# Patient Record
Sex: MALE | Race: OTHER
[De-identification: names, ages, dates, MRNs, and addresses within clinical notes are randomized per-mention and may not be internally consistent; named-entity substitution may affect disease eponyms.]

---

## 2019-09-05 ENCOUNTER — HOSPITAL ENCOUNTER (EMERGENCY)
Dept: HOSPITAL 1 - ED | Age: 24
Discharge: LEFT BEFORE DISCHARGE | End: 2019-09-05
Payer: SELF-PAY

## 2019-09-05 VITALS — DIASTOLIC BLOOD PRESSURE: 72 MMHG | SYSTOLIC BLOOD PRESSURE: 127 MMHG

## 2019-09-05 VITALS — BODY MASS INDEX: 20.58 KG/M2 | WEIGHT: 169 LBS | HEIGHT: 76 IN

## 2019-09-05 VITALS — WEIGHT: 190 LBS | BODY MASS INDEX: 23.14 KG/M2 | HEIGHT: 76 IN

## 2019-09-05 VITALS — DIASTOLIC BLOOD PRESSURE: 70 MMHG | SYSTOLIC BLOOD PRESSURE: 139 MMHG
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
## 2019-09-05 DIAGNOSIS — Z53.21: Primary | ICD-10-CM

## 2019-09-05 DIAGNOSIS — S90.821A: ICD-10-CM
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
## 2019-09-05 DIAGNOSIS — S90.822A: Primary | ICD-10-CM

## 2019-10-10 ENCOUNTER — HOSPITAL ENCOUNTER (EMERGENCY)
Facility: MEDICAL CENTER | Age: 24
End: 2019-10-17
Attending: EMERGENCY MEDICINE
Payer: COMMERCIAL

## 2019-10-10 DIAGNOSIS — F41.9 ANXIETY: ICD-10-CM

## 2019-10-10 DIAGNOSIS — F22 PARANOIA (HCC): ICD-10-CM

## 2019-10-10 PROCEDURE — 99285 EMERGENCY DEPT VISIT HI MDM: CPT

## 2019-10-11 LAB
AMPHET UR QL SCN: NEGATIVE
BARBITURATES UR QL SCN: NEGATIVE
BENZODIAZ UR QL SCN: POSITIVE
BZE UR QL SCN: NEGATIVE
CANNABINOIDS UR QL SCN: NEGATIVE
METHADONE UR QL SCN: NEGATIVE
OPIATES UR QL SCN: NEGATIVE
OXYCODONE UR QL SCN: NEGATIVE
PCP UR QL SCN: NEGATIVE
POC BREATHALIZER: 0.01 PERCENT (ref 0–0.01)
PROPOXYPH UR QL SCN: NEGATIVE

## 2019-10-11 PROCEDURE — 700102 HCHG RX REV CODE 250 W/ 637 OVERRIDE(OP): Performed by: STUDENT IN AN ORGANIZED HEALTH CARE EDUCATION/TRAINING PROGRAM

## 2019-10-11 PROCEDURE — A9270 NON-COVERED ITEM OR SERVICE: HCPCS | Performed by: PSYCHIATRY & NEUROLOGY

## 2019-10-11 PROCEDURE — 80307 DRUG TEST PRSMV CHEM ANLYZR: CPT

## 2019-10-11 PROCEDURE — 700102 HCHG RX REV CODE 250 W/ 637 OVERRIDE(OP): Performed by: PSYCHIATRY & NEUROLOGY

## 2019-10-11 PROCEDURE — 99284 EMERGENCY DEPT VISIT MOD MDM: CPT | Performed by: PSYCHIATRY & NEUROLOGY

## 2019-10-11 PROCEDURE — 90791 PSYCH DIAGNOSTIC EVALUATION: CPT

## 2019-10-11 PROCEDURE — A9270 NON-COVERED ITEM OR SERVICE: HCPCS | Performed by: STUDENT IN AN ORGANIZED HEALTH CARE EDUCATION/TRAINING PROGRAM

## 2019-10-11 PROCEDURE — 302970 POC BREATHALIZER: Performed by: EMERGENCY MEDICINE

## 2019-10-11 RX ORDER — HALOPERIDOL 5 MG/1
5 TABLET ORAL
Status: DISCONTINUED | OUTPATIENT
Start: 2019-10-11 | End: 2019-10-17 | Stop reason: HOSPADM

## 2019-10-11 RX ORDER — HALOPERIDOL 5 MG/ML
5 INJECTION INTRAMUSCULAR
Status: DISCONTINUED | OUTPATIENT
Start: 2019-10-11 | End: 2019-10-17 | Stop reason: HOSPADM

## 2019-10-11 RX ORDER — CLONAZEPAM 0.5 MG/1
0.5 TABLET ORAL 2 TIMES DAILY
Status: DISCONTINUED | OUTPATIENT
Start: 2019-10-11 | End: 2019-10-17 | Stop reason: HOSPADM

## 2019-10-11 RX ORDER — DIPHENHYDRAMINE HCL 25 MG
50 TABLET ORAL
Status: DISCONTINUED | OUTPATIENT
Start: 2019-10-11 | End: 2019-10-17 | Stop reason: HOSPADM

## 2019-10-11 RX ORDER — LORAZEPAM 2 MG/1
2 TABLET ORAL
Status: DISCONTINUED | OUTPATIENT
Start: 2019-10-11 | End: 2019-10-17 | Stop reason: HOSPADM

## 2019-10-11 RX ORDER — QUETIAPINE FUMARATE 100 MG/1
100 TABLET, FILM COATED ORAL 2 TIMES DAILY
Status: DISCONTINUED | OUTPATIENT
Start: 2019-10-11 | End: 2019-10-12

## 2019-10-11 RX ORDER — QUETIAPINE FUMARATE 25 MG/1
25 TABLET, FILM COATED ORAL 2 TIMES DAILY
COMMUNITY
End: 2019-10-11

## 2019-10-11 RX ORDER — DIPHENHYDRAMINE HYDROCHLORIDE 50 MG/ML
50 INJECTION INTRAMUSCULAR; INTRAVENOUS
Status: DISCONTINUED | OUTPATIENT
Start: 2019-10-11 | End: 2019-10-17 | Stop reason: HOSPADM

## 2019-10-11 RX ORDER — LORAZEPAM 2 MG/ML
2 INJECTION INTRAMUSCULAR
Status: DISCONTINUED | OUTPATIENT
Start: 2019-10-11 | End: 2019-10-17 | Stop reason: HOSPADM

## 2019-10-11 RX ORDER — RISPERIDONE 1 MG/1
1 TABLET ORAL 2 TIMES DAILY
Status: DISCONTINUED | OUTPATIENT
Start: 2019-10-11 | End: 2019-10-11

## 2019-10-11 RX ADMIN — QUETIAPINE FUMARATE 100 MG: 100 TABLET ORAL at 13:16

## 2019-10-11 RX ADMIN — CLONAZEPAM 0.5 MG: 0.5 TABLET ORAL at 18:24

## 2019-10-11 RX ADMIN — DIPHENHYDRAMINE HCL 50 MG: 25 TABLET ORAL at 22:55

## 2019-10-11 RX ADMIN — HALOPERIDOL 5 MG: 5 TABLET ORAL at 22:50

## 2019-10-11 RX ADMIN — RISPERIDONE 1 MG: 1 TABLET ORAL at 12:17

## 2019-10-11 RX ADMIN — CLONAZEPAM 0.5 MG: 0.5 TABLET ORAL at 12:21

## 2019-10-11 RX ADMIN — LORAZEPAM 2 MG: 2 TABLET ORAL at 22:13

## 2019-10-11 RX ADMIN — CLONAZEPAM 0.5 MG: 0.5 TABLET ORAL at 12:17

## 2019-10-11 ASSESSMENT — ENCOUNTER SYMPTOMS
EYES NEGATIVE: 1
CARDIOVASCULAR NEGATIVE: 1
CONSTITUTIONAL NEGATIVE: 1
HALLUCINATIONS: 1
SHORTNESS OF BREATH: 1
NEUROLOGICAL NEGATIVE: 1
GASTROINTESTINAL NEGATIVE: 1
MUSCULOSKELETAL NEGATIVE: 1

## 2019-10-11 NOTE — DISCHARGE PLANNING
Referral: Legal Hold     Intervention: Legal Hold Paperwork given to TRENTON Mcdaniel by Alert Team CARMELINA Maki.     Legal Hold Initiated: Date: 10- Time: 0600     Patient’s Insurance Listed on Face Sheet: [employer]     Referrals sent to: [facility] and [facility]     This referral contains the following information:  1. Face sheet _x___  2. Page 1 and Page 2 of Legal Hold _x___  3. Alert Team Assessment/Psych Assessment _x__  4. Head to toe physical exam ___x_  5. Urine Drug Screen _x___  6. Blood Alcohol _x___  7. Vital signs __x__  8. Pregnancy test when applicable _NA__  9. Medications list __x__     Plan: Patient will transfer to mental health facility once acceptance is obtained
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
## 2019-10-11 NOTE — ED PROVIDER NOTES
ED Provider Note    The patient was signed out to my care for repeat exam as the patient is awaiting transfer for anxiety and psychosis.  The patient has been stable and has not required any acute interventions.  He has been reexamined by life skills since they state that is pending transfer with acceptance to his facility for his psychosis.

## 2019-10-11 NOTE — ED TRIAGE NOTES
Pt seen at [facility] last night for anxiety and states he didn't get the medication he requested so he decided to try here.

## 2019-10-11 NOTE — ED NOTES
Care assumed, patient  Up to bathroom to provide urine sample. Nad noted. Remains in line of sight of sitter.

## 2019-10-11 NOTE — ED NOTES
Patient's home medications have been reviewed by the pharmacy team.     No past medical history on file.    Patient's Medications   New Prescriptions    No medications on file   Previous Medications    No medications on file   Modified Medications    No medications on file   Discontinued Medications    QUETIAPINE (SEROQUEL) 25 MG TAB    Take 25 mg by mouth 2 times a day.          A:  Denies taking any home medications.     P:    No recommendations at this time. Defer to psychiatry.     Barbie Guajardo, Pharm.D., BCPS

## 2019-10-11 NOTE — ED PROVIDER NOTES
"ER Provider Note     Scribed for Vik Rasheed M.D. by Daniel Galarza. 10/11/2019, 12:01 AM.    Primary Care Provider: No primary care provider noted.  Means of Arrival: EMS   History obtained from: Patient  History limited by: None     CHIEF COMPLAINT  Chief Complaint   Patient presents with   • Anxiety     Pt seen at [facility] last night for anxiety also. Requesting  RX       HPI  Donald Masters is a 24 y.o. male who presents to the Emergency Department for acute, intermittent shortness of breath and anxiety onset a couple week ago. Patient states that he was seen at [facility] earlier tonight for shortness of breath and given \"benzos\". He left [facility] to \"get his stuff from his step-mom and started to freak out\". Patient called the police who referred him to EMS. He notes that EMS, not the patient, decided to bring him here rather than [facility]. He states that \"his memory has not been working\" for the past couple weeks, but mostly complains that \"he cannot talk and breath at the same time. Denies any associated chest pain, heart palpations, or abdominal pain. There are no known alleviating or exacerbating factors. He admits to having a history of panic disorder, and alleges that he has been on Seroquel     Patient states that he trained as a  and can \"run back to [place]\", which he states his psychiatrist is located. He additionally notes that his symptoms are due to \"black magic\". When asked for a family member we could call, he gave a number for his step mom, \"Estefany\": [phone].    REVIEW OF SYSTEMS  See HPI for further details. All other systems are negative.     PAST MEDICAL HISTORY       SURGICAL HISTORY  patient denies any surgical history    SOCIAL HISTORY  Social History     Tobacco Use   • Smoking status: Not on file   Substance Use Topics   • Alcohol use: Not on file   • Drug use: Not on file      Social History     Substance and Sexual Activity   Drug Use Not on file       FAMILY " HISTORY  No family history on file.    CURRENT MEDICATIONS  Current Outpatient Medications:   •  QUEtiapine (SEROQUEL) 25 MG Tab, Take 25 mg by mouth 2 times a day., Disp: , Rfl:     ALLERGIES  No Known Allergies    PHYSICAL EXAM  VITAL SIGNS: /77   Pulse 75   Temp 36.4 °C (97.5 °F) (Temporal)   Resp 16   SpO2 99%      Constitutional: Alert in no apparent distress.  HENT: No signs of trauma, Bilateral external ears normal, Nose normal.   Eyes: Pupils are equal and reactive, Conjunctiva normal, Non-icteric.   Neck: Normal range of motion, No tenderness, Supple, No stridor.   Lymphatic: No lymphadenopathy noted.   Cardiovascular: Regular rate and rhythm, no murmurs.   Thorax & Lungs: Normal breath sounds, No respiratory distress, No wheezing, No chest tenderness.   Abdomen: Bowel sounds normal, Soft, No tenderness, No masses, No pulsatile masses. No peritoneal signs.  Skin: Warm, Dry, No erythema, No rash.   Back: No bony tenderness, No CVA tenderness.   Extremities: Intact distal pulses, No edema, No tenderness, No cyanosis.  Musculoskeletal: Good range of motion in all major joints. No tenderness to palpation or major deformities noted.   Neurologic: Alert , Normal motor function, Normal sensory function, No focal deficits noted.   Psychiatric: Affect normal, Judgment normal, Mood normal.     DIAGNOSTIC STUDIES / PROCEDURES      LABS  Labs Reviewed   URINE DRUG SCREEN   POC BREATHALIZER     All labs reviewed by me.    RADIOLOGY  No orders to display      The radiologist's interpretation of all radiological studies have been reviewed by me.    COURSE & MEDICAL DECISION MAKING  Pertinent Labs & Imaging studies reviewed. (See chart for details)    This is a 24 y.o. male that presents with severe anxiety.  At this time the patient stomach criteria for hold however wanted to be evaluated by our behavioral health team.  I will get an alcohol level as well as urine drug screen and then have the alert team see the  patient.    12:01 AM - Patient seen and examined at bedside.     Patient will be signed out to Dr. Perrin with disposition pending.  I believe that the patient gets resources from the behavioral health team that he would likely be a safe discharge.    FINAL IMPRESSION  Anxiety.  Paranoia.     IDaniel (Scribe), am scribing for, and in the presence of, Vik Rasheed M.D..    Electronically signed by: Danile Galarza (Rhondaibe), 10/11/2019    IVik M.D. personally performed the services described in this documentation, as scribed by Daniel Galarza in my presence, and it is both accurate and complete.     E.    The note accurately reflects work and decisions made by me.  Vik Rasheed  10/11/2019  2:05 AM

## 2019-10-11 NOTE — ED NOTES
Med Rec completed per patient   Allergies reviewed  No ORAL antibiotics in last 14 days    Patient states that he is not currently taking any medications at home

## 2019-10-11 NOTE — ED NOTES
"Pt lying in bed. When asked if he was ok Pt stated \" Yes, I am just rehearsing.\" No c/o pain or distress observed.  "
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
## 2019-10-11 NOTE — ED PROVIDER NOTES
ED Provider Note    Patient signed out from Dr. Rasheed pending life skills evaluation.  Please see his note for the initial evaluation and management.  Patient was evaluated by life skills and placed on legal hold due to concerns for patient's safety due to continuing anxiety and paranoia.  Patient will be signed out to the oncoming ED physician while awaiting further mental health evaluation and possible psychiatric placement.

## 2019-10-12 PROCEDURE — A9270 NON-COVERED ITEM OR SERVICE: HCPCS | Performed by: PSYCHIATRY & NEUROLOGY

## 2019-10-12 PROCEDURE — A9270 NON-COVERED ITEM OR SERVICE: HCPCS | Performed by: STUDENT IN AN ORGANIZED HEALTH CARE EDUCATION/TRAINING PROGRAM

## 2019-10-12 PROCEDURE — A9270 NON-COVERED ITEM OR SERVICE: HCPCS | Performed by: EMERGENCY MEDICINE

## 2019-10-12 PROCEDURE — 700102 HCHG RX REV CODE 250 W/ 637 OVERRIDE(OP): Performed by: STUDENT IN AN ORGANIZED HEALTH CARE EDUCATION/TRAINING PROGRAM

## 2019-10-12 PROCEDURE — 700102 HCHG RX REV CODE 250 W/ 637 OVERRIDE(OP): Performed by: PSYCHIATRY & NEUROLOGY

## 2019-10-12 PROCEDURE — 700102 HCHG RX REV CODE 250 W/ 637 OVERRIDE(OP): Performed by: EMERGENCY MEDICINE

## 2019-10-12 PROCEDURE — 99281 EMR DPT VST MAYX REQ PHY/QHP: CPT | Performed by: PSYCHIATRY & NEUROLOGY

## 2019-10-12 RX ORDER — QUETIAPINE FUMARATE 25 MG/1
50 TABLET, FILM COATED ORAL 2 TIMES DAILY
Status: DISCONTINUED | OUTPATIENT
Start: 2019-10-12 | End: 2019-10-17 | Stop reason: HOSPADM

## 2019-10-12 RX ORDER — QUETIAPINE FUMARATE 100 MG/1
100 TABLET, FILM COATED ORAL EVERY EVENING
Status: DISCONTINUED | OUTPATIENT
Start: 2019-10-12 | End: 2019-10-13

## 2019-10-12 RX ORDER — NICOTINE 21 MG/24HR
1 PATCH, TRANSDERMAL 24 HOURS TRANSDERMAL ONCE
Status: COMPLETED | OUTPATIENT
Start: 2019-10-12 | End: 2019-10-13

## 2019-10-12 RX ADMIN — QUETIAPINE FUMARATE 100 MG: 100 TABLET ORAL at 20:19

## 2019-10-12 RX ADMIN — QUETIAPINE FUMARATE 50 MG: 25 TABLET ORAL at 14:35

## 2019-10-12 RX ADMIN — NICOTINE TRANSDERMAL SYSTEM 1 PATCH: 21 PATCH, EXTENDED RELEASE TRANSDERMAL at 20:19

## 2019-10-12 RX ADMIN — CLONAZEPAM 0.5 MG: 0.5 TABLET ORAL at 06:27

## 2019-10-12 RX ADMIN — CLONAZEPAM 0.5 MG: 0.5 TABLET ORAL at 19:07

## 2019-10-12 RX ADMIN — QUETIAPINE FUMARATE 100 MG: 100 TABLET ORAL at 06:27

## 2019-10-12 NOTE — PSYCHIATRY
"PSYCHIATRIC FOLLOW-UP:(established)  *Reason for admission:  seen at [facility] last night for anxiety also: acute, intermittent shortness of breath and anxiety onset a couple week ago. Complains that \"he cannot talk and breath at the same time                      *Legal Hold Status on Admission: +                     *HPI:  Sleeping more because of the \"black magic\". Feels \"uncomfortable because he is in hospital gown. Not SI. Today says he is supposed to get  soon but only if he can get himself together. Asked him about halluciations and told him I knew he had them yesterday. He thought about this and said, \"but not today\".  MSE nearly the same.         *Psychiatric Examination:  Vitals: Blood pressure 102/54, pulse 65, temperature 36.5 °C (97.7 °F), temperature source Oral, resp. rate 16, height 1.93 m (6' 4\"), weight 81.6 kg (180 lb), SpO2 94 %.  General Appearance: intermittent to good eye contact.   Abnormal Movements: none  Gait and Posture: lying down  Speech:wnl  Thought Process: normal rate   Associations:linear  Abnormal or Psychotic Thoughts:delusions, hallucinations  Judgement and Insight:impaired  Orientation:grossly   Recent and Remote Memory: grossly intact  Attention Span and Concentration:intact  Language:fluid  Fund of Knowledge:not tested  Mood and Affect: not identified but looks euthymic  SI/HI: denies     *ASSESSMENT/PLAN:  1. Psychotic disorder unspc  -R/O schizophrenia: very likely  -lower daytime seroquel to 50 mg bid     2. Medical:  -Hx of TBI per mom with residual memory impairment at age 19.            *Legal hold: extended                *Will Follow      "

## 2019-10-12 NOTE — ED NOTES
Pt up ambulatory with steady gait to the bathroom. Pt ambulated back to room, requesting ritalin and breakfast. Denies SI/HI. Monitoring continues.

## 2019-10-12 NOTE — DISCHARGE PLANNING
Alert Team    Patient lying in bed sleeping (Patient appears to be breathing normally with no distress, with sitter in clear view of patient) at time of rounding; he is currently on a legal hold at this time while awaiting transfer to an inpatient psychiatric facility. The patient was reported as struggling with anxiety alongside responding to internal stimuli; he was noted by psychiatry as having delusions/paranoid thoughts. Alert Team to continue to monitor; nothing further to note at this time.

## 2019-10-12 NOTE — ED NOTES
"Pts family at bedside. Ok per pt to give information regarding medical information. Pt in family consultation room. Per mother of pt, family concerned for pt safety as \"it is the worst we have ever seen him.\" Mother of pt reports 6-7 ER visits in September stating \"he is in danger and not in control.\" Per sister pt has dx of schizophrenia and bipolar. Family does not feel he is safe to discharge and needs inpatient treatment. Family brought an advance health care directive form from the [facility]. Copies obtained and put in pt chart. Pt had many questions about Legal 2000 hold. Social work contacted and to discuss with family.     Contact #  Mother Italia [phone]  Sister Zack [phone]  "

## 2019-10-12 NOTE — ED NOTES
"Pt increasingly paranoid, states he wants to leave \"I'm being held here illegally\". Pt redirected, emotional support provided. Pt agrees to take PO medications.  Snack given.   "

## 2019-10-12 NOTE — ED NOTES
Assumed care of pt at this time, pt appears to be sleeping. Respirations even and non-labored. NAD at this time. 1:1 sitter at bedside and in view of pt.

## 2019-10-12 NOTE — DISCHARGE PLANNING
MSW spoke to Violeta at [facility]. They tried to contract with pt's [facility] through [place], [place]. They declined and would not work with them. Pt now awaiting bed at [facility].

## 2019-10-12 NOTE — ED PROVIDER NOTES
ED Provider Note Addendum     This is an addendum to the note on Donald Aaronessie.  For further details and full chart information, see patient's initial note.          8:27 AM  - Patient evaluated by myself. Patient is calm and cooperative with no complaints. Patient is awaiting transfer to psychiatric facility for further psych evaluation.    Disposition:  Patient will be transferred to an appropriate psychiatric facility in stable condition for further psychiatric care and evaluation.  Patient will be placed under the care of my partner awaiting transfer.    FINAL IMPRESSION  1. Anxiety Active   2. Paranoia (HCC) Active           The note accurately reflects work and decisions made by me.  Jamin Catherine  10/12/2019  12:30 PM

## 2019-10-12 NOTE — ED NOTES
"Pt awake and pacing in room, out to nurses station, states \"I'm going to leave soon\". Discussed limitations d/t legal hold. Pt voices understanding, still appears anxious. Pt agrees to take Ativan PO per PRN orders.   "

## 2019-10-12 NOTE — ED NOTES
"Pt out of room, states \"I want to leave\". Pt advised and aware of legal hold. Pt redirected, meds given, dinner given. Pt pacing in room post meds,   "

## 2019-10-12 NOTE — ED NOTES
"Pt states \"there are free agents everywhere, they are with the FBI\". Pt paranoid. Redirected.   "

## 2019-10-13 PROCEDURE — 700111 HCHG RX REV CODE 636 W/ 250 OVERRIDE (IP): Performed by: PSYCHIATRY & NEUROLOGY

## 2019-10-13 PROCEDURE — 99281 EMR DPT VST MAYX REQ PHY/QHP: CPT | Performed by: PSYCHIATRY & NEUROLOGY

## 2019-10-13 PROCEDURE — 700102 HCHG RX REV CODE 250 W/ 637 OVERRIDE(OP): Performed by: PSYCHIATRY & NEUROLOGY

## 2019-10-13 PROCEDURE — 96372 THER/PROPH/DIAG INJ SC/IM: CPT

## 2019-10-13 PROCEDURE — A9270 NON-COVERED ITEM OR SERVICE: HCPCS | Performed by: PSYCHIATRY & NEUROLOGY

## 2019-10-13 RX ORDER — QUETIAPINE FUMARATE 100 MG/1
200 TABLET, FILM COATED ORAL EVERY EVENING
Status: DISCONTINUED | OUTPATIENT
Start: 2019-10-13 | End: 2019-10-14

## 2019-10-13 RX ADMIN — DIPHENHYDRAMINE HYDROCHLORIDE 50 MG: 50 INJECTION INTRAMUSCULAR; INTRAVENOUS at 02:32

## 2019-10-13 RX ADMIN — QUETIAPINE FUMARATE 50 MG: 25 TABLET ORAL at 15:21

## 2019-10-13 RX ADMIN — LORAZEPAM 2 MG: 2 INJECTION INTRAMUSCULAR; INTRAVENOUS at 02:32

## 2019-10-13 RX ADMIN — LORAZEPAM 2 MG: 2 TABLET ORAL at 15:48

## 2019-10-13 RX ADMIN — HALOPERIDOL LACTATE 5 MG: 5 INJECTION, SOLUTION INTRAMUSCULAR at 02:32

## 2019-10-13 RX ADMIN — QUETIAPINE FUMARATE 200 MG: 100 TABLET ORAL at 20:46

## 2019-10-13 RX ADMIN — DIPHENHYDRAMINE HCL 50 MG: 25 TABLET ORAL at 16:49

## 2019-10-13 RX ADMIN — HALOPERIDOL 5 MG: 5 TABLET ORAL at 16:49

## 2019-10-13 ASSESSMENT — PAIN SCALES - WONG BAKER: WONGBAKER_NUMERICALRESPONSE: DOESN'T HURT AT ALL

## 2019-10-13 NOTE — ED NOTES
Assumed care of pt. Pt AAOx4. No signs of distress. Side rails up. Pt. Ambulated to bathroom with steady gait.

## 2019-10-13 NOTE — ED NOTES
Report received from Alexandr CLEANING, pt care assumed, pt resting with rise and fall of chest observed.

## 2019-10-13 NOTE — PSYCHIATRY
"PSYCHIATRIC FOLLOW-UP:(established)  *Reason for admission:   seen at [facility] last night for anxiety also: acute, intermittent shortness of breath and anxiety onset a couple week ago. Complains that \"he cannot talk and breath at the same time                           *Legal Hold Status on Admission:+                        *HPI:  \"that donald (the sitter) is looking at my balls so I freaked out\" and made a mess of the room.  Black magic continues to be an issue.         *Psychiatric Examination:  Vitals: Blood pressure 113/62, pulse 96, temperature 36.4 °C (97.5 °F), temperature source Tympanic, resp. rate 14, height 1.93 m (6' 4\"), weight 81.6 kg (180 lb), SpO2 100 %.  General Appearance: intermittent to good eye contact.   Abnormal Movements: none  Gait and Posture: pacing  Speech:wnl  Thought Process: normal rate   Associations:linear  Abnormal or Psychotic Thoughts:delusions, hallucinations  Judgement and Insight:impaired  Orientation:grossly   Recent and Remote Memory: grossly intact  Attention Span and Concentration:intact  Language:fluid  Fund of Knowledge: looks irritated but not threatening.  SI/HI: denies      *ASSESSMENT/PLAN:  1. Psychotic disorder unspc  -R/O schizophrenia: very likely  -continue seroquel 50/50 and increase hs to 100 mg     2. Medical:  -Hx of TBI per mom with residual memory impairment at age 19.               *Legal hold: extended              *Will Follow      "

## 2019-10-13 NOTE — DISCHARGE PLANNING
MSW received call from bedside RN. Pt's family had lots of questions regarding legal hold process. MSW explained process and what to expect as family. MSW educated pt's family on POA vs guardian/conservator. Family thankful for conversation. MSW will meet with family again in am to provide resources.

## 2019-10-13 NOTE — ED NOTES
Vital signs rechecked, patient is resting in the gurney and is easy to awaken at this time.   All family members left

## 2019-10-13 NOTE — ED NOTES
Pt resting comfortably. Diet order changed to vegetarian per family request. Pt requesting toothbrush toothpaste. Items provided to pt.

## 2019-10-13 NOTE — ED NOTES
Pt attempting to leave ED, threatening staff. Refused PRN haldol given IM PRNs with security at bedside.

## 2019-10-13 NOTE — ED NOTES
Assessment unchanged. Pt resting in bed comfortably. No signs of distress. VSS. Side rails up.

## 2019-10-13 NOTE — ED NOTES
Pt still agitated he did have some decrease with medication however continues to pace and use bathroom frequently and obsessively washing hands. Pt medicated and aware of staying calm cooperative.

## 2019-10-13 NOTE — ED NOTES
Pt has had family at bedside per  approval, however after family visit pt is becoming more agitated and pacing room, he is escalating verbally, pt informed of remaining calm and cooperative he has been medicated for agitation.

## 2019-10-13 NOTE — ED NOTES
Assessment unchanged. Pt resting in bed comfortably. No signs of distress. Side rails up. Sitter monitoring pt.

## 2019-10-13 NOTE — ED PROVIDER NOTES
ED PROVIDER NOTE    Scribed for Jamin Catherine D.O. by Sharon Lama. 10/13/2019, 9:25 AM.    This is an addendum to the note on Donald Masters. For further details and full chart entry, see the previously signed ED Provider Note written by previous ERP.      7:30 AM - I discussed the patient's case with previous ERP who will transfer care of the patient to me at this time.        9:16 AM - The patient's continuing management included reevaluation. The patient is sleeping comfortably however is easily arousable and calm. She remains on Legal hold and is awiting psychiatric evaluation.     FINAL IMPRESSION      I, Sharon Lama (Scribe), am scribing for, and in the presence of, Jamin Catherine D.O..    Electronically signed by: Sharon Lama (Scribe), 10/13/2019    IJamin D.O. personally performed the services described in this documentation, as scribed by Sharon Lama in my presence, and it is both accurate and complete.    The note accurately reflects work and decisions made by me.  Jamin Catherine  10/13/2019  1:03 PM

## 2019-10-13 NOTE — ED NOTES
Assumed care of pt. Pt. Pacing around room. Follows commands.. Pt AAOx4. VSS. No signs of distress. Side rails up.

## 2019-10-14 PROCEDURE — A9270 NON-COVERED ITEM OR SERVICE: HCPCS | Performed by: EMERGENCY MEDICINE

## 2019-10-14 PROCEDURE — 700102 HCHG RX REV CODE 250 W/ 637 OVERRIDE(OP): Performed by: PSYCHIATRY & NEUROLOGY

## 2019-10-14 PROCEDURE — A9270 NON-COVERED ITEM OR SERVICE: HCPCS | Performed by: PSYCHIATRY & NEUROLOGY

## 2019-10-14 PROCEDURE — 99281 EMR DPT VST MAYX REQ PHY/QHP: CPT | Performed by: PSYCHIATRY & NEUROLOGY

## 2019-10-14 PROCEDURE — 700102 HCHG RX REV CODE 250 W/ 637 OVERRIDE(OP): Performed by: EMERGENCY MEDICINE

## 2019-10-14 RX ORDER — QUETIAPINE FUMARATE 100 MG/1
300 TABLET, FILM COATED ORAL EVERY EVENING
Status: DISCONTINUED | OUTPATIENT
Start: 2019-10-14 | End: 2019-10-17 | Stop reason: HOSPADM

## 2019-10-14 RX ORDER — NICOTINE 21 MG/24HR
1 PATCH, TRANSDERMAL 24 HOURS TRANSDERMAL ONCE
Status: COMPLETED | OUTPATIENT
Start: 2019-10-14 | End: 2019-10-15

## 2019-10-14 RX ADMIN — LORAZEPAM 2 MG: 2 TABLET ORAL at 11:56

## 2019-10-14 RX ADMIN — QUETIAPINE FUMARATE 300 MG: 100 TABLET ORAL at 20:03

## 2019-10-14 RX ADMIN — CLONAZEPAM 0.5 MG: 0.5 TABLET ORAL at 06:19

## 2019-10-14 RX ADMIN — QUETIAPINE FUMARATE 50 MG: 25 TABLET ORAL at 14:53

## 2019-10-14 RX ADMIN — NICOTINE TRANSDERMAL SYSTEM 1 PATCH: 21 PATCH, EXTENDED RELEASE TRANSDERMAL at 13:09

## 2019-10-14 RX ADMIN — QUETIAPINE FUMARATE 50 MG: 25 TABLET ORAL at 06:20

## 2019-10-14 RX ADMIN — CLONAZEPAM 0.5 MG: 0.5 TABLET ORAL at 17:24

## 2019-10-14 NOTE — ED NOTES
Sitter removed from doorway, RN continued close observations. Pt resting calmly in bed, no s/s of distress noted.

## 2019-10-14 NOTE — ED NOTES
Pt sleeping comfortably in bed, breathing is easy and unlabored. Bed in lowest position, blankets provided for comfort. No s/s of distress noted. No new needs identified,Will continue to monitor.

## 2019-10-14 NOTE — ED NOTES
Pt sleeping comfortably in bed, breathing is easy and unlabored. Bed in lowest position, blankets provided for comfort. No s/s of distress noted. No new needs identified, Will continue to monitor.

## 2019-10-14 NOTE — ED NOTES
Pt pacing in room. States he is extremely agitated and anxious. Vital signs obtained. HR elevated. Attempted to redirect pt to calm self. Pt continues to pace and state he is extremely anxious.     Medicated per MAR

## 2019-10-14 NOTE — ED NOTES
Sitter 1:1 at bedside, safety maintained, constant obs on patient continued. Laying down in bed. Continuing to monitor.
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
## 2019-10-14 NOTE — ED NOTES
Pt requesting to be discharged. Updated on wait for MD evaluation. Pt agitated with plan. Cooperative at this time.

## 2019-10-14 NOTE — ED NOTES
Report received from HERMILA Carrero    Pt sleeping soundly at this time. Will continue to monitor.

## 2019-10-14 NOTE — ED NOTES
Pt sleeping comfortably in bed, breathing is easy and unlabored. Bed in lowest position, blankets provided for comfort. No s/s of distress noted. No new needs identified. Will continue to monitor.

## 2019-10-14 NOTE — ED NOTES
Pt medicated with morning medications, asking RN if he can leave and go home.  Education provided, RN informed pt that  Will be rounding this morning to discussed POC with pt.  Pt verbalized understanding, calm and cooperative.

## 2019-10-14 NOTE — ED PROVIDER NOTES
"ED Provider Note    I have checked on the patient.  No issues are reported by nursing staff.  When asked him how he is doing he looks me in the pulls the covers over his head./70   Pulse 92   Temp 36.9 °C (98.5 °F) (Temporal)   Resp 14   Ht 1.93 m (6' 4\")   Wt 81.6 kg (180 lb)   SpO2 98%   BMI 21.91 kg/m²   We await psychiatric transfer.  "

## 2019-10-14 NOTE — ED NOTES
Assumed care, report received from RN, POC discussed.   Introduced self as RN, call light within reach, no s/s of distress noted.   Q15 observation in place.

## 2019-10-14 NOTE — DISCHARGE PLANNING
Alert Team  Noted pt to still be pacing in his room and smiling, floridly responding to internal stimuli.  PO PRN Ativan was given about an hour ago.  WCTM.

## 2019-10-14 NOTE — ED NOTES
Continued sitter at doorway.  No s/s of distress noted. Pt eating snack of crackers and juice in bed.

## 2019-10-14 NOTE — ED NOTES
Pt redirected into room. Pt repeatedly asking if he can go for a walk. Instructed that once a  is able- they will walk with him when available.

## 2019-10-14 NOTE — DISCHARGE PLANNING
"Alert Team  Pt continues to try and calm himself; trying to walk, stretch, read, watch tv.  It is short lived, and he frequently paces and approached the nurses station asking if he can be discharged, what the plan for his care is, and with unusual physical complaints.  At one point, asked for the medical doctor to \"check my liver, I think it's bleeding.\"  Minimal insight.  While easily redirected, he is notably anxious despite 2mg of Ativan given around noon.  "

## 2019-10-14 NOTE — DISCHARGE PLANNING
"ALERT Team Rounds Note:     Data: Patient has been in the ER 65 hours. Patient is on a legal hold prompted by his anxiety and psychosis involving grandiose and paranoid delusions and hallucinations, and is awaiting transfer to a psychiatric hospital.     Patient was observed seated on his bed, reading, in no apparent distress, but he spoke of dealing with violent and evil people through his reading, and of wanting to avoid them. He also said that he is much improved since his arrival, \"360 degrees.\"    MENTAL STATUS   Participation: Active verbal participation  Grooming: Adequate  Orientation: not assessed.  Behavior: Calm  Eye contact: Limited  Mood: Euthymic, by appearance.  Affect: Blunted  Thought process: Perseveration, mildly disorganized.  Thought content: Preoccupation and Paranoia  Speech: Rate within normal limits, Volume within normal limits and mildy disorganized.  Perception: Not assessed.  Memory:  No gross evidence of memory deficits  Insight: Poor  Judgment:  Poor    I have reviewed the note in the patient's chart for this admission.    Assessment: Pt's capacity for self-care continues to appear undermined by his mental illness.    Plan: Per chart.    Freddie Iverson, PhD, Alert Team Psychologist   "

## 2019-10-15 PROCEDURE — 96372 THER/PROPH/DIAG INJ SC/IM: CPT

## 2019-10-15 PROCEDURE — 99283 EMERGENCY DEPT VISIT LOW MDM: CPT | Performed by: PSYCHIATRY & NEUROLOGY

## 2019-10-15 PROCEDURE — 700102 HCHG RX REV CODE 250 W/ 637 OVERRIDE(OP): Performed by: PSYCHIATRY & NEUROLOGY

## 2019-10-15 PROCEDURE — A9270 NON-COVERED ITEM OR SERVICE: HCPCS | Performed by: PSYCHIATRY & NEUROLOGY

## 2019-10-15 PROCEDURE — 700111 HCHG RX REV CODE 636 W/ 250 OVERRIDE (IP): Performed by: PSYCHIATRY & NEUROLOGY

## 2019-10-15 RX ADMIN — LORAZEPAM 2 MG: 2 INJECTION INTRAMUSCULAR; INTRAVENOUS at 12:24

## 2019-10-15 RX ADMIN — HALOPERIDOL LACTATE 5 MG: 5 INJECTION, SOLUTION INTRAMUSCULAR at 14:21

## 2019-10-15 RX ADMIN — CLONAZEPAM 0.5 MG: 0.5 TABLET ORAL at 18:48

## 2019-10-15 RX ADMIN — QUETIAPINE FUMARATE 300 MG: 100 TABLET ORAL at 18:49

## 2019-10-15 NOTE — ED PROVIDER NOTES
ED Provider Note    6:16 AM Patient reevaluated.  Patient is on a legal hold, waiting transfer to psychiatric facility when a bed is available.  Please refer to initial note for complete details.  Patient was somewhat anxious overnight but redirectable without additional medication intervention.  Otherwise no concerns.  Patient ambulates to the bathroom independently and tolerated a meal.  Medication reconciliation has been reviewed.

## 2019-10-15 NOTE — ED NOTES
"Pt visibly agitated but states he is trying to \"control himself\". Reading and pacing the room. Still refusing to take medication PRN or oral. Sitter in direct obs of pt  "

## 2019-10-15 NOTE — ED NOTES
Assessment unchanged. Pt resting in bed comfortably. No signs of distress. VSS. Side rails up. Call light within reach.

## 2019-10-15 NOTE — ED NOTES
Pt and I walked around the unit for about 15 mins. And changed the sheets on his bed. Pt is cooperative and resting in his bed. Sitter in close obs

## 2019-10-15 NOTE — ED NOTES
Legal at bedside. Pt alert and pacing in room. Cooperative. Refused to brush teeth. Sitter in direct obs of pt

## 2019-10-15 NOTE — ED PROVIDER NOTES
ED Provider Note    Patient continues to await transfer to an inpatient psychiatric facility.  Patient has been reasonably cooperative with nursing staff eating and drinking with no difficulty.  No further complaints at this time.  October 14, 2019 10:57 PM

## 2019-10-15 NOTE — ED NOTES
Assumed care of pt. Sitter monitoring pt. Pt pacing around room and intermittently sitting to watch television. No signs of distress.

## 2019-10-15 NOTE — ED NOTES
Assessment unchanged. Pt resting in bed comfortably. No signs of distress. . Side rails up. Sitter monitoring pt.

## 2019-10-15 NOTE — ED NOTES
Pt agreed to ativan because he was unable to control his thoughts and anxiety. Pt is continuing his food and water fast but allowed for injection of medication.

## 2019-10-15 NOTE — ED NOTES
"After pt asked for IM medication he refused it saying he was trying to \"control his anxiety\".   "

## 2019-10-15 NOTE — ED NOTES
"Pt has refused morning meds stating he will be fasting today. [place] he will take his medication. Pt is alert and pacing. coorperative otherwise. Pt states if necessary he can take injections and states \"1 day of not taking meds will not make a big deal\"  "
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
## 2019-10-15 NOTE — DISCHARGE PLANNING
Legal Hold    Referral: Legal Hold Court     Intervention: Pt presented for legal hold meeting with .  advised pt will meet with court MD's via telemedicine monitor to contest the legal hold.      Plan: Pt will present to telemedicine mental health to meet with court physicians 10/16. Will call bedside RN once time has been determined

## 2019-10-16 PROCEDURE — 99283 EMERGENCY DEPT VISIT LOW MDM: CPT | Mod: GC | Performed by: PSYCHIATRY & NEUROLOGY

## 2019-10-16 PROCEDURE — 700102 HCHG RX REV CODE 250 W/ 637 OVERRIDE(OP): Performed by: PSYCHIATRY & NEUROLOGY

## 2019-10-16 PROCEDURE — A9270 NON-COVERED ITEM OR SERVICE: HCPCS | Performed by: PSYCHIATRY & NEUROLOGY

## 2019-10-16 RX ADMIN — HALOPERIDOL 5 MG: 5 TABLET ORAL at 13:55

## 2019-10-16 RX ADMIN — QUETIAPINE FUMARATE 300 MG: 100 TABLET ORAL at 21:00

## 2019-10-16 RX ADMIN — CLONAZEPAM 0.5 MG: 0.5 TABLET ORAL at 18:30

## 2019-10-16 RX ADMIN — CLONAZEPAM 0.5 MG: 0.5 TABLET ORAL at 07:22

## 2019-10-16 RX ADMIN — LORAZEPAM 2 MG: 2 TABLET ORAL at 13:54

## 2019-10-16 RX ADMIN — LORAZEPAM 2 MG: 2 TABLET ORAL at 07:23

## 2019-10-16 RX ADMIN — QUETIAPINE FUMARATE 50 MG: 25 TABLET ORAL at 07:23

## 2019-10-16 RX ADMIN — QUETIAPINE FUMARATE 50 MG: 25 TABLET ORAL at 13:55

## 2019-10-16 ASSESSMENT — ENCOUNTER SYMPTOMS
EYE DISCHARGE: 0
TINGLING: 0
DIZZINESS: 0
NAUSEA: 0
COUGH: 0
NERVOUS/ANXIOUS: 1
NEUROLOGICAL NEGATIVE: 1
MUSCULOSKELETAL NEGATIVE: 1
PALPITATIONS: 0
EYES NEGATIVE: 1
HEADACHES: 0
FOCAL WEAKNESS: 0
VOMITING: 0
SENSORY CHANGE: 0
INSOMNIA: 1
WEAKNESS: 0
DEPRESSION: 1
FEVER: 0
RESPIRATORY NEGATIVE: 1
CARDIOVASCULAR NEGATIVE: 1
SHORTNESS OF BREATH: 0
BLURRED VISION: 0
TREMORS: 0
WEIGHT LOSS: 0
CONSTITUTIONAL NEGATIVE: 1
HEARTBURN: 0
MYALGIAS: 0
CHILLS: 0
HALLUCINATIONS: 1
GASTROINTESTINAL NEGATIVE: 1

## 2019-10-16 NOTE — DISCHARGE PLANNING
Spoke to  Gurdeep regarding patient's meeting with the court doctors today. Patient has been scheduled to meet with court doctors via telemedicine in the [address] room at 1230. Let RN know of upcoming meeting this afternoon.

## 2019-10-16 NOTE — ED NOTES
Pt sitting at edge of bed, no needs at this time, bed in lowered position side rails up, sitter infront of room

## 2019-10-16 NOTE — ED NOTES
Received report from HERMILA Mims. Taking over pt care. Pt resting comfortably, no needs at this time, bed in lowered position, side rails up,pt maintaining patent airway. Sitter in front of room

## 2019-10-16 NOTE — ED PROVIDER NOTES
ED PROVIDER NOTE    Scribed for Chandni Lama M.D. by Henrry Sheehan. 10/16/2019, 6:00 AM.    This is an addendum to the note on Donald Masters. For further details and full chart entry, see the previously signed ED Provider Note written by Dr. Rasheed.(ERP).      6:00 AM - I discussed the patient's case with Dr. Verduzco (ERP) who will transfer care of the patient to me at this time.        12:13 PM - The patient's continuing management included reevaluating patient. He is scheduled to go to court this afternoon. Per nursing staff he has had no issues overnight or this morning. He is walking around normally and has no medical complaints currently.    FINAL IMPRESSION   1. Anxiety Active    2. Paranoia (HCC) Active           I, Henrry Sheehan (Scribe), am scribing for, and in the presence of, Chandni Lama M.D..    Electronically signed by: Henrry Sheehan (Scribe), 10/16/2019    IChandni M.D. personally performed the services described in this documentation, as scribed by Henrry Sheehan in my presence, and it is both accurate and complete.    The note accurately reflects work and decisions made by me.  Chandni Lama  10/16/2019  1:38 PM

## 2019-10-16 NOTE — ED NOTES
Pt in room, RN rounding, pt appears to be sleeping. 1:1 sitter remains in line of sight of pt. Pt in NAD.

## 2019-10-16 NOTE — PSYCHIATRY
"PSYCHIATRIC FOLLOW UP:    Reason for admission: Anxiety/psychosis                    Reason for consult: Anxiety/psychosis        Requesting Physician/APN: Dr. Rasheed        Supervising Psychiatrist: Dr. Beatty     HPI:  Mr. Masters is a 25 yo M with a history of panic disorder who is currently on a legal hold for delusional psychosis. Pt states his mood today as \"good\" and rates his mood at 7 out of 10 with 10 being the best.  He states he slept well, his appetite is good and he is using the bathroom regularly.  He denies any SI or HI.  He denies any AVH or paranoia, but later states \"I am afraid of you guys and everyone has betrayed me\".  He denies feeling depressed, but does report anxiety related to his court appearance later today.  He states his plan if he were to be discharged as \"I would return to [place], [place] to be with my parents and then go to the Social Security office to get my ID and find out about SSI\".  He reports he sees God and the devil in everybody and feels that this is due to \"karma\".  He reports his parents are his original family which is changed from previous days when he felt he was adopted.  He has been seeing eating with his back to the door of his room and he states this is to \"be more focused and not distracted\".  He denies any adverse side effects from his medication use and denies any physical ailments.  He appears much more organized and logical today as evidenced by his organized speech and plan for discharge.  She reports anxiety overnight and required 2 mg of Ativan this morning for excessive anxiety.  He is agreeable to continue on his medications and to attend his tele-psych court meeting today at 1230.       Psychiatric Examination: observed phenomenon:  Vitals: /62   Pulse 89   Temp 36.4 °C (97.5 °F) (Temporal)   Resp 16   Ht 1.93 m (6' 4\")   Wt 81.6 kg (180 lb)   SpO2 100%   BMI 21.91 kg/m²  Body mass index is 21.91 kg/m².  Review of " "Systems:  Psychiatric:  Depression: Negative. Denies depressed mood, sleep disturbances, anhedonia, feelings of guilt, low energy, poor concentration, poor appetite, psychomotor disturbances and suicidality.  Anxiety: Positive.  Reports excessive anxiety more days than not and associated symptoms such as difficulty concentrating, sleep disturbance and irritability.  Psychosis: Positive for symptoms. Denies AVH, denies disorganized thinking or speech.  Occasional delusional thinking, much improved from yesterday.  Ly/Bipolar: Negative. Denies periods of decreased need for sleep, distractability, impulsivity, dangerous activities, flight of ideas.  PTSD: Negative. Denies past trauma or associated symptoms such as nightmares, flashbacks, hypervigilance, avoidance of situations.   Review of Systems   Constitutional: Negative.    HENT: Negative.    Eyes: Negative.    Respiratory: Negative.    Cardiovascular: Negative.    Gastrointestinal: Negative.    Genitourinary: Negative.    Musculoskeletal: Negative.    Skin: Negative.    Neurological: Negative.    Endo/Heme/Allergies: Negative.    Psychiatric/Behavioral: The patient is nervous/anxious.         Psychiatric Examination: observed phenomenon:  Vitals: /62   Pulse 89   Temp 36.4 °C (97.5 °F) (Temporal)   Resp 16   Ht 1.93 m (6' 4\")   Wt 81.6 kg (180 lb)   SpO2 100%   BMI 21.91 kg/m²  Body mass index is 21.91 kg/m².  General Appearance: 24 y.o.male  laying in bed in NAD. Moderately kempt and clean.  Behavior: Cooperative and engaged with interview.  Appropriate eye contact.  No aggressive behaviors.  Abnormal Movements: No abnormal movements, muscle spasms or choreiform movements.  Gait and Posture: Normal gait and posture.  Speech: Normal volume. Regular rate and rhythm.  Thought Process: Appears linear, some delusional thinking.  Associations: None  Abnormal or Psychotic Thoughts: Denies AVH, disorganized thinking , occasional delusional " "thinking.  Judgement and Insight: Poor/Fair  Orientation: Alert and oriented x4  Recent and Remote Memory: Intact  Attention Span and Concentration: Intact  Language: English  Fund of Knowledge: Appropriate  Mood:\"Good\" as stated by patient  Affect: Euthymic, full-range, anxious, congruent with stated mood.  SI/HI: Denies SI and HI.  MMSE score and/or clock drawing: Declines    Soc history:  Pt reports he would like to return to the [facility] once discharged to live with his parents and eventually return to [facility] to visit relatives.    Lab results/tests:   No results found for this or any previous visit (from the past 48 hour(s)).  No orders to display            Assessment:  1. Psychotic disorder unspc  -R/O schizophrenia: very likely  -Continue Seroquel 50 mg PO BID and 300 mg PO QHS for psychosis.  -Continue Klonopin 0.5 mg BID          Plan:  legal hold: Extended, pending court evaluation today.  "

## 2019-10-16 NOTE — ED NOTES
Pt pacing back and fourth, pt  States he feels paranoid and worse than he did when he got here. Pt requesting medications to calm him down cause he's not sure what he's  Going to do.

## 2019-10-16 NOTE — ED NOTES
1:1 sitter in doorway, direct view of pt. Pt sleeping at this time. Respirations even and non-labored.

## 2019-10-16 NOTE — ED NOTES
Report from Adilson CLEANING, assumed care of this patient, Pt sleeping in bed at this time, NAD, equal chest rise and fall, line of sight of sitter

## 2019-10-16 NOTE — ED NOTES
Family visting but patient still asleep. Social work called for update. Sitter in direct obs of pt

## 2019-10-16 NOTE — ED NOTES
Pt pacing back and fourth in room, pt states he needs nothing at this time, sitter infront of room

## 2019-10-16 NOTE — ED NOTES
Pt resting comfortably, given warm blankets no other needs at this time, bed in lowered position side rails up,

## 2019-10-17 VITALS
TEMPERATURE: 97.7 F | HEART RATE: 104 BPM | HEIGHT: 76 IN | WEIGHT: 180 LBS | RESPIRATION RATE: 16 BRPM | OXYGEN SATURATION: 100 % | DIASTOLIC BLOOD PRESSURE: 81 MMHG | BODY MASS INDEX: 21.92 KG/M2 | SYSTOLIC BLOOD PRESSURE: 115 MMHG

## 2019-10-17 PROCEDURE — 99283 EMERGENCY DEPT VISIT LOW MDM: CPT | Mod: GC | Performed by: PSYCHIATRY & NEUROLOGY

## 2019-10-17 PROCEDURE — 700102 HCHG RX REV CODE 250 W/ 637 OVERRIDE(OP): Performed by: PSYCHIATRY & NEUROLOGY

## 2019-10-17 PROCEDURE — A9270 NON-COVERED ITEM OR SERVICE: HCPCS | Performed by: PSYCHIATRY & NEUROLOGY

## 2019-10-17 RX ORDER — QUETIAPINE FUMARATE 300 MG/1
300 TABLET, FILM COATED ORAL EVERY EVENING
Qty: 15 TAB | Refills: 0 | Status: SHIPPED | OUTPATIENT
Start: 2019-10-17 | End: 2021-10-19

## 2019-10-17 RX ORDER — QUETIAPINE FUMARATE 50 MG/1
50 TABLET, FILM COATED ORAL 2 TIMES DAILY
Qty: 30 TAB | Refills: 0 | Status: SHIPPED | OUTPATIENT
Start: 2019-10-17 | End: 2021-10-19

## 2019-10-17 RX ADMIN — QUETIAPINE FUMARATE 50 MG: 25 TABLET ORAL at 06:00

## 2019-10-17 RX ADMIN — CLONAZEPAM 0.5 MG: 0.5 TABLET ORAL at 06:00

## 2019-10-17 ASSESSMENT — ENCOUNTER SYMPTOMS
NEUROLOGICAL NEGATIVE: 1
GASTROINTESTINAL NEGATIVE: 1
RESPIRATORY NEGATIVE: 1
MUSCULOSKELETAL NEGATIVE: 1
EYES NEGATIVE: 1
CARDIOVASCULAR NEGATIVE: 1
CONSTITUTIONAL NEGATIVE: 1
PSYCHIATRIC NEGATIVE: 1

## 2019-10-17 NOTE — ED NOTES
Pt sitting on [employer] drinking water. Does not appear distressed. Sitter outside doorway, will continue to monitor.

## 2019-10-17 NOTE — DISCHARGE PLANNING
MSW met with pt's sister and mother and updated them on tele health decision. MSW will meet with pt's family tomorrow in ER regarding plan if pt's is discharged. SW will continue to follow.

## 2019-10-17 NOTE — DISCHARGE PLANNING
ALERT team  note:  24 year old male admitted 10/10/19,legal hold, inability to care for self; [employer] insurance plan; legal hold DC'd by court paperwork in the chart today; per [facility] SW request, pt evaluated by [facility] psychiatry team today; pt to DC to self, with written RX for Klonopin and filled RX for Seroquel, to [facility] clinic, with transport by taxi; writer RN faxed pt referral to [facility] clinic, fax # [phone],and notified [facility] RN, Marion GLASS, of pt referral today; writer RN reviewed DC instructions to [facility], with written information given; pt verbalized understanding; no SI, HI, or self-harm ideation noted; pt able to follow directions given and writer RN witnessed pt DC in taxi, with taxi voucher given to , to [facility]

## 2019-10-17 NOTE — ED NOTES
Pt appears to be resting quietly. Even, easy respirations noted. Does not appear to be distressed, will continue to monitor. Sitter in doorway providing direct observation.

## 2019-10-17 NOTE — DISCHARGE PLANNING
Received Stipulation and Order from the court releasing pt from legal hold. Sent copy to unit LSW.    Removed pt from legal hold as of 10/17 at 0866.      .

## 2019-10-17 NOTE — ED NOTES
Patient resting in bed, appears to be sleeping with even rise and fall of chest noted. No obvious signs of pain or distress, sitter in hallway performing direct observation, repositions self occasionally, frequent rounding performed, will continue to assess patient.

## 2019-10-17 NOTE — ED NOTES
Medicated Pt per MAR. Pt requesting shower, Pt ambulate to restroom. Pt has been calm and cooperative throughout the night.

## 2019-10-17 NOTE — ED NOTES
Pt continues to sleep, does not appear distressed. Repositions self as needed, sitter in direct observation.

## 2019-10-17 NOTE — ED NOTES
Pt sleeping on gurney, even rise and fall of chest noted. Sitter in direct observation, will continue to monitor.

## 2019-10-17 NOTE — PSYCHIATRY
"PSYCHIATRIC FOLLOW UP:    Reason for admission: Anxiety/psychosis                    Reason for consult: Anxiety/psychosis        Requesting Physician/APN: Dr. Rasheed        Supervising Psychiatrist: Dr. Beatty    HPI: Mr. Masters is a 25 yo M with a history of panic disorder and TBI who is currently on a legal hold for delusional psychosis.  Patient's legal hold was discontinued by the court yesterday, which the psychiatric team does not agree with due to concerns of medication nonadherence, aggressive behavior and inability to care for self when not in a hospital.  Pt states his mood today as \"good\".  Patient reports he slept well, his appetite is good and he is using the bathroom regularly.  He denies any SI or HI.  He denies any AVH or paranoia.  He denies feeling depressed, anxious or symptoms of christal.  He denies any physical ailments.  He denies any adverse side effects from his medications.  He states his plan for discharge \"get my medications and if I am feeling bad I will walk away from a situation or come back to the hospital\".  Yesterday he had stated a plan of possibly returning to [facility] and going to a Social Security office to obtain an ID and inquire about SSI.  Patient will be provided transportation to the med clinic at [facility] mental health services.  Patient's parents are also in the waiting room of the ER and will be following him to the med clinic.       Psychiatric Examination: observed phenomenon:  Vitals: /81   Pulse (!) 104   Temp 36.5 °C (97.7 °F) (Temporal)   Resp 16   Ht 1.93 m (6' 4\")   Wt 81.6 kg (180 lb)   SpO2 100%   BMI 21.91 kg/m²  Body mass index is 21.91 kg/m².  Review of Systems:  Psychiatric:  Depression: Negative. Denies depressed mood, sleep disturbances, anhedonia, feelings of guilt, low energy, poor concentration, poor appetite, psychomotor disturbances and suicidality.  Anxiety: Negative. Denies excessive anxiety more days than not, " "or associated symptoms such as difficulty concentrating, sleep disturbance and irritability.  Psychosis: Negative. Denies AVH, disorganized thinking or speech.  Ly/Bipolar: Negative. Denies periods of decreased need for sleep, distractability, impulsivity, dangerous activities, flight of ideas.  PTSD: Negative. Denies past trauma or associated symptoms such as nightmares, flashbacks, hypervigilance, avoidance of situations.   Review of Systems   Constitutional: Negative.    HENT: Negative.    Eyes: Negative.    Respiratory: Negative.    Cardiovascular: Negative.    Gastrointestinal: Negative.    Genitourinary: Negative.    Musculoskeletal: Negative.    Skin: Negative.    Neurological: Negative.    Endo/Heme/Allergies: Negative.    Psychiatric/Behavioral: Negative.            Psychiatric Examination: observed phenomenon:  Vitals: /81   Pulse (!) 104   Temp 36.5 °C (97.7 °F) (Temporal)   Resp 16   Ht 1.93 m (6' 4\")   Wt 81.6 kg (180 lb)   SpO2 100%   BMI 21.91 kg/m²  Body mass index is 21.91 kg/m².  General Appearance: 24 y.o.male standing in room in NAD. Moderately kempt and clean.  Behavior: Cooperative and engaged with interview.  Appropriate eye contact.  No aggressive behaviors.  Abnormal Movements: No abnormal movements, muscle spasms or choreiform movements.  Gait and Posture: Normal gait and posture.  Speech: Normal volume.  Normal tone.  Regular rate and rhythm.  Thought Process: Linear, logical and goal-directed  Associations: None  Abnormal or Psychotic Thoughts: Denies AVH, disorganized thinking or delusional thinking.  Judgement and Insight: Fair/poor  Orientation: Alert and oriented x4  Recent and Remote Memory: Intact  Attention Span and Concentration: Intact  Language: English  Fund of Knowledge: Appropriate  Mood:\"Good\" as stated by patient  Affect: Dysthymic, guarded, constricted, congruent with stated mood.  SI/HI: Denies SI and HI.  MMSE score and/or clock drawing: Declines    Soc " history: Patient's mom and sister report patient is not allowed to return to their home due to his aggressive behavior and mood lability.    Lab results/tests:   No results found for this or any previous visit (from the past 48 hour(s)).  No orders to display            Assessment:  1. Psychotic disorder unspc  -R/O schizophrenia: very likely  -Continue Seroquel 50 mg PO BID and 300 mg PO QHS for psychosis. 15 day prescription provided.  -Continue Klonopin 0.5 mg BID, 15 day prescription provided.  -Pt to discharge to [facility] and provided transportation to [facility] clinic to  medications.          Plan:  legal hold:DC per court order.

## 2019-10-17 NOTE — ED NOTES
Pt appears to be resting quietly. Even, easy respirations noted. Does not appear to be distressed, will continue to monitor. Sitter in doorway

## 2019-10-17 NOTE — ED NOTES
"Medicated Pt per MAR. Pt is calm and cooperative, denies other needs. Pt states he feels \"okay\" at this time, anxiety is less than it was earlier.   "

## 2019-10-17 NOTE — ED NOTES
Pt awake and pacing in room, does not appear distressed. Sitter in doorway, will continue to monitor.

## 2019-10-17 NOTE — ED NOTES
Pt kneeling on [address] and facing wall, praying. Pt alternately paces in room. Denies any needs at this time. Sitter in doorway providing direct observation.

## 2019-10-17 NOTE — ED NOTES
Pt given detailed discharge instructions with cab vouchers, a bus pas, meds and Rx, sent out with all belongings, pt verbalized understanding of instructions.  Kelsey with [employer] called a cab for pt and made sure he got in it to go to [facility].

## 2019-10-17 NOTE — ED NOTES
More belongings were found after pt was discharged, pt's mother was notified and will pick them up tomorrow 10/18.  His belongings were placed in locker #4.

## 2019-10-17 NOTE — ED NOTES
Dr. Beatty at the bedside to speak with pt about his discharge.  SW is speaking to his parents in the lobby.

## 2019-10-17 NOTE — ED NOTES
Pt resting comfortably, no needs at this time , bed in lowered position side rails up, sitter in front of room

## 2019-10-18 NOTE — DISCHARGE PLANNING
MSW received court documentation from [facility] that pt's hold is now discontinued. MSW spoke with psychiatry. Pt cleared for discharged and will be d/c with Seroquel. Pt has [employer] and has no money to pay for his medication. MSW faxed pt's prescriptions to [facility] for approved services. Pharmacy called MSW back. Total cost for Serquel for 2 Rx's is $15.91. [facility] work [facility]  pt's medications from healthcare pharmacy and delivered them to MSW. MSW updated psychiatry that pt's Klonopin will need to be provided by med clinc at [facility] or by pt as it is a controlled substance.     MSW request psychiatrist go over instructions and dosage with pt.     MSW met with pt. Pt to d/c to [facility] to get established then to the homeless shelter on [address]. Pt's family cannot have him live with them as he is too violent. Pt aware of plan and agreeable. Pt given multiple community resources as well. MSW provided pt with cab voucher to [facility] and cab voucher to [facility] shelter. Pt stated he doesn't remember things well. MSW wrote instructions and gave them to pt.     Pt's medications given to bedside RN for discharge. MSW updated pt's mother and sister on plan. They will meet him at the [facility] and will f/u with pt at the shelter. No other needs at this time.

## 2019-10-20 ENCOUNTER — HOSPITAL ENCOUNTER (EMERGENCY)
Facility: MEDICAL CENTER | Age: 24
End: 2019-10-30
Attending: EMERGENCY MEDICINE
Payer: COMMERCIAL

## 2019-10-20 DIAGNOSIS — R45.1 AGITATION: ICD-10-CM

## 2019-10-20 LAB — POC BREATHALIZER: 0 PERCENT (ref 0–0.01)

## 2019-10-20 PROCEDURE — 96372 THER/PROPH/DIAG INJ SC/IM: CPT

## 2019-10-20 PROCEDURE — 700111 HCHG RX REV CODE 636 W/ 250 OVERRIDE (IP)

## 2019-10-20 PROCEDURE — 302970 POC BREATHALIZER: Performed by: EMERGENCY MEDICINE

## 2019-10-20 PROCEDURE — 99285 EMERGENCY DEPT VISIT HI MDM: CPT

## 2019-10-20 RX ORDER — HALOPERIDOL 5 MG/ML
5 INJECTION INTRAMUSCULAR EVERY 6 HOURS PRN
Status: DISCONTINUED | OUTPATIENT
Start: 2019-10-20 | End: 2019-10-22

## 2019-10-20 RX ORDER — DIPHENHYDRAMINE HYDROCHLORIDE 50 MG/ML
50 INJECTION INTRAMUSCULAR; INTRAVENOUS ONCE
Status: COMPLETED | OUTPATIENT
Start: 2019-10-20 | End: 2019-10-20

## 2019-10-20 RX ORDER — DIPHENHYDRAMINE HYDROCHLORIDE 50 MG/ML
INJECTION INTRAMUSCULAR; INTRAVENOUS
Status: COMPLETED
Start: 2019-10-20 | End: 2019-10-20

## 2019-10-20 RX ORDER — LORAZEPAM 2 MG/ML
2 INJECTION INTRAMUSCULAR ONCE
Status: COMPLETED | OUTPATIENT
Start: 2019-10-20 | End: 2019-10-20

## 2019-10-20 RX ORDER — LORAZEPAM 2 MG/ML
2 INJECTION INTRAMUSCULAR EVERY 4 HOURS PRN
Status: DISCONTINUED | OUTPATIENT
Start: 2019-10-20 | End: 2019-10-22

## 2019-10-20 RX ORDER — HALOPERIDOL 5 MG/ML
5 INJECTION INTRAMUSCULAR ONCE
Status: COMPLETED | OUTPATIENT
Start: 2019-10-20 | End: 2019-10-20

## 2019-10-20 RX ORDER — LORAZEPAM 2 MG/ML
INJECTION INTRAMUSCULAR
Status: COMPLETED
Start: 2019-10-20 | End: 2019-10-20

## 2019-10-20 RX ORDER — HALOPERIDOL 5 MG/ML
INJECTION INTRAMUSCULAR
Status: COMPLETED
Start: 2019-10-20 | End: 2019-10-20

## 2019-10-20 RX ADMIN — HALOPERIDOL LACTATE 5 MG: 5 INJECTION, SOLUTION INTRAMUSCULAR at 14:30

## 2019-10-20 RX ADMIN — DIPHENHYDRAMINE HYDROCHLORIDE 50 MG: 50 INJECTION, SOLUTION INTRAMUSCULAR; INTRAVENOUS at 14:30

## 2019-10-20 RX ADMIN — DIPHENHYDRAMINE HYDROCHLORIDE 50 MG: 50 INJECTION INTRAMUSCULAR; INTRAVENOUS at 14:30

## 2019-10-20 RX ADMIN — LORAZEPAM 2 MG: 2 INJECTION INTRAMUSCULAR; INTRAVENOUS at 14:30

## 2019-10-20 RX ADMIN — LORAZEPAM 2 MG: 2 INJECTION INTRAMUSCULAR at 14:30

## 2019-10-20 RX ADMIN — HALOPERIDOL 5 MG: 5 INJECTION INTRAMUSCULAR at 14:30

## 2019-10-20 NOTE — ED TRIAGE NOTES
"Pt dana bardales from [facility]. Hx of personality disorder, states \"Some girl is doing black magic on me\". Here visiting family, pt from [place].     Pt speaking in full sentences, a, o x4. Pt demonstrates impaired judgement.    Pt reports he takes Seroquel.   "

## 2019-10-20 NOTE — ED PROVIDER NOTES
"ED Provider Note    CHIEF COMPLAINT  Chief Complaint   Patient presents with   • Psych Eval       HPI  Donald Masters is a 24 y.o. male with a history of schizophrenia who presents via EMS who was called for erratic behavior.    Patient states he needs medication for ADHD which is something he has had for many years.  He feels \"not right in the head.\"    Patient denies chest pain, difficulty breathing, vomiting, diarrhea, abdominal pain, SI/HI.      ALLERGIES  Allergies   Allergen Reactions   • Penicillins        CURRENT MEDICATIONS  Home Medications     Reviewed by Schuyler B Collett, R.N. (Registered Nurse) on 10/20/19 at 1359  Med List Status: <None>   Medication Last Dose Status   QUEtiapine (SEROQUEL) 300 MG tablet  Active   QUEtiapine (SEROQUEL) 50 MG tablet  Active                PAST MEDICAL HISTORY     Schizophrenia    SURGICAL HISTORY  patient denies any surgical history    SOCIAL HISTORY  Social History     Tobacco Use   • Smoking status: Unknown If Ever Smoked   Substance and Sexual Activity   • Alcohol use: Not Currently   • Drug use: Not Currently   • Sexual activity: Not on file       REVIEW OF SYSTEMS  See HPI for further details.  Full review of systems is unobtainable as the patient is disorganized    PHYSICAL EXAM  VITAL SIGNS: /76   Pulse (!) 110   Temp 36.3 °C (97.3 °F) (Temporal)   Resp 16   Ht 1.93 m (6' 4\")   Wt 86.2 kg (190 lb)   SpO2 99%   BMI 23.13 kg/m²     General:  WDWN, nontoxic appearing; paranoid, tachycardic V/S as above  Skin: warm and dry; good color; no rash  HEENT: AT; nodular lesions on the face; EOMs intact; PERRL; no scleral icterus   Neck: FROM; soft  Cardiovascular: Tachycardic heart rate and regular rhythm.  No murmurs, rubs, or gallops; pulses 2+ bilaterally radially   Lungs: Clear to auscultation with good air movement bilaterally.  No wheezes, rhonchi, or rales.   Abdomen: BS present; soft; NTND; no rebound, guarding, or rigidity.  No organomegaly or " pulsatile mass  Extremities: SERNA x 4; no e/o trauma; no pedal edema; neg Seema's  Neurologic: CNs III-XII grossly intact; speech clear; distal sensation intact; strength 5/5 UE/LEs; gait steady  Psychiatric: Appropriate affect, labile and anxious mood; not obviously responding to internal stimuli    LABS  Results for orders placed or performed during the hospital encounter of 10/20/19   POC BREATHALIZER   Result Value Ref Range    POC Breathalizer 0 0.00 - 0.01 Percent         MEDICAL RECORD  I have reviewed patient's medical record and pertinent results are listed below.      COURSE & MEDICAL DECISION MAKING  I have reviewed any medical record information, laboratory studies and radiographic results as noted.    Donald Masters is a 24 y.o. male who presents for evaluation of bizarre behavior and agitation.      Patient attempted to elope from the ER prior to my evaluation.  RN called and requested an order for B-52.  I interviewed the patient who was paranoid and refusing to cooperate, initially.  Security was present.  Patient opted to cooperate by changing into a gown and was given a B-52.  Legal 2000 paperwork was completed.  UDS pending.    [employer] evaluation pending.  Patient will be signed out to the oncoming ERP, Dr. Lama.      FINAL IMPRESSION  1. Agitation         Electronically signed by: Rylie Cain, 10/20/2019 2:10 PM

## 2019-10-20 NOTE — CONSULTS
"      Patient was seen on 10/11/2019 as indicated below with notes.  Patient had been discharged on Friday and advised to go to [facility] for follow up.  Patient did go to [facility] and get and Appointment for a psych eval.  Patient was picked up at [facility] for paranoid thoughts and brought back to ED.  Patient has a hx of TBI and schizophrenia and is very impulsive.  Patient attempted once to run from hospital and was escorted back to room by staff and security.   Clearly as staff thought that a legal guardian may be needed for this client.  Writer did not attempt to re-eval client as he is very labile and reactive at this point.   aware of patients return.  Reviewed with Dr. Lama and agrees need to continue hold and will attempt to get inpt treatment. Marion Hugo RN  10/20/2019        []Hide copied text    []Carolynn for details  [facility] BEHAVIORAL HEALTH   TRIAGE ASSESSMENT     Name: Donald Masters  MRN: [id number]  : 1995  Age: 24 y.o.  Date of assessment: 10/11/2019  PCP: No primary care provider on file.  Persons in attendance: Patient     CHIEF COMPLAINT/PRESENTING ISSUE (as stated by ): Came into the ED with a complaint of anxiety and panic. States he is calm now and is ok to go.  When asked where he would go he stated: 'I know where there are fruit trees\".  States he is used to walking long distances. States he has two fiancee's one in [facility] and one in [place]. State this is normal in his culture which is a mixture of  and [place].  Denies S/I of H/I       Chief Complaint   Patient presents with   • Anxiety       Pt seen at [facility] last night for anxiety also. Requesting  RX         CURRENT LIVING SITUATION/SOCIAL SUPPORT: states he thinks he may be homeless but is not sure.     BEHAVIORAL HEALTH TREATMENT HISTORY  Does patient/parent report a history of prior behavioral health treatment for patient?   states he sees a psychiatrist in [place] " [place]     SAFETY ASSESSMENT - SELF  Does patient acknowledge current or past symptoms of dangerousness to self? no  Does parent/significant other report patient has current or past symptoms of dangerousness to self? N\A  Does presenting problem suggest symptoms of dangerousness to self? No     SAFETY ASSESSMENT - OTHERS  Does patient acknowledge current or past symptoms of aggressive behavior or risk to others? no  Does parent/significant other report patient has current or past symptoms of aggressive behavior or risk to others?  no  Does presenting problem suggest symptoms of dangerousness to others? No     Crisis Safety Plan completed and copy given to patient? pending     ABUSE/NEGLECT SCREENING  Does patient report feeling “unsafe” in his/her home, or afraid of anyone?  no  Does patient report any history of physical, sexual, or emotional abuse?  no  Does parent or significant other report any of the above? no  Is there evidence of neglect by self?  no  Is there evidence of neglect by a caregiver? no  Does the patient/parent report any history of CPS/APS/police involvement related to suspected abuse/neglect or domestic violence? no  Based on the information provided during the current assessment, is a mandated report of suspected abuse/neglect being made?  No     SUBSTANCE USE SCREENING  Yes:  [place] all substances used in the past 30 days:         Last Use Amount   []   Alcohol       [x]   Marijuana 6 months unknwn   []   Heroin       []   Prescription Opioids  (used without prescription, for    recreation, or in excess of prescribed amount)       []   Other Prescription  (used without prescription, for    recreation, or in excess of prescribed amount)       []   Cocaine       [x]   Methamphetamine 2 yrs \" \"   []   \"\" drugs (ectasy, MDMA)       []   Other substances                     UDS results: positive for benzodiazepines  Breathalyzer results: 0.00     What consequences does the patient " "associate with any of the above substance use and or addictive behaviors? None     Risk factors for detox (check all that apply):  []   Seizures   []   Diaphoretic (sweating)   []   Tremors   []   Hallucinations   []   Increased blood pressure   []   Decreased blood pressure   []   Other   []   None       [] Patient education on risk factors for detoxification and instructed to return to ER as needed.        MENTAL STATUS              Participation: Limited verbal participation  Grooming: Casual  Orientation: Disoriented to: day of the week and date. knows its october.  Behavior: Calm  Eye contact: Good  Mood: Anxious  Affect: Expansive  Thought process: Tangential and Flight of ideas  Thought content: Evidence of delusion states he is \"hated\" because he can make so much money.    Speech: Volume within normal limits and Soft  Perception: Illusions  Memory:  Recent:  Limited  Insight: Limited  Judgment:  Limited  Other:     Collateral information:   Source:  [] Significant other present in person:   [] Significant other by telephone  [] [facility]   [] [facility] Nursing Staff  [x] [facility] Medical Record  [] Other:      [] Unable to complete full assessment due to:  [] Acute intoxication  [] Patient declined to participate/engage  [] Patient verbally unresponsive  [] Significant cognitive deficits  [] Significant perceptual distortions or behavioral disorganization  [] Other:                  CLINICAL IMPRESSIONS:  Primary:  Anxiety disorder, panic attack, psychosis  Secondary:  Status post TBI                                IDENTIFIED NEEDS/PLAN:  [Trigger DISPOSITION list for any items marked]     []  Imminent safety risk - self []  Imminent safety risk - others   []  Acute substance withdrawal [x]  Psychosis/Impaired reality testing   [x]  Mood/anxiety []  Substance use/Addictive behavior   []  Maladaptive behaviro []  Parent/child conflict   []  Family/Couples conflict []  Biomedical   []  Housing []  " Financial   []   Legal   Occupational/Educational   []  Domestic violence []  Other:      Disposition: Refer to Legal Hold     Does patient express agreement with the above plan? no     Referral appointment(s) scheduled? N\A     Alert team only:   I have discussed findings and recommendations with Dr. merrill who is in agreement with these recommendations.      Referral information sent to the following community providers : Psychiatric team to assess.     If applicable : Referred  to : [facility] for legal hold follow up at (time): 10:00        Megan Maki R.N.  10/11/2019                            ED on 10/10/2019          Routing History          Detailed Report

## 2019-10-20 NOTE — ED NOTES
At approx 1430 pt spontaneously attempted to elope. Pt redirected to room after making it as far as [facility] and back. Pt then voluntarily complied with medication administration per orders. Pt now resting in [facility].

## 2019-10-21 LAB
AMPHET UR QL SCN: POSITIVE
BARBITURATES UR QL SCN: NEGATIVE
BENZODIAZ UR QL SCN: NEGATIVE
BZE UR QL SCN: NEGATIVE
CANNABINOIDS UR QL SCN: NEGATIVE
METHADONE UR QL SCN: NEGATIVE
OPIATES UR QL SCN: NEGATIVE
OXYCODONE UR QL SCN: NEGATIVE
PCP UR QL SCN: NEGATIVE
PROPOXYPH UR QL SCN: NEGATIVE

## 2019-10-21 PROCEDURE — 96372 THER/PROPH/DIAG INJ SC/IM: CPT

## 2019-10-21 PROCEDURE — 80307 DRUG TEST PRSMV CHEM ANLYZR: CPT

## 2019-10-21 PROCEDURE — 700111 HCHG RX REV CODE 636 W/ 250 OVERRIDE (IP): Performed by: EMERGENCY MEDICINE

## 2019-10-21 PROCEDURE — 99284 EMERGENCY DEPT VISIT MOD MDM: CPT | Performed by: PSYCHIATRY & NEUROLOGY

## 2019-10-21 RX ORDER — ARIPIPRAZOLE 10 MG/1
5 TABLET ORAL DAILY
Status: DISCONTINUED | OUTPATIENT
Start: 2019-10-22 | End: 2019-10-23

## 2019-10-21 RX ADMIN — LORAZEPAM 2 MG: 2 INJECTION INTRAMUSCULAR; INTRAVENOUS at 20:02

## 2019-10-21 RX ADMIN — HALOPERIDOL LACTATE 5 MG: 5 INJECTION, SOLUTION INTRAMUSCULAR at 20:02

## 2019-10-21 RX ADMIN — HALOPERIDOL LACTATE 5 MG: 5 INJECTION, SOLUTION INTRAMUSCULAR at 07:44

## 2019-10-21 RX ADMIN — LORAZEPAM 2 MG: 2 INJECTION INTRAMUSCULAR; INTRAVENOUS at 07:44

## 2019-10-21 ASSESSMENT — ENCOUNTER SYMPTOMS
CARDIOVASCULAR NEGATIVE: 1
EYES NEGATIVE: 1
NERVOUS/ANXIOUS: 1
MUSCULOSKELETAL NEGATIVE: 1
NEUROLOGICAL NEGATIVE: 1
CONSTITUTIONAL NEGATIVE: 1
GASTROINTESTINAL NEGATIVE: 1
RESPIRATORY NEGATIVE: 1

## 2019-10-21 ASSESSMENT — LIFESTYLE VARIABLES: DO YOU DRINK ALCOHOL: NO

## 2019-10-21 NOTE — ED NOTES
Pt sleeping. Sitter in doorway. Vs obtained by ed tech. Pt educated on need to provide urine sample, states inability to do so at this time-- to notify RN when able

## 2019-10-21 NOTE — PSYCHIATRY
"PSYCHIATRIC INTAKE EVALUATION    *Reason for admission: pt was here 2 weeks ago floridly psychotic and improved. He has cognitive problems as well. He was released from his legal hold on 10/17.  He is here for erratic behavior and not feeling \"right in the head\".                   *Reason for consult: psychosis  *Requesting Physician/APN:  LAURI Cain MD             Legal Hold status: +           *Chief Complaint: \"my memory got lost\"     *HPI (includes Psychiatric ROS):  23 yo male who stayed at a hotel since he was released by the court. He says he took his seroquel but was unable to get his clonazepam. He is unable to explain what happened that he was brought here. Used meth once because he was \"bored\".  Per notes: \"Some girl is doing black magic on me\". His main complaint is anxiety.    Alert team: complaint of anxiety and panic. States he is calm now and is ok to go.  When asked where he would go he stated: 'I know where there are fruit trees\". .....     Pt seen at [facility] last night for anxiety also.     *Medical Review Of Symptoms (not dx conditions):   Review of Systems   Constitutional: Negative.    HENT: Negative.    Eyes: Negative.    Respiratory: Negative.    Cardiovascular: Negative.    Gastrointestinal: Negative.    Genitourinary: Negative.    Musculoskeletal: Negative.    Skin: Negative.    Neurological: Negative.    Psychiatric/Behavioral: The patient is nervous/anxious.      *Psychiatric Examination:   Vitals:Blood pressure 114/74, pulse 74, temperature 36.6 °C (97.8 °F), temperature source Temporal, resp. rate 14, height 1.93 m (6' 4\"), weight 86.2 kg (190 lb), SpO2 99 %.  General Appearance: intermittent to good eye contact. tired.   Abnormal Movements: none  Gait and Posture: lying in bed  Speech:slow  Thought Process: slow rate   Associations:linear  Abnormal or Psychotic Thoughts:delusions   Judgement and Insight:impaired  Orientation:grossly   Recent and Remote Memory: grossly intact  Attention " "Span and Concentration:intact  Language:fluid  Fund of Knowledge: \"tired\"  SI/HI: denies     *PAST MEDICAL/PSYCH/FAMILY/SOCIAL(as reported by patient):       *medical hx:        TBI: + with residual memory impairment per mom  SZ:\" panic attacks\" which he equates to sz.  No past medical history on file.  No past surgical history on file.     *psychiatric hx: no new hospitalizations or other events since last admit other than noted above.     *family Psych hx:  None known     *social hx: was to go to family in [place], [place]. Couldn't figure out how to. See prior admit  Alcohol: denies  Drugs:as noted     *MEDICAL HX: labs, MARS, medications, etc were reviewed. Only those findings of potential interest to psychiatry are noted below:    *Current Medical issues:  None acutely      *Allergies:  Allergies   Allergen Reactions   • Penicillins      *Current Medications:    Current Facility-Administered Medications:   •  LORazepam (ATIVAN) injection 2 mg, 2 mg, Intramuscular, Q4HRS PRN, Chandni Lama M.D., 2 mg at 10/21/19 0744  •  haloperidol lactate (HALDOL) injection 5 mg, 5 mg, Intramuscular, Q6HRS PRN, Chandni Lama M.D., 5 mg at 10/21/19 0744    Current Outpatient Medications:   •  QUEtiapine (SEROQUEL) 300 MG tablet, Take 1 Tab by mouth every evening., Disp: 15 Tab, Rfl: 0  •  QUEtiapine (SEROQUEL) 50 MG tablet, Take 1 Tab by mouth 2 Times a Day., Disp: 30 Tab, Rfl: 0  *EKG: none  *Imaging: none   EEG:  none     *Labs:  No results for input(s): WBC, RBC, HEMOGLOBIN, HEMATOCRIT, MCV, MCH, RDW, PLATELETCT, MPV, NEUTSPOLYS, LYMPHOCYTES, MONOCYTES, EOSINOPHILS, BASOPHILS, RBCMORPHOLO in the last 72 hours.  No results found for: SODIUM, POTASSIUM, CHLORIDE, CO2, GLUCOSE, BUN, CREATININE, BUNCREATRAT, GLOMRATE      Lab Results   Component Value Date/Time    BREATHALIZER 0 10/20/2019 1441     No components found for: BLOODALCOHOL   Lab Results   Component Value Date/Time    AMPHUR Positive (A) 10/21/2019 0330    BARBSURINE " "Negative 10/21/2019 0330    BENZODIAZU Negative 10/21/2019 0330    COCAINEMET Negative 10/21/2019 0330    METHADONE Negative 10/21/2019 0330    OPIATES Negative 10/21/2019 0330    OXYCODN Negative 10/21/2019 0330    PCPURINE Negative 10/21/2019 0330    PROPOXY Negative 10/21/2019 0330    CANNABINOID Negative 10/21/2019 0330     No results found for: TSH, FREET4      *ASSESSMENT/PLAN:  1. Schizophrenia:   -pt was released by the court form his legal hold on 10/17.   - will start abilify: if he tolerates it, will try to get him on an injectable.         2.Neurocognitive disorder unspc  -hx of TBI with residual memory deficits.     3. Methamphetamine use  -\"I use 6 times a year\"    4.Medical  -non acutely     Legal hold: extended. Needs inpt psych.   Other:   Visitors: Family. Clear with pt first.   Phone calls: \"                         \"     *Will Follow  *Thank you for the consult    "

## 2019-10-21 NOTE — ED NOTES
Patient resting comfortably. Chest rise and fall noted. Patient in direct observation of sitter. Will Continue to monitor.

## 2019-10-21 NOTE — ED NOTES
Med rec complete per historical. Pt was DC'd from [facility] on 10/17/19.

## 2019-10-21 NOTE — ED NOTES
Pt awake and alert asking to talk to family. Pt cooperative and easily redirectable. Sitter in direct obs.

## 2019-10-21 NOTE — DISCHARGE PLANNING
Medical Social Work    Referral: Legal Hold    Intervention: Legal Hold Paperwork given to SW by Life Skills RN: Marion    Legal Hold Initiated: Date: 10/20/2019  Time: 1433    Legal Hold faxed: Date: 10/21/2019  Time: 3235    Patient’s Insurance Listed on Face Sheet: [employer]    Referrals sent to: [facility] and [facility]    Plan: Patient will transfer to mental health facility once acceptance is obtained.

## 2019-10-21 NOTE — ED NOTES
"Patient provided with food and beverage. Patient consumed each without difficulty. Patient reports that he is unable to provide a urine specimen but is agreeable when he is able, per his report. Patient states, \"i'm going to sleep for the duration of my hold. Whatever is left of my 72 hours I'm just going to sleep.\"  "

## 2019-10-21 NOTE — ED NOTES
Pt observed resting in gurney at this time, presumably sleeping, no s/s of discomfort or distress at this time. Unlabored breathing & visible chest rise noted. Pt repositions self occasionally. Bed in lowest position, room & floor clear. Sitter in doorway performing direct observation. Pt in line of sight from RN station.

## 2019-10-22 PROCEDURE — 700102 HCHG RX REV CODE 250 W/ 637 OVERRIDE(OP): Performed by: PSYCHIATRY & NEUROLOGY

## 2019-10-22 PROCEDURE — A9270 NON-COVERED ITEM OR SERVICE: HCPCS | Performed by: PSYCHIATRY & NEUROLOGY

## 2019-10-22 PROCEDURE — 99283 EMERGENCY DEPT VISIT LOW MDM: CPT | Mod: GC | Performed by: PSYCHIATRY & NEUROLOGY

## 2019-10-22 PROCEDURE — 700102 HCHG RX REV CODE 250 W/ 637 OVERRIDE(OP): Performed by: STUDENT IN AN ORGANIZED HEALTH CARE EDUCATION/TRAINING PROGRAM

## 2019-10-22 PROCEDURE — A9270 NON-COVERED ITEM OR SERVICE: HCPCS | Performed by: STUDENT IN AN ORGANIZED HEALTH CARE EDUCATION/TRAINING PROGRAM

## 2019-10-22 RX ORDER — DIPHENHYDRAMINE HCL 25 MG
50 TABLET ORAL
Status: DISCONTINUED | OUTPATIENT
Start: 2019-10-22 | End: 2019-10-30 | Stop reason: HOSPADM

## 2019-10-22 RX ORDER — HALOPERIDOL 5 MG/1
5 TABLET ORAL
Status: DISCONTINUED | OUTPATIENT
Start: 2019-10-22 | End: 2019-10-30 | Stop reason: HOSPADM

## 2019-10-22 RX ORDER — LORAZEPAM 2 MG/1
2 TABLET ORAL
Status: DISCONTINUED | OUTPATIENT
Start: 2019-10-22 | End: 2019-10-30 | Stop reason: HOSPADM

## 2019-10-22 RX ORDER — DIPHENHYDRAMINE HYDROCHLORIDE 50 MG/ML
50 INJECTION INTRAMUSCULAR; INTRAVENOUS
Status: DISCONTINUED | OUTPATIENT
Start: 2019-10-22 | End: 2019-10-30 | Stop reason: HOSPADM

## 2019-10-22 RX ORDER — HALOPERIDOL 5 MG/ML
5 INJECTION INTRAMUSCULAR
Status: DISCONTINUED | OUTPATIENT
Start: 2019-10-22 | End: 2019-10-30 | Stop reason: HOSPADM

## 2019-10-22 RX ORDER — LORAZEPAM 2 MG/ML
2 INJECTION INTRAMUSCULAR
Status: DISCONTINUED | OUTPATIENT
Start: 2019-10-22 | End: 2019-10-30 | Stop reason: HOSPADM

## 2019-10-22 RX ADMIN — LORAZEPAM 2 MG: 2 TABLET ORAL at 21:02

## 2019-10-22 RX ADMIN — ARIPIPRAZOLE 5 MG: 10 TABLET ORAL at 06:49

## 2019-10-22 RX ADMIN — LORAZEPAM 2 MG: 2 TABLET ORAL at 14:48

## 2019-10-22 ASSESSMENT — ENCOUNTER SYMPTOMS
EYES NEGATIVE: 1
MUSCULOSKELETAL NEGATIVE: 1
NEUROLOGICAL NEGATIVE: 1
CONSTITUTIONAL NEGATIVE: 1
RESPIRATORY NEGATIVE: 1
NERVOUS/ANXIOUS: 1
HALLUCINATIONS: 1
CARDIOVASCULAR NEGATIVE: 1
GASTROINTESTINAL NEGATIVE: 1

## 2019-10-22 ASSESSMENT — LIFESTYLE VARIABLES: SUBSTANCE_ABUSE: 1

## 2019-10-22 NOTE — ED NOTES
Pt medicated per MAR. VSS, pt fell asleep quickly after being medicated. Will continue to monitor.

## 2019-10-22 NOTE — PROGRESS NOTES
2:16 PM  Patient was signed out to me by the off going ERP this morning.  No acute issues were noted on my shift.  Patient was checked on and offered no acute complaints.  Patient's care will be transferred over to the oncoming ERP for further monitoring while we await psychiatric transfer.

## 2019-10-22 NOTE — ED NOTES
Pt sleeping comfortably in bed, breathing is easy and unlabored. No s/s of distress noted. Sitter at bedside. Will continue to monitor.

## 2019-10-22 NOTE — DISCHARGE PLANNING
Legal Hold    Referral: Legal Hold Court     Intervention: Pt presented for legal hold meeting with .  advised pt will meet with court MD's via telemedicine monitor to contest the legal hold.      Plan: Pt will present to telemedicine mental health to meet with court physicians 10/23. Will call bedside RN once time has been determined

## 2019-10-22 NOTE — PSYCHIATRY
BRIEF PSYCHIATRIC CONSULT NOTE: patient seen, full note to follow.    Pt continues to be very psychotic, responding to internal stimuli, internally preoccupied, with intense stare at times, and having AVH. Continue current regimen of Abilify 5mg Po daily for psychosis started yesterday.     -Legal Hold:extended  -Sitter:yes  -Restrictions:   -phone:no   -visitors:no   -personal items: no   -finger foods required: no metal silverware    -personal/undergarments clothes:yes  -Orders Placed: routine  -Assessment:  -Plan:continue to follow

## 2019-10-22 NOTE — PSYCHIATRY
"PSYCHIATRIC FOLLOW UP:    Reason for admission: pt was here 2 weeks ago floridly psychotic and improved. He has cognitive problems as well. He was released from his legal hold on 10/17.  He is here for erratic behavior and not feeling \"right in the head\".                                    Reason for consult: Psychosis       Requesting Physician/APN: Dr. Cain       Supervising Psychiatrist: Dr. Law     HPI: Mr. Bennett is a 24-year-old male known to this service with a history of TBI with residual memory deficit and schizophrenia currently on a legal hold for inability to care for self due to psychosis.  Patient states his mood today as \"patience\" and rates his mood at 7 out of 10 with 10 being the best mood.  He states he slept well, his appetite is good and he is using the bathroom regularly.  During the interview he stares at his reflection in a window and states \"I do not know who I am I keep changing to someone else\".  Furthermore, he states \"something else is taking over me.  Magaly Farrar is performing black magic on me and changing my thoughts and my perception of people\".  \"I am a little too special, my reflexes are really good and I have special hybrid DNA kind of like Brice Kim from Spider-Man\".  He denies feeling depressed and denies AVH, but is clearly responding to internal stimuli.  He reports anxiety related to being in the hospital.  He denies any physical ailments at this time.  He denies any adverse side effects from his medications.  He is agreeable to be transferred to an acute psychiatric hospital when a bed is available.       Psychiatric Examination: observed phenomenon:  Vitals: /65   Pulse 69   Temp 36.6 °C (97.9 °F) (Temporal)   Resp 15   Ht 1.93 m (6' 4\")   Wt 86.2 kg (190 lb)   SpO2 98%   BMI 23.13 kg/m²  Body mass index is 23.13 kg/m².  Review of Systems:  Psychiatric:  Depression: Negative. Denies depressed mood, sleep disturbances, anhedonia, feelings of guilt, low " "energy, poor concentration, poor appetite, psychomotor disturbances and suicidality.  Anxiety: Positive.  Reports excessive anxiety more days than not and associated symptoms such as difficulty concentrating, sleep disturbance and irritability.  Psychosis: Positive. Denies AVH, positive for disorganized thinking and speech.  Ly/Bipolar: Negative. Denies periods of decreased need for sleep, distractability, impulsivity, dangerous activities, flight of ideas.  PTSD: Negative. Denies past trauma or associated symptoms such as nightmares, flashbacks, hypervigilance, avoidance of situations.   Review of Systems   Constitutional: Negative.    HENT: Negative.    Eyes: Negative.    Respiratory: Negative.    Cardiovascular: Negative.    Gastrointestinal: Negative.    Genitourinary: Negative.    Musculoskeletal: Negative.    Skin: Negative.    Neurological: Negative.    Endo/Heme/Allergies: Negative.    Psychiatric/Behavioral: Positive for hallucinations and substance abuse. The patient is nervous/anxious.            Psychiatric Examination: observed phenomenon:  Vitals: /65   Pulse 69   Temp 36.6 °C (97.9 °F) (Temporal)   Resp 15   Ht 1.93 m (6' 4\")   Wt 86.2 kg (190 lb)   SpO2 98%   BMI 23.13 kg/m²  Body mass index is 23.13 kg/m².  General Appearance: 24 y.o.male pacing in room in NAD. Moderately kempt and clean.  Behavior: Cooperative and engaged with interview.  Appropriate eye contact.  No aggressive behaviors.  Responding to internal stimuli and looking at his reflection throughout the interview.  Abnormal Movements: No abnormal movements, muscle spasms or choreiform movements.  Gait and Posture: Normal gait and posture.  Speech: Normal volume.  Normal tone.  Regular rate and rhythm.  Thought Process: Disorganized mostly, linear at times.  Some thought blocking.  Associations: None  Abnormal or Psychotic Thoughts: Denies AVH, positive for disorganized thinking and delusional thinking as noted " "above.  Judgement and Insight: Poor/poor  Orientation: Alert and oriented x4  Recent and Remote Memory: Intact  Attention Span and Concentration: Intact  Language: English  Fund of Knowledge: Appropriate  Mood:\" Patience\" as stated by patient.  Affect: Euthymic, smiles inappropriately as noted above, congruent with stated mood.  SI/HI: Denies SI and HI.  MMSE score and/or clock drawing: Declines    Soc history:  Pt reports Magaly Masters has been performing \"black magic\" on him for months.  It is unclear if this person is related to him or if this is just a delusion of his.    Lab results/tests:   Recent Results (from the past 48 hour(s))   POC BREATHALIZER    Collection Time: 10/20/19  2:41 PM   Result Value Ref Range    POC Breathalizer 0 0.00 - 0.01 Percent   URINE DRUG SCREEN    Collection Time: 10/21/19  3:30 AM   Result Value Ref Range    Amphetamines Urine Positive (A) Negative    Barbiturates Negative Negative    Benzodiazepines Negative Negative    Cocaine Metabolite Negative Negative    Methadone Negative Negative    Opiates Negative Negative    Oxycodone Negative Negative    Phencyclidine -Pcp Negative Negative    Propoxyphene Negative Negative    Cannabinoid Metab Negative Negative     No orders to display            Assessment:  1.  Schizophrenia  -Continue Abilify 5 mg p.o. daily for psychosis.  -Continue Haldol 5 mg, Ativan 2 mg and Benadryl 50 mg as needed for agitation.  -Transfer to an acute psychiatric hospital when a bed is available.    2.  Neurocognitive disorder unspecified  -History of TBI with residual memory deficits.    3.  Methamphetamine use disorder  -Refer for substance use counseling.          Plan:  legal hold: Extended  "

## 2019-10-22 NOTE — ED NOTES
Pt sleeping comfortably in bed, breathing is easy and unlabored. No s/s of distress noted. No new needs identified, Sitter at bedside. Will continue to monitor.

## 2019-10-22 NOTE — ED NOTES
Pt sleeping comfortably in bed, breathing is easy and unlabored, no s/s of distress noted. Sitter at bedside,  No new needs identified, Pt turns self. Will continue to monitor.

## 2019-10-22 NOTE — ED NOTES
Patient remains in room. Sitter outside door. Patient denies needs at this time. Will continue to monitor patient.

## 2019-10-22 NOTE — ED NOTES
Pt sleeping comfortably in bed, breathing is easy and unlabored, no s/s of distress noted. Sitter at bedside, Will continue to monitor.

## 2019-10-22 NOTE — ED TRIAGE NOTES
Patient is a 24 y.o male with a hx of schizophrenia who presents on 10/20 brought in from EMS. Patient's chief complaint is he is no longer feeling right in the head and believes he has undiagnosed ADHD.     Patient's home medications have been reviewed by the pharmacy team.     No past medical history on file.    Patient's Medications   New Prescriptions    No medications on file   Previous Medications    QUETIAPINE (SEROQUEL) 300 MG TABLET    Take 1 Tab by mouth every evening.    QUETIAPINE (SEROQUEL) 50 MG TABLET    Take 1 Tab by mouth 2 Times a Day.   Modified Medications    No medications on file   Discontinued Medications    No medications on file          A:  Patient not taking home seroquel  may be a reason contributing to his psychosis episode.       P:    Patient per psych consult is started on a new atypical antipsychotic Abilify 5 mg daily, with a plan to switch patient to longer acting injectable. Besides psych issues patient has no other immediate health concerns that would need to be addressed.       Rigoberto KeyD    COSIGN:  Adiel Hein PharmD, BCPS

## 2019-10-23 PROCEDURE — 99283 EMERGENCY DEPT VISIT LOW MDM: CPT | Mod: GC | Performed by: PSYCHIATRY & NEUROLOGY

## 2019-10-23 PROCEDURE — 700102 HCHG RX REV CODE 250 W/ 637 OVERRIDE(OP): Performed by: PSYCHIATRY & NEUROLOGY

## 2019-10-23 PROCEDURE — A9270 NON-COVERED ITEM OR SERVICE: HCPCS | Performed by: PSYCHIATRY & NEUROLOGY

## 2019-10-23 PROCEDURE — A9270 NON-COVERED ITEM OR SERVICE: HCPCS | Performed by: STUDENT IN AN ORGANIZED HEALTH CARE EDUCATION/TRAINING PROGRAM

## 2019-10-23 PROCEDURE — 700102 HCHG RX REV CODE 250 W/ 637 OVERRIDE(OP): Performed by: EMERGENCY MEDICINE

## 2019-10-23 PROCEDURE — A9270 NON-COVERED ITEM OR SERVICE: HCPCS | Performed by: EMERGENCY MEDICINE

## 2019-10-23 PROCEDURE — 700102 HCHG RX REV CODE 250 W/ 637 OVERRIDE(OP): Performed by: STUDENT IN AN ORGANIZED HEALTH CARE EDUCATION/TRAINING PROGRAM

## 2019-10-23 RX ORDER — CLONAZEPAM 0.5 MG/1
1 TABLET ORAL ONCE
Status: COMPLETED | OUTPATIENT
Start: 2019-10-23 | End: 2019-10-23

## 2019-10-23 RX ORDER — ARIPIPRAZOLE 10 MG/1
10 TABLET ORAL DAILY
Status: DISCONTINUED | OUTPATIENT
Start: 2019-10-24 | End: 2019-10-24

## 2019-10-23 RX ORDER — CLONAZEPAM 0.5 MG/1
0.5 TABLET ORAL 2 TIMES DAILY
Status: DISCONTINUED | OUTPATIENT
Start: 2019-10-23 | End: 2019-10-30 | Stop reason: HOSPADM

## 2019-10-23 RX ADMIN — ARIPIPRAZOLE 5 MG: 10 TABLET ORAL at 06:51

## 2019-10-23 RX ADMIN — CLONAZEPAM 0.5 MG: 0.5 TABLET ORAL at 11:02

## 2019-10-23 RX ADMIN — CLONAZEPAM 1 MG: 0.5 TABLET ORAL at 13:10

## 2019-10-23 RX ADMIN — CLONAZEPAM 0.5 MG: 0.5 TABLET ORAL at 18:18

## 2019-10-23 RX ADMIN — LORAZEPAM 2 MG: 2 TABLET ORAL at 20:01

## 2019-10-23 ASSESSMENT — ENCOUNTER SYMPTOMS
CONSTITUTIONAL NEGATIVE: 1
MUSCULOSKELETAL NEGATIVE: 1
HALLUCINATIONS: 1
RESPIRATORY NEGATIVE: 1
EYES NEGATIVE: 1
NEUROLOGICAL NEGATIVE: 1
GASTROINTESTINAL NEGATIVE: 1
NERVOUS/ANXIOUS: 1
CARDIOVASCULAR NEGATIVE: 1

## 2019-10-23 NOTE — ED NOTES
Pt sleeping comfortably in bed, breathing is easy and unlabored. No s/s of distress, sitter at bedside, Will continue to monitor.

## 2019-10-23 NOTE — DISCHARGE PLANNING
Legal Hold    Referral: Legal Hold Court     Pt presented for telemedicine mental health to meet with court physicians. Pt will need to see  at [facility] 10/24 at 0900.    Contacted charge RN and let her know we will need to have either a RN or CNA go with patient.     Spoke to Security Sup Chuck and arranged  to go along with RN/CNA and patient.    Arranged round trip transport to [facility] via [facility] for 0800.    Filled out approved services form and faxed to [facility]. Pending Supervisor approval at this time.

## 2019-10-23 NOTE — ED NOTES
Received report from Chelsey CLEANING. Pt care responsibilities assumed. Pt resting in bed, Sitter at bedside. Will continue to monitor.

## 2019-10-23 NOTE — ED NOTES
Patient sleeping. chest rising and falling. PSA at bedside for 1:1 observation of patient. No needs at this time, will continue to monitor.

## 2019-10-23 NOTE — DISCHARGE PLANNING
ALERT team  note:  24 year old male admitted 10/20/19, legal hold, inability to care for self; pt evaluated by [facility] ED psychiatry team, legal hold cont; received Klonopin 0/5 mg PO this AM; [facility]

## 2019-10-23 NOTE — PSYCHIATRY
"PSYCHIATRIC FOLLOW UP:    Reason for admission: pt was here 2 weeks ago floridly psychotic and improved. He has cognitive problems as well. He was released from his legal hold on 10/17.  He is here for erratic behavior and not feeling \"right in the head\".                                    Reason for consult: Psychosis       Requesting Physician/APN: Dr. Cain    Supervising Psychiatrist: Dr. Law     HPI:  Mr. Bennett is a 24-year-old male known to this service with a history of TBI with residual memory deficit and schizophrenia currently on a legal hold for inability to care for self due to psychosis.  Patient states his mood today as \" better\" and rates his mood at 9 out of 10 with 10 being the best mood possible.  He states he slept \"pretty good\", his appetite is \"all right\" and is using the bathroom regularly.  He denies any SI or HI.  He denies AVH, but is responding to internal stimuli, smiling inappropriately and staring in his reflection in a window again today.  When asked what is making him smile inappropriately he declines to answer.  He continues to have delusional thinking of \"black magic\" and states \"she stops my memory, my breathing, my concentration and my ability to absorb food\".  Furthermore, he states \"everyone here is talking about me and on the streets, they say I am a racist or a bad person and that I can play characters and movies like the joker\".  He states that he does not want to take Abilify, but has been adherent to this medication since admission.  He seems to be more agreeable to take medication and states that he needs to read the Koran and take medication to help with his psychiatric issues.  He also states that he feels anxious while in the hospital and is agreeable to start Klonopin 0.5 mg p.o. twice daily for this issue.  Patient is scheduled to speak with the court doctors today and says this is making him feel anxious.  He is agreeable to be transferred to an acute " "psychiatric hospital when a bed is available.       Psychiatric Examination: observed phenomenon:  Vitals: /54   Pulse 93   Temp 36.5 °C (97.7 °F) (Temporal)   Resp 17   Ht 1.93 m (6' 4\")   Wt 86.2 kg (190 lb)   SpO2 96%   BMI 23.13 kg/m²  Body mass index is 23.13 kg/m².  Review of Systems:  Psychiatric:  Depression: Negative. Denies depressed mood, sleep disturbances, anhedonia, feelings of guilt, low energy, poor concentration, poor appetite, psychomotor disturbances and suicidality.  Anxiety: Positive.  Reports excessive anxiety more days than not and associated symptoms such as difficulty concentrating, sleep disturbance and irritability.  Psychosis: Positive. Denies AVH, positive for disorganized thinking and speech.  Ly/Bipolar: Negative. Denies periods of decreased need for sleep, distractability, impulsivity, dangerous activities, flight of ideas.  PTSD: Negative. Denies past trauma or associated symptoms such as nightmares, flashbacks, hypervigilance, avoidance of situations.  Review of Systems   Constitutional: Negative.    HENT: Negative.    Eyes: Negative.    Respiratory: Negative.    Cardiovascular: Negative.    Gastrointestinal: Negative.    Genitourinary: Negative.    Musculoskeletal: Negative.    Skin: Negative.    Neurological: Negative.    Endo/Heme/Allergies: Negative.    Psychiatric/Behavioral: Positive for hallucinations. The patient is nervous/anxious.         Psychiatric Examination: observed phenomenon:  Vitals: /54   Pulse 93   Temp 36.5 °C (97.7 °F) (Temporal)   Resp 17   Ht 1.93 m (6' 4\")   Wt 86.2 kg (190 lb)   SpO2 96%   BMI 23.13 kg/m²  Body mass index is 23.13 kg/m².  General Appearance: 24 y.o.male pacing in room in NAD. Moderately kempt and clean.  Behavior: Cooperative and engaged with interview.  Appropriate eye contact.  No aggressive behaviors.  Responding to internal stimuli and looking at his reflection throughout the interview.  Abnormal Movements: " "No abnormal movements, muscle spasms or choreiform movements.  Gait and Posture: Normal gait and posture.  Speech: Normal volume.  Normal tone.  Regular rate and rhythm.  Thought Process: Disorganized mostly, linear at times.  Some thought blocking.  Associations: None  Abnormal or Psychotic Thoughts: Denies AVH, positive for disorganized thinking and delusional thinking as noted above.  Judgement and Insight: Poor/poor  Orientation: Alert and oriented x4  Recent and Remote Memory: Intact  Attention Span and Concentration: Intact  Language: English  Fund of Knowledge: Appropriate  Mood:\" Better\" as stated by patient.  Affect: Euthymic, smiles inappropriately as noted above, congruent with stated mood.  SI/HI: Denies SI and HI.  MMSE score and/or clock drawing: Declines    Soc history:  Pt reports he would like to visit [facility] to visit his fiancée whom he met while on a [place] pilgrimage.    Lab results/tests:   No results found for this or any previous visit (from the past 48 hour(s)).  No orders to display            Assessment:  1.  Schizophrenia  -Increase Abilify from 5 mg to 10 mg p.o. daily for psychosis.  Please ensure patient is taking this medication as he states he does not want to take it, but has been adherent during this admission.  -Start Klonopin 0.5 mg p.o. twice daily for anxiety.  -Continue Haldol 5 mg, Ativan 2 mg and Benadryl 50 mg as needed for agitation.  -Transfer to an acute psychiatric hospital when a bed is available.     2.  Neurocognitive disorder unspecified  -History of TBI with residual memory deficits.     3.  Methamphetamine use disorder  -Refer for substance use counseling.          Plan:  legal hold:Extended  "

## 2019-10-23 NOTE — ED PROVIDER NOTES
"ED Provider Note    Patient CARE is transferred to me from the nighttime ERP.  Patient is here on a legal hold.  He has a history of schizophrenia.  His only request at this time, is something to help him be more \"patient\".  He has been treated with Klonopin in the past.  No events under my care.  Patient appears comfortable and has no other requests.  Patient care will be transferred to oncoming ERP at the close of my shift.  He is awaiting transfer to a psychiatric facility.      "

## 2019-10-24 LAB — EKG IMPRESSION: NORMAL

## 2019-10-24 PROCEDURE — 700102 HCHG RX REV CODE 250 W/ 637 OVERRIDE(OP): Performed by: PSYCHIATRY & NEUROLOGY

## 2019-10-24 PROCEDURE — 96372 THER/PROPH/DIAG INJ SC/IM: CPT

## 2019-10-24 PROCEDURE — 700111 HCHG RX REV CODE 636 W/ 250 OVERRIDE (IP): Performed by: STUDENT IN AN ORGANIZED HEALTH CARE EDUCATION/TRAINING PROGRAM

## 2019-10-24 PROCEDURE — 99283 EMERGENCY DEPT VISIT LOW MDM: CPT | Performed by: PSYCHIATRY & NEUROLOGY

## 2019-10-24 PROCEDURE — 700102 HCHG RX REV CODE 250 W/ 637 OVERRIDE(OP): Performed by: STUDENT IN AN ORGANIZED HEALTH CARE EDUCATION/TRAINING PROGRAM

## 2019-10-24 PROCEDURE — A9270 NON-COVERED ITEM OR SERVICE: HCPCS | Performed by: PSYCHIATRY & NEUROLOGY

## 2019-10-24 PROCEDURE — A9270 NON-COVERED ITEM OR SERVICE: HCPCS | Performed by: STUDENT IN AN ORGANIZED HEALTH CARE EDUCATION/TRAINING PROGRAM

## 2019-10-24 PROCEDURE — 93005 ELECTROCARDIOGRAM TRACING: CPT | Performed by: PSYCHIATRY & NEUROLOGY

## 2019-10-24 PROCEDURE — 700102 HCHG RX REV CODE 250 W/ 637 OVERRIDE(OP): Performed by: EMERGENCY MEDICINE

## 2019-10-24 PROCEDURE — A9270 NON-COVERED ITEM OR SERVICE: HCPCS | Performed by: EMERGENCY MEDICINE

## 2019-10-24 RX ORDER — RISPERIDONE 1 MG/1
1 TABLET ORAL 2 TIMES DAILY
Status: DISCONTINUED | OUTPATIENT
Start: 2019-10-24 | End: 2019-10-26

## 2019-10-24 RX ORDER — NICOTINE 21 MG/24HR
1 PATCH, TRANSDERMAL 24 HOURS TRANSDERMAL ONCE
Status: COMPLETED | OUTPATIENT
Start: 2019-10-24 | End: 2019-10-25

## 2019-10-24 RX ADMIN — HALOPERIDOL LACTATE 5 MG: 5 INJECTION, SOLUTION INTRAMUSCULAR at 13:27

## 2019-10-24 RX ADMIN — HALOPERIDOL 5 MG: 5 TABLET ORAL at 11:33

## 2019-10-24 RX ADMIN — RISPERIDONE 1 MG: 1 TABLET ORAL at 18:35

## 2019-10-24 RX ADMIN — LORAZEPAM 2 MG: 2 TABLET ORAL at 11:33

## 2019-10-24 RX ADMIN — CLONAZEPAM 0.5 MG: 0.5 TABLET ORAL at 18:35

## 2019-10-24 RX ADMIN — CLONAZEPAM 0.5 MG: 0.5 TABLET ORAL at 05:42

## 2019-10-24 RX ADMIN — DIPHENHYDRAMINE HYDROCHLORIDE 50 MG: 50 INJECTION, SOLUTION INTRAMUSCULAR; INTRAVENOUS at 13:27

## 2019-10-24 RX ADMIN — LORAZEPAM 2 MG: 2 INJECTION INTRAMUSCULAR; INTRAVENOUS at 13:27

## 2019-10-24 RX ADMIN — DIPHENHYDRAMINE HCL 50 MG: 25 TABLET ORAL at 11:33

## 2019-10-24 RX ADMIN — NICOTINE 14 MG: 14 PATCH TRANSDERMAL at 19:30

## 2019-10-24 NOTE — ED NOTES
Introduced self to pt, discussed POC, discussed planned visit to [facility] for court hearing (extend legal hold) updated on wait status, answered questions, addressed needs, Provided emotional support for pt. Communication board updated, room safety checklist completed, L2K in chart, no needs at this time Pt further explained that he has been possessed by black magic spells. Pt continually pacing and making poor eye contact. Pt very guarded, withdrawn, and paranoid.

## 2019-10-24 NOTE — ED NOTES
Pt left with security, ED tech and [facility] to [facility] for court hearing regarding legal hold extension.

## 2019-10-24 NOTE — ED NOTES
Assumed care, report received from RN, POC discussed.   Introduced self as RN, call light within reach, no s/s of distress noted.   Sitter remains at bedside.

## 2019-10-24 NOTE — ED NOTES
RN spoke with pt mom, per mom she will be here to  patient within the hour.  Pt updated on POC and conversation with mom.  Pt verbalized understanding.

## 2019-10-24 NOTE — DISCHARGE PLANNING
Received email from  Khushi regarding pt's court hearing. Was advised pt was committed by court for 6 months. Will fax court ordered commitment to LSW once they are received.     Pt awaiting transfer to psychiatric facility.

## 2019-10-24 NOTE — ED NOTES
Pt sleeping comfortably in bed, breathing is easy and unlabored. Bed in lowest position, blankets provided for comfort. No s/s of distress noted. No new needs identified, Sitter at bedside. Will continue to monitor.

## 2019-10-24 NOTE — ED NOTES
Pt pacing room, staring out door, asking for cigarette.  When RN approached, pt continues to face off closely with RN, RN steps back, pt continues to step toward.  Education provided, pt verbalized understanding and backs away, pt given anti-anxiety.  Sitter remains at bedside, RN will continue to monitor.

## 2019-10-24 NOTE — DISCHARGE PLANNING
Agency/Facility Name: [facility]  Spoke To: Krishna  Outcome: Pt is currently #1 on the list.

## 2019-10-24 NOTE — ED NOTES
Pt ambulated to BR independently, pt back resting in bed, no s/s of distress.  Sitter remains at bedside.

## 2019-10-24 NOTE — DISCHARGE PLANNING
MSW spoke to pt's mother and updated her on status. Pt to appear at [facility] court tomorrow. Pt's mother to call to get updated tomorrow morning. No other needs at this time.

## 2019-10-24 NOTE — ED NOTES
Pt increasingly agitated and anxious regarding finding out his legal hold was extended. Pt now suicidal.

## 2019-10-24 NOTE — ED NOTES
Pt pacing in room, continues to turn light on and off every couple on minutes.  Pt remains calm in room, states he is having trouble sleeping.  Calming environment provided,  Pt agreed to try to lay in bed and relax.  Sitter remains at bedside.

## 2019-10-24 NOTE — ED NOTES
Pt pacing and tense, increasingly agitated requesting medication to assist in controlling his mood. Pt demonstrating poor coping with the news that he would be transferred into an in patient psych unit. Pt was apparently under the understanding that he would be transferred to an outpatient psych treatment facility. Pt requiring frequent redirection and emotional support.

## 2019-10-24 NOTE — ED NOTES
Pt up pacing in room, asking about court date and time today.  RN informed pt [facility] will be at bedside to take the pt to [facility] at 0800 for  appearance at 0900.  SW already made arrangements.  Charge RN aware RN or CNA or[facility] will need to transfer with pt.

## 2019-10-24 NOTE — ED NOTES
"Pt awake pacing in room, asking for coffee.  Decaf provided per pt request, medications given, pt refused to take Ability due to \"family of medications\" it is in.  Pt states he doesn't need it.  Education provided on proper medication regimen and importance of continuing to take current regimen.  Pt states he is \"feeling better\" so he would rather not take it, \"when I take it I can't taste my coffee\".  Education provided, pt continues to refuse.  Sitter remains at bedside.  RN will continue to monitor.   "

## 2019-10-24 NOTE — PSYCHIATRY
"Note written by MS ABDIAS Lorenzo Cueva under my direct supervision    PSYCHIATRIC FOLLOW-UP:(established)   *Reason for admission: Psychosis         *Legal Hold Status on Admission:  6mo legal hold per court 10/24                      *HPI: Donald slept for 6-9hrs last night and said he slept well. He reports feeling an uplifted mood and says that he feels good because he \"feels like he will have freedom\" as he thinks the  said he is going to an outpatient unit. Pt says he is feeling good overall but that he is still influenced by \"black magic\" from his roommate. He sees different images in his own eyes when he looks at the glass window. Sometimes he sees himself and other times he sees foreign beings that are different shape and colors. He said the \"black magic\" is still strong but not as strong as yesterday. He refused his Abilify medication this morning as he doesn't think it will help and believes he \"needs to read the Quran extensively for 3 weeks straight and then he will become a millionaire and  his two fiances and be famous\". Also stated that \"I could be a millionaire if there was no black magic. The constant black magic is ruining my friendships and the curse is causing me to be a loner\". He says the medication \"will not help get rid of the black magic\". He says he feels like the \"black magic\" stops his memory and does not allow him to have memories. After being told that the court established a legal hold of 6mo and he will be going to an inpatient facility, Donald became very agitated and frustrated as he insisted the  said he would go to an outpatient unit and not inpatient unit. Pt reports having SI after hearing about the legal hold.               Medical ROS (as pertinent):                     Pt reports feeling dizzy, no other symptoms at this time.     *Psychiatric Examination:   Vitals:   Vitals:    10/24/19 0600   BP: 102/71   Pulse: 72   Resp: 14   Temp: 36.9 °C (98.4 °F)   SpO2: " 95%       General Appearance: Thin, poor hygiene, disheveled appearance.   Abnormal Movements: Responds to internal stimuli and abnormal smiling due to the stimuli. No other abnormal movements   Gait and Posture: Normal gait but pacing behavior.   Speech: Abnormal rate, at times with elevated rate, exaggerated, and sentences tail off.   Thought processes: Rambling, loosening of associations, responsive to internal stimuli, disorganized.   Abnormal or Psychotic Thoughts: VH and delusions   Judgement and Insight: Poor judgement and poor insight.   Orientation: Oriented place but not to time and date   Recent and Remote Memory: Not intact as could not properly remember details from court hearing earlier that day.   Attention Span and Concentration: Poor attention span and lack of concentration   Language: Normal   Fund of Knowledge: Psychosis making knowledge difficult to assess   Mood and Affect: Elevated mood that became agitated once he found out about the legal hold   SI/HI: Pt reports SI after hearing about the legal hold     Current Facility-Administered Medications   Medication Dose Route Frequency Provider Last Rate Last Dose   • risperiDONE (RISPERDAL) tablet 1 mg  1 mg Oral BID Janina Law M.D.       • clonazePAM (KLONOPIN) tablet 0.5 mg  0.5 mg Oral BID Janina Law M.D.   0.5 mg at 10/24/19 0542   • diphenhydrAMINE (BENADRYL) tablet/capsule 50 mg  50 mg Oral Q HOUR PRN Thomas Kowalski M.D.   50 mg at 10/24/19 1133    Or   • diphenhydrAMINE (BENADRYL) injection 50 mg  50 mg Intramuscular Q30 MIN PRN Thomas Kowalski M.D.   50 mg at 10/24/19 1327   • haloperidol (HALDOL) tablet 5 mg  5 mg Oral Q HOUR PRN Thomas Kowalski M.D.   5 mg at 10/24/19 1133    Or   • haloperidol lactate (HALDOL) injection 5 mg  5 mg Intramuscular Q30 MIN PRN Thomas Kowalski M.D.   5 mg at 10/24/19 1327   • LORazepam (ATIVAN) tablet 2 mg  2 mg Oral Q HOUR PRN Thomas Kowalski M.D.   2 mg at 10/24/19 1133    Or   •  LORazepam (ATIVAN) injection 2 mg  2 mg Intramuscular Q30 MIN PRN Thomas Kowalski M.D.   2 mg at 10/24/19 1327     Current Outpatient Medications   Medication Sig Dispense Refill   • QUEtiapine (SEROQUEL) 300 MG tablet Take 1 Tab by mouth every evening. 15 Tab 0   • QUEtiapine (SEROQUEL) 50 MG tablet Take 1 Tab by mouth 2 Times a Day. 30 Tab 0     Recent Results (from the past 24 hour(s))   EKG    Collection Time: 10/24/19 12:19 PM   Result Value Ref Range    Report       [facility]    Test Date:  2019-10-24  Pt Name:    LUCY ADEN                 Department: ER  MRN:        [id number]                      Room:       [facility]  Gender:     Male                         Technician: ERNA CLEAN[employer]  :        1995                   Requested By:DONITA ENNIS  Order #:    [id number]                    Reading MD:    Measurements  Intervals                                Axis  Rate:       98                           P:          81  SC:         132                          QRS:        90  QRSD:       80                           T:          67  QT:         352  QTc:        450    Interpretive Statements  SINUS RHYTHM  PROBABLE LEFT ATRIAL ABNORMALITY  BORDERLINE RIGHT AXIS DEVIATION  No previous ECG available for comparison       No orders to display            *ASSESSMENT/PLAN: Patient continues to be grossly psychotic and unable to care for himself. Court extended the hold today.     1. Schizophrenia     -Discontinue abilify and start Risperidone 1mg Po BID for psychosis.   -Continue Haldol 5 mg, Ativan 2 mg and Benadryl 50 mg as needed for agitation.   -Transfer to an acute psychiatric hospital when a bed is available.       2. Neurocognitive disorder unspecified   -History of TBI with residual memory deficits.     3. Methamphetamine use disorder       *Legal hold: Extended for 6mo per court   Other:   Sitter: needed due to elopement risk   Visitors: none at this time   Phone  calls: none at this time   Personal items (specify): books allowed         *Will Follow

## 2019-10-25 PROCEDURE — 99283 EMERGENCY DEPT VISIT LOW MDM: CPT | Mod: GC | Performed by: PSYCHIATRY & NEUROLOGY

## 2019-10-25 PROCEDURE — A9270 NON-COVERED ITEM OR SERVICE: HCPCS | Performed by: PSYCHIATRY & NEUROLOGY

## 2019-10-25 PROCEDURE — 700102 HCHG RX REV CODE 250 W/ 637 OVERRIDE(OP): Performed by: PSYCHIATRY & NEUROLOGY

## 2019-10-25 PROCEDURE — A9270 NON-COVERED ITEM OR SERVICE: HCPCS | Performed by: STUDENT IN AN ORGANIZED HEALTH CARE EDUCATION/TRAINING PROGRAM

## 2019-10-25 PROCEDURE — 700102 HCHG RX REV CODE 250 W/ 637 OVERRIDE(OP): Performed by: STUDENT IN AN ORGANIZED HEALTH CARE EDUCATION/TRAINING PROGRAM

## 2019-10-25 RX ADMIN — LORAZEPAM 2 MG: 2 TABLET ORAL at 01:44

## 2019-10-25 RX ADMIN — CLONAZEPAM 0.5 MG: 0.5 TABLET ORAL at 09:09

## 2019-10-25 RX ADMIN — RISPERIDONE 1 MG: 1 TABLET ORAL at 19:16

## 2019-10-25 RX ADMIN — CLONAZEPAM 0.5 MG: 0.5 TABLET ORAL at 19:15

## 2019-10-25 RX ADMIN — RISPERIDONE 1 MG: 1 TABLET ORAL at 09:07

## 2019-10-25 RX ADMIN — HALOPERIDOL 5 MG: 5 TABLET ORAL at 01:44

## 2019-10-25 ASSESSMENT — ENCOUNTER SYMPTOMS
CARDIOVASCULAR NEGATIVE: 1
MUSCULOSKELETAL NEGATIVE: 1
NEUROLOGICAL NEGATIVE: 1
HALLUCINATIONS: 1
CONSTITUTIONAL NEGATIVE: 1
GASTROINTESTINAL NEGATIVE: 1
EYES NEGATIVE: 1
RESPIRATORY NEGATIVE: 1
NERVOUS/ANXIOUS: 1

## 2019-10-25 NOTE — DISCHARGE PLANNING
:    [facility]ON has printed Civil Commitment Papers and given to RN to place in Pt chart.   [facility]ON has notified Brandon in admissions that Pt has been placed on Commitment by the Courts. He states Pt is currently #1 on their list.     Pt waiting for transfer to Mental Health Facility.

## 2019-10-25 NOTE — PSYCHIATRY
"PSYCHIATRIC FOLLOW UP:    Reason for admission: pt was here 2 weeks ago floridly psychotic and improved. He has cognitive problems as well. He was released from his legal hold on 10/17.  He is here for erratic behavior and not feeling \"right in the head\".                                    Reason for consult: Psychosis       Requesting Physician/APN: Dr. Cain      Supervising Psychiatrist: Dr. Law     HPI: Mr. Bennett is a 24-year-old male known to this service with a history of TBI with residual memory deficit and schizophrenia currently committed by the court for up to 6 months for inability to care for self due to psychosis.  Patient states his mood today as \"okay\" and rates his mood at 8 out of 10 with 10 being the best mood.  He states he slept 10 hours, his appetite is good and is using the bathroom regularly.  He denies any SI or HI.  He continues to report paranoid delusional thinking including \"black magic\" and feels like his old roommate or her  \"could be messing with my mind\" and states \"they use incense and do black magic Rastafari\".  He is also noted to be smiling inappropriately throughout the interview and when asked why he is smiling he declines to state.  He states that he feels like the black magic is \"not being used as much on me anymore\".  He has been adherent to his Risperdal 1 mg twice daily and Klonopin 0.5 mg twice daily.  He denies any adverse side effects from his medications and denies any physical ailments.  He required a PRN of Haldol 5 mg, Ativan 2 mg and Benadryl 50 mg IM for agitation yesterday after being told he was committed by the court.  He reports he now feels better and is willing to accept that he will require inpatient psychiatric hospitalization for the foreseeable future.       Psychiatric Examination: observed phenomenon:  Vitals: /73   Pulse 75   Temp 36.9 °C (98.4 °F) (Temporal)   Resp 16   Ht 1.93 m (6' 4\")   Wt 86.2 kg (190 lb)   SpO2 95%   BMI " "23.13 kg/m²  Body mass index is 23.13 kg/m².  Review of Systems:  Psychiatric:  Depression: Negative. Denies depressed mood, sleep disturbances, anhedonia, feelings of guilt, low energy, poor concentration, poor appetite, psychomotor disturbances and suicidality.  Anxiety: Positive.  Reports excessive anxiety more days than not and associated symptoms such as difficulty concentrating, sleep disturbance and irritability.  Psychosis: Positive. Denies AVH, positive for disorganized thinking and speech.  Ly/Bipolar: Negative. Denies periods of decreased need for sleep, distractability, impulsivity, dangerous activities, flight of ideas.  PTSD: Negative. Denies past trauma or associated symptoms such as nightmares, flashbacks, hypervigilance, avoidance of situations.  Review of Systems   Constitutional: Negative.    HENT: Negative.    Eyes: Negative.    Respiratory: Negative.    Cardiovascular: Negative.    Gastrointestinal: Negative.    Genitourinary: Negative.    Musculoskeletal: Negative.    Skin: Negative.    Neurological: Negative.    Endo/Heme/Allergies: Negative.    Psychiatric/Behavioral: Positive for hallucinations. The patient is nervous/anxious.       Psychiatric Examination: observed phenomenon:  Vitals: /73   Pulse 75   Temp 36.9 °C (98.4 °F) (Temporal)   Resp 16   Ht 1.93 m (6' 4\")   Wt 86.2 kg (190 lb)   SpO2 95%   BMI 23.13 kg/m²  Body mass index is 23.13 kg/m².  General Appearance: 24 y.o.male pacing in room in NAD. Moderately kempt and clean.  Behavior: Cooperative and engaged with interview.  Appropriate eye contact.  No aggressive behaviors.  Responding to internal stimuli and looking at his reflection throughout the interview.  Abnormal Movements: No abnormal movements, muscle spasms or choreiform movements.  Gait and Posture: Normal gait and posture.  Speech: Normal volume.  Normal tone.  Regular rate and rhythm.  Thought Process: Disorganized occasionally, linear mostly and much " "improved from days prior.  Some thought blocking.  Associations: None  Abnormal or Psychotic Thoughts: Denies AVH, positive for disorganized thinking and delusional thinking as noted above.  Judgement and Insight: Poor/poor  Orientation: Alert and oriented x4  Recent and Remote Memory: Intact  Attention Span and Concentration: Intact  Language: English  Fund of Knowledge: Appropriate  Mood:\"OK\" as stated by patient.  Affect: Euthymic, smiles inappropriately as noted above, congruent with stated mood.  SI/HI: Denies SI and HI.  MMSE score and/or clock drawing: Declines    Soc history:  Pt reports he would like to speak his mom the telephone later.    Lab results/tests:   Recent Results (from the past 48 hour(s))   EKG    Collection Time: 10/24/19 12:19 PM   Result Value Ref Range    Report       [facility]    Test Date:  2019-10-24  Pt Name:    LUCY ADEN                 Department: ER  MRN:        [id number]                      Room:       [facility]  Gender:     Male                         Technician: ERNA CLEANING  :        1995                   Requested By:DONITA ENNIS  Order #:    [id number]                    Reading MD:    Measurements  Intervals                                Axis  Rate:       98                           P:          81  RI:         132                          QRS:        90  QRSD:       80                           T:          67  QT:         352  QTc:        450    Interpretive Statements  SINUS RHYTHM  PROBABLE LEFT ATRIAL ABNORMALITY  BORDERLINE RIGHT AXIS DEVIATION  No previous ECG available for comparison       No orders to display            Assessment: Mr. Bennett is a 24-year-old male known to this service with a history of TBI with residual memory deficit and schizophrenia currently committed by the court for up to 6 months for inability to care for self due to psychosis.  He continues to have paranoid delusions although it appears less " than previous days.  He has been adherent to his Risperdal and Klonopin and this seems to be improving his psychosis and anxiety.  He is unable to state a safe plan for discharge and will require further hospitalization for inability to care for self.    1.  Schizophrenia  -Continue Risperdal 1 mg PO BID  -Continue Klonopin 0.5 mg p.o. twice daily for anxiety.  -Continue Haldol 5 mg, Ativan 2 mg and Benadryl 50 mg as needed for agitation.  -Transfer to an acute psychiatric hospital when a bed is available.     2.  Neurocognitive disorder unspecified  -History of TBI with residual memory deficits.     3.  Methamphetamine use disorder  -Refer for substance use counseling.            Plan:  legal hold:Commited by court up to 6 months.

## 2019-10-25 NOTE — ED NOTES
Pt sitting upright on gurney, consuming lunch tray.  Sitter outside of room with direct view of Pt.

## 2019-10-25 NOTE — ED NOTES
Pt pacing, requiring frequent redirection, intently staring at staff and PSA's. Pt appears very tense and restless.

## 2019-10-25 NOTE — ED NOTES
Pt sitting upright on gurney, consuming breakfast tray.  Sitter outside of room with direct view of Pt.

## 2019-10-25 NOTE — DISCHARGE PLANNING
ALERT team  note:  24 year old male admitted 10/20/19, legal hold, inability to care for self; cont with responding to internal stimuli; decreased insight and judgement; pt seen in court today, pt with 6 month court committment; received Klonopin 0/5 mg PO and Risperdal 1 mg PO BID; received Haldol 5 mg IM with Benadryl 50 mg IM today at 1327 for agitation; [facility]

## 2019-10-25 NOTE — ED NOTES
Pt requests multiple trips to restroom, pacing in room.  Pt reporting anxiety, restlessness. Pt medicated per MAR.

## 2019-10-25 NOTE — ED PROVIDER NOTES
ED Provider Note    Patient reevaluated.  Patient is on a legal hold, waiting transfer to psychiatric facility when a bed is available.  Please refer to initial note for complete details.  No concerns overnight.  Patient ambulates to the bathroom independently and tolerated a meal.  Medication reconciliation has been reviewed.

## 2019-10-25 NOTE — ED NOTES
No change in pt status, resting in bed, NAD. Respirations even & unlabored. Sitter at doorway for close observation.

## 2019-10-25 NOTE — ED NOTES
Patient resting in bed, sleeping, no signs of pain or distress, unlabored breathing noted, bed in low position, sitter at doorway in line of sight for close observation.

## 2019-10-26 PROCEDURE — A9270 NON-COVERED ITEM OR SERVICE: HCPCS | Performed by: PSYCHIATRY & NEUROLOGY

## 2019-10-26 PROCEDURE — A9270 NON-COVERED ITEM OR SERVICE: HCPCS | Performed by: STUDENT IN AN ORGANIZED HEALTH CARE EDUCATION/TRAINING PROGRAM

## 2019-10-26 PROCEDURE — 99282 EMERGENCY DEPT VISIT SF MDM: CPT | Performed by: PSYCHIATRY & NEUROLOGY

## 2019-10-26 PROCEDURE — 700102 HCHG RX REV CODE 250 W/ 637 OVERRIDE(OP): Performed by: PSYCHIATRY & NEUROLOGY

## 2019-10-26 PROCEDURE — 700102 HCHG RX REV CODE 250 W/ 637 OVERRIDE(OP): Performed by: STUDENT IN AN ORGANIZED HEALTH CARE EDUCATION/TRAINING PROGRAM

## 2019-10-26 RX ORDER — RISPERIDONE 2 MG/1
2 TABLET ORAL 2 TIMES DAILY
Status: DISCONTINUED | OUTPATIENT
Start: 2019-10-26 | End: 2019-10-30 | Stop reason: HOSPADM

## 2019-10-26 RX ADMIN — RISPERIDONE 2 MG: 2 TABLET ORAL at 17:57

## 2019-10-26 RX ADMIN — RISPERIDONE 1 MG: 1 TABLET ORAL at 06:16

## 2019-10-26 RX ADMIN — LORAZEPAM 2 MG: 2 TABLET ORAL at 21:57

## 2019-10-26 RX ADMIN — LORAZEPAM 2 MG: 2 TABLET ORAL at 14:11

## 2019-10-26 RX ADMIN — CLONAZEPAM 0.5 MG: 0.5 TABLET ORAL at 06:16

## 2019-10-26 RX ADMIN — HALOPERIDOL 5 MG: 5 TABLET ORAL at 20:38

## 2019-10-26 RX ADMIN — CLONAZEPAM 0.5 MG: 0.5 TABLET ORAL at 17:57

## 2019-10-26 RX ADMIN — DIPHENHYDRAMINE HCL 50 MG: 25 TABLET ORAL at 21:57

## 2019-10-26 NOTE — DISCHARGE PLANNING
"Alert Team  Pt calm and cooperative.  Hx TBI and schizophrenia.  Required redirection in the bathroom.  Sitters noted he was in there \"a long time and just playing with the water.\"  Easily redirected to leave the bathroom if he was done.  Pt continues to await inpt psych hospitalization at [facility].    He is now on a 6 month commitment per legal hold court.    "

## 2019-10-26 NOTE — ED NOTES
Patient resting quietly in bed with eyes closed without distress, no apparent needs at this time.  Good chest rise and fall.

## 2019-10-26 NOTE — PSYCHIATRY
"PSYCHIATRIC FOLLOW-UP:(established)  *Reason for admission: pt was here 2 weeks ago floridly psychotic and improved. He has cognitive problems as well. He was released from his legal hold on 10/17.  He is here for erratic behavior and not feeling \"right in the head\".                        *Legal Hold Status on Admission:   +              *HPI:   Smiling to self. \"Optimistic about the future\" because \"its open\". Slept, no side effects.          *Psychiatric Examination:  Vitals: Blood pressure 111/69, pulse 90, temperature 36.6 °C (97.9 °F), temperature source Temporal, resp. rate 16, height 1.93 m (6' 4\"), weight 86.2 kg (190 lb), SpO2 95 %.  General Appearance: good eye contact  Abnormal Movements: paces  Gait and Posture: none  Speech: wnl  Thought processes: normal rate  Associations: seemingly linear  Abnormal or Psychotic Thoughts: delusional  Judgement and Insight: poor  Orientation: grossly intact  Recent and Remote Memory:grossly intact   Attention Span and Concentration: intact  Language: fluid  Fund of Knowledge:not tested  Mood and Affect: \"optimistic\"  SI/HI:denies      *ASSESSMENT/PLAN:  1. Schizophrenia:   -pt was released by the court form his legal hold on 10/17.   - pt back on risperdol: will be inceased         2.Neurocognitive disorder unspc  -hx of TBI with residual memory deficits.     3. Methamphetamine use  -\"I use 6 times a year\"     4.Medical  -non acutely            *Legal hold: committed by the court for 6 months.  and perhaps the legal department to get him transferred if possible.            *Will Follow      "

## 2019-10-26 NOTE — ED NOTES
Assist RN: Patient is resting quietly, even and non-labored respirations noted. Sitter remains with patient within direct view.

## 2019-10-26 NOTE — ED NOTES
Pt medicated per MAR. Denies needs or questions at this time. VSS. Patient is resting quietly, even and non-labored respirations noted.

## 2019-10-26 NOTE — ED PROVIDER NOTES
ED Provider Note    Patient is evaluated.  He does not have any complaints.  He is being followed by psychiatry.  We will proceed with plan.

## 2019-10-27 PROCEDURE — 700102 HCHG RX REV CODE 250 W/ 637 OVERRIDE(OP): Performed by: PSYCHIATRY & NEUROLOGY

## 2019-10-27 PROCEDURE — A9270 NON-COVERED ITEM OR SERVICE: HCPCS | Performed by: PSYCHIATRY & NEUROLOGY

## 2019-10-27 PROCEDURE — 700102 HCHG RX REV CODE 250 W/ 637 OVERRIDE(OP): Performed by: STUDENT IN AN ORGANIZED HEALTH CARE EDUCATION/TRAINING PROGRAM

## 2019-10-27 PROCEDURE — A9270 NON-COVERED ITEM OR SERVICE: HCPCS | Performed by: STUDENT IN AN ORGANIZED HEALTH CARE EDUCATION/TRAINING PROGRAM

## 2019-10-27 PROCEDURE — 700102 HCHG RX REV CODE 250 W/ 637 OVERRIDE(OP): Performed by: EMERGENCY MEDICINE

## 2019-10-27 PROCEDURE — 99282 EMERGENCY DEPT VISIT SF MDM: CPT | Performed by: PSYCHIATRY & NEUROLOGY

## 2019-10-27 PROCEDURE — A9270 NON-COVERED ITEM OR SERVICE: HCPCS | Performed by: EMERGENCY MEDICINE

## 2019-10-27 RX ORDER — NICOTINE 21 MG/24HR
1 PATCH, TRANSDERMAL 24 HOURS TRANSDERMAL ONCE
Status: COMPLETED | OUTPATIENT
Start: 2019-10-27 | End: 2019-10-28

## 2019-10-27 RX ORDER — LORAZEPAM 2 MG/ML
INJECTION INTRAMUSCULAR
Status: COMPLETED
Start: 2019-10-27 | End: 2019-10-27

## 2019-10-27 RX ADMIN — NICOTINE 1 PATCH: 21 PATCH TRANSDERMAL at 18:04

## 2019-10-27 RX ADMIN — LORAZEPAM 2 MG: 2 TABLET ORAL at 22:37

## 2019-10-27 RX ADMIN — CLONAZEPAM 0.5 MG: 0.5 TABLET ORAL at 06:07

## 2019-10-27 RX ADMIN — DIPHENHYDRAMINE HCL 50 MG: 25 TABLET ORAL at 21:33

## 2019-10-27 RX ADMIN — RISPERIDONE 2 MG: 2 TABLET ORAL at 17:41

## 2019-10-27 RX ADMIN — LORAZEPAM 2 MG: 2 TABLET ORAL at 12:55

## 2019-10-27 RX ADMIN — HALOPERIDOL 5 MG: 5 TABLET ORAL at 21:33

## 2019-10-27 RX ADMIN — RISPERIDONE 2 MG: 2 TABLET ORAL at 06:07

## 2019-10-27 RX ADMIN — CLONAZEPAM 0.5 MG: 0.5 TABLET ORAL at 17:41

## 2019-10-27 NOTE — DISCHARGE PLANNING
Awake and alert. States he is aware that his body mind and spirit on not very clear.  States he is wanting to read some of his Croatian spiritual material because it is comforting to him. States he believes it is unfair that he has to stay here and would like to take his mother with him to court. Continue to wait for placement to [facility] for commitment.

## 2019-10-27 NOTE — ED NOTES
Patient walked to and from [facility] with stand by  and [employer] tech. Patient was calm and cooperative at this time. Clean linens, gown, underwear and blue non-skid socks given

## 2019-10-27 NOTE — PSYCHIATRY
"PSYCHIATRIC FOLLOW-UP:(established)  *Reason for admission:   pt was here 2 weeks ago floridly psychotic and improved. He has cognitive problems as well. He was released from his legal hold on 10/17.  He is here for erratic behavior and not feeling \"right in the head\".          *Legal Hold Status on Admission:   +                     *HPI: slightly somnolent from increase in risperdol. Discussed a monthly injection of same with pt. Refused.  No overt psychosis today.            *Psychiatric Examination:  Vitals: Blood pressure 110/68, pulse 68, temperature 36.9 °C (98.4 °F), temperature source Temporal, resp. rate 16, height 1.93 m (6' 4\"), weight 86.2 kg (190 lb), SpO2 98 %.  General Appearance: good eye contact  Abnormal Movements: sitting and less pacing  Gait and Posture: none  Speech: wnl  Thought processes: normal rate  Associations: seemingly linear  Abnormal or Psychotic Thoughts:none overtly  Judgement and Insight: poor  Orientation: grossly intact  Recent and Remote Memory:grossly intact   Attention Span and Concentration: intact  Language: fluid  Fund of Knowledge:not tested  Mood and Affect: \"okay\", smiles  SI/HI:denies      *ASSESSMENT/PLAN:  Schizophrenia:    - risperdol 2 mg bid         2.Neurocognitive disorder unspc  -hx of TBI with residual memory deficits.     3. Methamphetamine use  -\"I use 6 times a year\"     4.Medical  -non acutely          *Legal hold: committed by the court. Will probably stay here since this order has been ignored before.               *Will Follow      "

## 2019-10-27 NOTE — ED PROVIDER NOTES
ED PROVIDER NOTE    Scribed for Marco Davenport M.D. by Mackenzie Reynolds. 10/27/2019, 12:22 PM.    This is an addendum to the note on Donald Masters. For further details and full chart entry, see the previously signed ED Provider Note written by Dr. Cain ([facility]).      6:18 AM - transfer care of the patient to me at this time.        12:45 PM - Patient is resting comfortable. No further intervention needed at this time    FINAL IMPRESSION   1. Agitation         Mackenzie BERNABE (Scribe), am scribing for, and in the presence of, Marco Davenport M.D..    Electronically signed by: Mackenzie Reynolds ([place]), 10/27/2019    Marco BERNABE M.D. personally performed the services described in this documentation, as scribed by Mackenzie Reynolds in my presence, and it is both accurate and complete. E    The note accurately reflects work and decisions made by me.  Marco Davenport  10/27/2019  1:44 PM

## 2019-10-27 NOTE — ED NOTES
Received report from HERMILA Valentin. Patient resting in [facility] at this time. Sitter at the door.

## 2019-10-28 PROCEDURE — 700102 HCHG RX REV CODE 250 W/ 637 OVERRIDE(OP): Performed by: PSYCHIATRY & NEUROLOGY

## 2019-10-28 PROCEDURE — A9270 NON-COVERED ITEM OR SERVICE: HCPCS | Performed by: PSYCHIATRY & NEUROLOGY

## 2019-10-28 PROCEDURE — 99282 EMERGENCY DEPT VISIT SF MDM: CPT | Performed by: PSYCHIATRY & NEUROLOGY

## 2019-10-28 RX ADMIN — RISPERIDONE 2 MG: 2 TABLET ORAL at 06:20

## 2019-10-28 RX ADMIN — CLONAZEPAM 0.5 MG: 0.5 TABLET ORAL at 06:20

## 2019-10-28 RX ADMIN — CLONAZEPAM 0.5 MG: 0.5 TABLET ORAL at 19:15

## 2019-10-28 RX ADMIN — RISPERIDONE 2 MG: 2 TABLET ORAL at 19:15

## 2019-10-28 NOTE — DISCHARGE PLANNING
"Alert Team  Provided pt with packet of educational materials on schizophrenia, TBI, addiction, mindfulness and breathing exercises.  Pt expressed gratitude and desire to transfer \"right now.\"  Pt easily redirected that arrangements have not yet been made, but we are hoping it will be today or tomorrow.  "

## 2019-10-28 NOTE — ED NOTES
Patient asking to take a walk. Patient displaying appropriate behavior, PSA walking patient around nurses station.

## 2019-10-28 NOTE — ED NOTES
Pt given telephone to call sister.  Pt had asked if he could call [facility]. It was explained that would be counter productive.  No acute change in condition since last entry. Safety maintained.  Pt remains on one on one with sitter in line of sight.

## 2019-10-28 NOTE — ED NOTES
No acute change in condition since last entry. Safety maintained.  Will continue to monitor.  Pt remains on watch with sitter in line of sight.

## 2019-10-28 NOTE — ED NOTES
No acute change in condition since last entry.  Safety maintained. Will continue to monitor.  Pt remains on one on one with sitter in line of sight.

## 2019-10-28 NOTE — PSYCHIATRY
"PSYCHIATRIC FOLLOW-UP:(established)  *Reason for admission:    pt was here 2 weeks ago floridly psychotic and improved. He has cognitive problems as well. He was released from his legal hold on 10/17.  He is here for erratic behavior and not feeling \"right in the head\".          *Legal Hold Status on Admission:   Committed by court                     *HPI:  Asleep. Chart reviewed. Taking meds.          Medical ROS (as pertinent):                        *Psychiatric Examination:  Vitals: Blood pressure 103/63, pulse 76, temperature 36.7 °C (98.1 °F), temperature source Temporal, resp. rate 16, height 1.93 m (6' 4\"), weight 86.2 kg (190 lb), SpO2 100 %.  General Appearance: asleep  Abnormal Movements: none  Gait and Posture: none  Speech: asleep   processes: unable to assess  Associations: unable to assess  Abnormal or Psychotic Thoughts:unable to assess  Judgement and Insight: unable to assess  Orientation: unable to assess  Recent and Remote Memory:unable to assess  Attention Span and Concentration:unable to assess  Language: unable to assess  Fund of Knowledge:unable to assess  Mood and Affect: unable to assess  SI/HI:unable to assess      *ASSESSMENT/PLAN:  Schizophrenia:    - risperdol 2 mg bid: he is compliant with it.  -refused long acting         2.Neurocognitive disorder unspc  -hx of TBI with residual memory deficits.     3. Methamphetamine use  -\"I use 6 times a year\"     4.Medical  -non acutely                     *Legal hold: committed by the court. Will probably stay here since this order has been ignored before.               *Will Follow         "

## 2019-10-28 NOTE — ED PROVIDER NOTES
ED Provider Note    Date and Time Temp Pulse Heart Rate (Monitored) Resp BP NIBP SpO2 Room air O2 Patient BP Position BP Location O2 (LPM) O2 Delivery 5 User   10/28/19 0619 36.7 °C (98.1 °F) 76 -- 16 103/63 -- 100 % 100 Supine Left;Upper Arm -- -- Moderate/Dozing Intermittently KES   10/28/19 0148 36.6 °C (97.9 °F) 66 -- 16 109/64 -- 98 % 98 Supine Left;Upper Arm 0 None (Room Air) Sleeping/Normal Sleep KRY   10/27/19 1729 36.8 °C (98.2 °F)                  Patient has been stable and here for several days.  Vital signs of been stable and normal.  He has been eating and drinking and has not required chemical or physical restraints.  We are still waiting acceptance at a psychiatric facility

## 2019-10-28 NOTE — ED NOTES
Report recd and care assumed.  Pt is on one on one watch with sitter in line of sight.  Resting in bed in no obvious distress.  Safety maintained.  Will continue to monitor.

## 2019-10-28 NOTE — ED NOTES
"Patient vital signs rechecked and documented per [facility]. Patient denies any new needs at this time. Patient is up to date on poc    Family members x2 (mother and sister) are at bedside, rn okay  Sitter outside doorway   patient stated \"when I squeeze and put pressure on the back of my neck, I feel like I can think clearly and respond faster. When I let go of the back of my neck. Everything states to feel fuzzy and become slow again\".   "

## 2019-10-28 NOTE — ED NOTES
Patient walking around unit with PSA. Patient requesting meds for restlessness/agitation. Medicated per MAR.

## 2019-10-28 NOTE — ED NOTES
Report received, assumed care of patient. Patient resting in gurney, chest rising and falling. PSA at bedside for 1:1 observation

## 2019-10-28 NOTE — ED NOTES
Sister and mother has come to bedside to interact with pt.  No acute change in condition since last entry.  Pt remains on watch with sitter in line of sight.

## 2019-10-29 PROCEDURE — 700102 HCHG RX REV CODE 250 W/ 637 OVERRIDE(OP): Performed by: PSYCHIATRY & NEUROLOGY

## 2019-10-29 PROCEDURE — A9270 NON-COVERED ITEM OR SERVICE: HCPCS | Performed by: PSYCHIATRY & NEUROLOGY

## 2019-10-29 PROCEDURE — 99283 EMERGENCY DEPT VISIT LOW MDM: CPT | Performed by: PSYCHIATRY & NEUROLOGY

## 2019-10-29 PROCEDURE — 700102 HCHG RX REV CODE 250 W/ 637 OVERRIDE(OP): Performed by: EMERGENCY MEDICINE

## 2019-10-29 PROCEDURE — 700102 HCHG RX REV CODE 250 W/ 637 OVERRIDE(OP): Performed by: STUDENT IN AN ORGANIZED HEALTH CARE EDUCATION/TRAINING PROGRAM

## 2019-10-29 PROCEDURE — A9270 NON-COVERED ITEM OR SERVICE: HCPCS | Performed by: EMERGENCY MEDICINE

## 2019-10-29 PROCEDURE — A9270 NON-COVERED ITEM OR SERVICE: HCPCS | Performed by: STUDENT IN AN ORGANIZED HEALTH CARE EDUCATION/TRAINING PROGRAM

## 2019-10-29 RX ORDER — NICOTINE 21 MG/24HR
1 PATCH, TRANSDERMAL 24 HOURS TRANSDERMAL ONCE
Status: COMPLETED | OUTPATIENT
Start: 2019-10-29 | End: 2019-10-30

## 2019-10-29 RX ORDER — ONDANSETRON 4 MG/1
4 TABLET, ORALLY DISINTEGRATING ORAL ONCE
Status: COMPLETED | OUTPATIENT
Start: 2019-10-30 | End: 2019-10-30

## 2019-10-29 RX ORDER — LORAZEPAM 1 MG/1
1 TABLET ORAL ONCE
Status: DISCONTINUED | OUTPATIENT
Start: 2019-10-29 | End: 2019-10-30 | Stop reason: HOSPADM

## 2019-10-29 RX ADMIN — CLONAZEPAM 0.5 MG: 0.5 TABLET ORAL at 06:18

## 2019-10-29 RX ADMIN — HALOPERIDOL 5 MG: 5 TABLET ORAL at 16:04

## 2019-10-29 RX ADMIN — CLONAZEPAM 0.5 MG: 0.5 TABLET ORAL at 17:31

## 2019-10-29 RX ADMIN — LORAZEPAM 2 MG: 2 TABLET ORAL at 16:03

## 2019-10-29 RX ADMIN — LORAZEPAM 2 MG: 2 TABLET ORAL at 10:53

## 2019-10-29 RX ADMIN — DIPHENHYDRAMINE HCL 50 MG: 25 TABLET ORAL at 16:03

## 2019-10-29 RX ADMIN — RISPERIDONE 2 MG: 2 TABLET ORAL at 17:31

## 2019-10-29 RX ADMIN — RISPERIDONE 2 MG: 2 TABLET ORAL at 06:18

## 2019-10-29 RX ADMIN — NICOTINE TRANSDERMAL SYSTEM 1 PATCH: 21 PATCH, EXTENDED RELEASE TRANSDERMAL at 17:29

## 2019-10-29 NOTE — PSYCHIATRY
"PSYCHIATRIC FOLLOW-UP:(established)  PSYCHIATRIC FOLLOW-UP:(established)   *Reason for admission: Psychosis         *Legal Hold Status on Admission:  6mo legal hold per court 10/24                *HPI:   Pt was preoccupied with wanting to be discharged home, stating that he wants to continue treatment in the outpatient clinic, go to school and find a job. \"I could be more productive than being here\". Pt reported sleeping well at night, having good appetite and energy. He feels bored, and does not like to watch TV. He has been reading the [employer]. Pt denied any SI/HI. Denied any AVH today. Last night saw his refection and felt he was changing, but it has been happening less. Also stated taht the black magic has lessened.    Denied any SE to his medications.       Medical ROS (as pertinent):  Denies any HA, visual changes, tremors, CP/SOB, abdominal pain, no N/V, no diarrhea, constipation, denies rash, joint pain or muscle pain.                       *Psychiatric Examination:  Vitals:   Vitals:    10/29/19 0300   BP: 118/70   Pulse: 68   Resp: 18   Temp:    SpO2: 98%       General Appearance: tall man, fair grooming and fair hygiene, appears sleepy, calm, cooperative, good eye contact  Abnormal Movements: pacing less  Gait and Posture: normal  Speech: normal volume, tone and rhythm   Thought processes:linear  Associations: no loose associations   Abnormal or Psychotic Thoughts: denies AVH, no paranoia or delusions elicited, but endorsed psychosis yesterday (lessening)   Judgement and Insight: limited/poor  Orientation: grossly oriented   Recent and Remote Memory:   Attention Span and Concentration: intact  Language: fluid   Fund of Knowledge:not tested  Mood and Affect:\"very depressed\", flat  SI/HI:denies SI/HI   Current Facility-Administered Medications   Medication Dose Route Frequency Provider Last Rate Last Dose   • risperiDONE (RISPERDAL) tablet 2 mg  2 mg Oral BID Antonia Cervantes M.D.   2 mg at 10/29/19 0618 "   • clonazePAM (KLONOPIN) tablet 0.5 mg  0.5 mg Oral BID Janina Law M.D.   0.5 mg at 10/29/19 0618   • diphenhydrAMINE (BENADRYL) tablet/capsule 50 mg  50 mg Oral Q HOUR PRN Thomas Kowalski M.D.   50 mg at 10/27/19 2133    Or   • diphenhydrAMINE (BENADRYL) injection 50 mg  50 mg Intramuscular Q30 MIN PRN Thomas Kowalski M.D.   50 mg at 10/24/19 1327   • haloperidol (HALDOL) tablet 5 mg  5 mg Oral Q HOUR PRN Thomas Kowalski M.D.   5 mg at 10/27/19 2133    Or   • haloperidol lactate (HALDOL) injection 5 mg  5 mg Intramuscular Q30 MIN PRN Thomas Kowalski M.D.   5 mg at 10/24/19 1327   • LORazepam (ATIVAN) tablet 2 mg  2 mg Oral Q HOUR PRN Thomas Kowalski M.D.   2 mg at 10/29/19 1053    Or   • LORazepam (ATIVAN) injection 2 mg  2 mg Intramuscular Q30 MIN PRN Thomas Kowalski M.D.   2 mg at 10/24/19 1327     Current Outpatient Medications   Medication Sig Dispense Refill   • QUEtiapine (SEROQUEL) 300 MG tablet Take 1 Tab by mouth every evening. 15 Tab 0   • QUEtiapine (SEROQUEL) 50 MG tablet Take 1 Tab by mouth 2 Times a Day. 30 Tab 0     No results found for this or any previous visit (from the past 24 hour(s)).     No orders to display     *ASSESSMENT/PLAN:  Schizophrenia:  psychosis appears to have lessened. Improving.  - Continue  risperdol 2 mg bid: he has been compliant with it.  -refusing long acting         2.Neurocognitive disorder unspc  -hx of TBI with residual memory deficits.     3. Methamphetamine use     4.Medical  -non acutely          *Legal hold: committed by the court.      *Will Follow

## 2019-10-29 NOTE — ED NOTES
"Pt becoming aggitated and pacing in door way states there is nothing he can do to stay calm anymore. \"i've been here too long\" pt asking for something to help him stay calm. Will medicated per orders  "

## 2019-10-29 NOTE — ED NOTES
"Pt standing in doorway agitated again now that family has left. Pt states that he \"needs something to help me stay calm, I can't do this anymore\" worried pt may escalate his behavior and become more agitated. Will medicate as ordered  "

## 2019-10-29 NOTE — ED NOTES
I walked with pt around nursing station along with security to help stretch pt legs and get some activity. Pt was calm and cooperative

## 2019-10-29 NOTE — ED PROVIDER NOTES
ED Provider Note Addendum    Scribed for Ney Beaulieu M.D. by Khushi Yoo on 10/29/2019 at 8:39 AM.     This is an addendum to the note on Donald Masters.  For further details and full chart information, see patient's initial note.       8:39 AM - I discussed the patient's case with Dr. Rodriguez ([facility]) who will transfer care of the patient to me at this time.        Patient signed out to Dr. Alfaro.    Disposition:  Patient will be transferred to an appropriate psychiatric facility in stable condition for further psychiatric care and evaluation.  Patient will be placed under the care of my partner awaiting transfer.    FINAL IMPRESSION  1. Agitation         I, Khushi Yoo (Scribe), am scribing for, and in the presence of, Ney Beaulieu M.D..    Electronically signed by: Khushi Yoo (Rhonda[place]), 10/29/2019    INey M.D. personally performed the services described in this documentation, as scribed by Khushi Yoo in my presence, and it is both accurate and complete.    The note accurately reflects work and decisions made by me.  Ney Beaulieu  10/29/2019  4:54 PM

## 2019-10-30 VITALS
HEIGHT: 76 IN | HEART RATE: 93 BPM | TEMPERATURE: 98.1 F | OXYGEN SATURATION: 96 % | BODY MASS INDEX: 23.14 KG/M2 | SYSTOLIC BLOOD PRESSURE: 110 MMHG | WEIGHT: 190 LBS | DIASTOLIC BLOOD PRESSURE: 74 MMHG | RESPIRATION RATE: 16 BRPM

## 2019-10-30 PROCEDURE — 700111 HCHG RX REV CODE 636 W/ 250 OVERRIDE (IP): Performed by: EMERGENCY MEDICINE

## 2019-10-30 PROCEDURE — A9270 NON-COVERED ITEM OR SERVICE: HCPCS | Performed by: STUDENT IN AN ORGANIZED HEALTH CARE EDUCATION/TRAINING PROGRAM

## 2019-10-30 PROCEDURE — 700102 HCHG RX REV CODE 250 W/ 637 OVERRIDE(OP): Performed by: STUDENT IN AN ORGANIZED HEALTH CARE EDUCATION/TRAINING PROGRAM

## 2019-10-30 PROCEDURE — 99282 EMERGENCY DEPT VISIT SF MDM: CPT | Performed by: PSYCHIATRY & NEUROLOGY

## 2019-10-30 PROCEDURE — A9270 NON-COVERED ITEM OR SERVICE: HCPCS | Performed by: PSYCHIATRY & NEUROLOGY

## 2019-10-30 PROCEDURE — 700102 HCHG RX REV CODE 250 W/ 637 OVERRIDE(OP): Performed by: PSYCHIATRY & NEUROLOGY

## 2019-10-30 RX ADMIN — ONDANSETRON 4 MG: 4 TABLET, ORALLY DISINTEGRATING ORAL at 00:03

## 2019-10-30 RX ADMIN — LORAZEPAM 2 MG: 2 TABLET ORAL at 12:20

## 2019-10-30 RX ADMIN — CLONAZEPAM 0.5 MG: 0.5 TABLET ORAL at 06:20

## 2019-10-30 RX ADMIN — RISPERIDONE 2 MG: 2 TABLET ORAL at 06:20

## 2019-10-30 NOTE — ED NOTES
Patient presents walking around in his room, no obvious distress.  Patient is alert and oriented, responds with flat affect.  Initially requested phone to call family, however changed his mind after food tray arrived.  Now sitting in bed eating.    Sitter Trupti outside of room.

## 2019-10-30 NOTE — DISCHARGE PLANNING
Medical Social Work    LSW received call from [facility] stating the pt has been accepted by Dr. Davila. PCS form and Facesheet faxed to [facility]. Transportation arranged w/ Jackie @ [facility] for 1300. LSW completed transfer packet and placed original LH in packet and placed on pt's chart. Bedside RN and [facility] notified of 1300 transport time.

## 2019-10-30 NOTE — PSYCHIATRY
"PSYCHIATRIC FOLLOW-UP:(established)  *Reason for admission: Psychosis         *Legal Hold Status on Admission:  6mo legal hold per court 10/24                *HPI:  Patient reports that he slept well last night and feeling ok today. He was watching a vampire movie on TV during evaluation and stated he was enjoying it. Pt denied any AVH for the past 2 days. No paranoia or delusions elicited. States he feels the black maigc is almost gone, and that reciting verses from the [employer] helped with the voices. He feels lethargic today and that his thoughts are slowing down.           Medical ROS (as pertinent): Denies any HA, visual changes, tremors, CP/SOB, abdominal pain, no N/V, no diarrhea, constipation, denies rash, joint pain or muscle pain.                                        *Psychiatric Examination:  Vitals:   Vitals:    10/30/19 1200   BP: 110/74   Pulse: 93   Resp: 16   Temp: 36.7 °C (98.1 °F)   SpO2: 96%       General Appearance: tall man, fair grooming and fair hygiene, watching TV sitting on a chair in his room, calm, cooperative, good eye contact  Abnormal Movements: no psychomotor retardation or agitation   Gait and Posture: normal  Speech: normal volume, tone and rhythm   Thought processes:linear  Associations: no loose associations   Abnormal or Psychotic Thoughts: denies AVH, no paranoia or delusions elicited  Judgement and Insight: limited/poor  Orientation: grossly oriented   Recent and Remote Memory: intact  Attention Span and Concentration: intact  Language: fluid   Fund of Knowledge:not tested  Mood and Affect:\"OK\", flat  SI/HI:denies SI/HI    Current Facility-Administered Medications   Medication Dose Route Frequency Provider Last Rate Last Dose   • LORazepam (ATIVAN) tablet 1 mg  1 mg Oral Once Krihsna Alfaro D.O.   Stopped at 10/29/19 2100   • risperiDONE (RISPERDAL) tablet 2 mg  2 mg Oral BID Antonia Cervantes M.D.   2 mg at 10/30/19 0620   • clonazePAM (KLONOPIN) tablet 0.5 mg  0.5 mg " Oral BID Janina Law M.D.   0.5 mg at 10/30/19 0620   • diphenhydrAMINE (BENADRYL) tablet/capsule 50 mg  50 mg Oral Q HOUR PRN Thomas Kowalski M.D.   50 mg at 10/29/19 1603    Or   • diphenhydrAMINE (BENADRYL) injection 50 mg  50 mg Intramuscular Q30 MIN PRROSSANA Kowalski M.D.   50 mg at 10/24/19 1327   • haloperidol (HALDOL) tablet 5 mg  5 mg Oral Q HOUR PRN Thomas Kowalski M.D.   5 mg at 10/29/19 1604    Or   • haloperidol lactate (HALDOL) injection 5 mg  5 mg Intramuscular Q30 MIN PRROSSANA Kowalski M.D.   5 mg at 10/24/19 1327   • LORazepam (ATIVAN) tablet 2 mg  2 mg Oral Q HOUR PRROSSANA Kowalski M.D.   2 mg at 10/30/19 1220    Or   • LORazepam (ATIVAN) injection 2 mg  2 mg Intramuscular Q30 MIN PRROSSANA Kowalski M.D.   2 mg at 10/24/19 1327     Current Outpatient Medications   Medication Sig Dispense Refill   • QUEtiapine (SEROQUEL) 300 MG tablet Take 1 Tab by mouth every evening. 15 Tab 0   • QUEtiapine (SEROQUEL) 50 MG tablet Take 1 Tab by mouth 2 Times a Day. 30 Tab 0     No results found for this or any previous visit (from the past 24 hour(s)).     Assessment: Psychosis significantly improved. Being transferred to [facility] today    Dx: Schizophrenia  Hx of TBI  Methamphetamine use dso, remission unclear    Plan:  1- Legal hold: court ordered   2- Continue Risperdal 2mg Po BID for psychosis. Declined NICOLE today  3- Pt is being transferred to [facility] today    Thank you for the consult

## 2019-10-30 NOTE — ED PROVIDER NOTES
ED Provider Note    Patient currently resting quietly.  He remains on psychiatric hold awaiting transfer to available facility.  Plan for repeat evaluation by psychiatry later today when possible transfer to available psychiatric facility if bed available.

## 2019-10-30 NOTE — ED NOTES
Patient up to restroom without incident.  Patient also requesting something to help him sleep, HERMILA Kennedy to discuss with MD.

## 2019-10-30 NOTE — ED NOTES
Patient now awake, standing at window in room.  Has no complaints, says he is going to law back down and try to get more sleep.

## 2020-01-30 NOTE — NUR
Pt dc'd to self care. Pt alert, oriented and ambulatory. NAD. Pt reprots 
toradol shot did provide some reflief. Pt edcuated on prescriptions, OTC meds, 
home care and follow-up. Pt VU. Pt ambulated out of ER.

## 2020-01-30 NOTE — NUR
Pt alert and oriented on gurney. Pt watching TV. NAD. Pt reprots right sided 
chest pain starting yesterday. No N/VD. No cardiac hx. MD at bedside for heart 
US. Xray at bedside. Pt in gown and on all monitors. Call light within reach.

## 2020-02-05 NOTE — ED NOTES
Recommended against surgery at this time given that patient is happy with present vision. VSS, due medication given. Breakfast ordered.

## 2020-05-17 ENCOUNTER — HOSPITAL ENCOUNTER (EMERGENCY)
Dept: HOSPITAL 8 - ED | Age: 25
Discharge: HOME | End: 2020-05-17
Payer: MEDICAID

## 2020-05-17 VITALS — BODY MASS INDEX: 27.03 KG/M2 | WEIGHT: 222.01 LBS | HEIGHT: 76 IN

## 2020-05-17 VITALS — SYSTOLIC BLOOD PRESSURE: 138 MMHG | DIASTOLIC BLOOD PRESSURE: 82 MMHG

## 2020-05-17 DIAGNOSIS — F41.1: ICD-10-CM

## 2020-05-17 DIAGNOSIS — Z72.9: ICD-10-CM

## 2020-05-17 DIAGNOSIS — L03.032: ICD-10-CM

## 2020-05-17 DIAGNOSIS — L89.892: ICD-10-CM

## 2020-05-17 DIAGNOSIS — M79.672: Primary | ICD-10-CM

## 2020-05-17 PROCEDURE — 99283 EMERGENCY DEPT VISIT LOW MDM: CPT

## 2020-05-17 NOTE — NUR
Pt became upset on dc, pts family stating that they do not feel safe with him 
at home and would like an IM injection of his home medication. SANDRA Warner spoke 
to family and pt extensively about dc planning. Pt a&ox4, calm and cooperative 
upon dc.

## 2020-05-19 ENCOUNTER — HOSPITAL ENCOUNTER (EMERGENCY)
Dept: HOSPITAL 8 - ED | Age: 25
Discharge: HOME | End: 2020-05-19
Payer: MEDICAID

## 2020-05-19 VITALS — DIASTOLIC BLOOD PRESSURE: 64 MMHG | SYSTOLIC BLOOD PRESSURE: 127 MMHG
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
## 2020-05-19 DIAGNOSIS — F10.120: Primary | ICD-10-CM

## 2020-05-19 PROCEDURE — 99285 EMERGENCY DEPT VISIT HI MDM: CPT

## 2020-05-19 NOTE — NUR
PT RESTING ON GURNEY, RESPONDS TO PAINFUL STIMULI WITH INCOMPREHENSIBLE SOUNDS, 
MONITORS IN PLACE, SUCTION AT BEDSIDE, SIDERAILS UP X2, CALL LIGHT WITHIN 
REACH.

## 2020-05-19 NOTE — NUR
pt resting with hob up and positioned onto side with pillows, suction at 
bedside and used for secretions, pt responds to painful stimuli with 
uncomprehensible sounds and opened eyes slightly, pt did state name is "ross" 
when this rn asked him his name and than he quickly closed eyes and dosed off 
again
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
## 2020-05-19 NOTE — NUR
pt sitting up on gurney, respirations even and unlabored, monitors in place, 
vss, call light within reach

## 2020-05-19 NOTE — NUR
pt awake and up in room putting personal belongings in his pockets, stated " 
i'm fine and i'm ready to go, how long do i need to wait". erp updated 
regarding pt states, pt with steady gait and answering qustions appropriately

## 2020-05-19 NOTE — NUR
PT RESTING WITH EYES CLOSED SNORING, SUCTIONED SECRETIONS, MONITOR IN PLACE, 
VSS, CALL LIGHT WITHIN REACH

## 2020-06-23 ENCOUNTER — HOSPITAL ENCOUNTER (EMERGENCY)
Dept: HOSPITAL 8 - ED | Age: 25
Discharge: HOME | End: 2020-06-23
Payer: MEDICAID

## 2020-06-23 VITALS — SYSTOLIC BLOOD PRESSURE: 136 MMHG | DIASTOLIC BLOOD PRESSURE: 81 MMHG

## 2020-06-23 VITALS — BODY MASS INDEX: 26.85 KG/M2 | WEIGHT: 220.46 LBS | HEIGHT: 76 IN

## 2020-06-23 DIAGNOSIS — Z88.8: ICD-10-CM

## 2020-06-23 DIAGNOSIS — F43.0: Primary | ICD-10-CM

## 2020-06-23 DIAGNOSIS — Z88.1: ICD-10-CM

## 2020-06-23 LAB
ALBUMIN SERPL-MCNC: 3.6 G/DL (ref 3.4–5)
ALP SERPL-CCNC: 66 U/L (ref 45–117)
ALT SERPL-CCNC: 28 U/L (ref 12–78)
ANION GAP SERPL CALC-SCNC: 5 MMOL/L (ref 5–15)
BASOPHILS # BLD AUTO: 0.04 X10^3/UL (ref 0–0.1)
BASOPHILS NFR BLD AUTO: 1 % (ref 0–1)
BILIRUB SERPL-MCNC: 0.5 MG/DL (ref 0.2–1)
CALCIUM SERPL-MCNC: 8.5 MG/DL (ref 8.5–10.1)
CHLORIDE SERPL-SCNC: 108 MMOL/L (ref 98–107)
CREAT SERPL-MCNC: 0.79 MG/DL (ref 0.7–1.3)
EOSINOPHIL # BLD AUTO: 0.34 X10^3/UL (ref 0–0.4)
EOSINOPHIL NFR BLD AUTO: 5 % (ref 1–7)
ERYTHROCYTE [DISTWIDTH] IN BLOOD BY AUTOMATED COUNT: 14.5 % (ref 9.4–14.8)
LYMPHOCYTES # BLD AUTO: 2.29 X10^3/UL (ref 1–3.4)
LYMPHOCYTES NFR BLD AUTO: 35 % (ref 22–44)
MCH RBC QN AUTO: 29.2 PG (ref 27.5–34.5)
MCHC RBC AUTO-ENTMCNC: 32.5 G/DL (ref 33.2–36.2)
MCV RBC AUTO: 90 FL (ref 81–97)
MD: NO
MONOCYTES # BLD AUTO: 0.38 X10^3/UL (ref 0.2–0.8)
MONOCYTES NFR BLD AUTO: 6 % (ref 2–9)
NEUTROPHILS # BLD AUTO: 3.44 X10^3/UL (ref 1.8–6.8)
NEUTROPHILS NFR BLD AUTO: 53 % (ref 42–75)
PLATELET # BLD AUTO: 262 X10^3/UL (ref 130–400)
PMV BLD AUTO: 8.7 FL (ref 7.4–10.4)
PROT SERPL-MCNC: 7.4 G/DL (ref 6.4–8.2)
RBC # BLD AUTO: 4.89 X10^6/UL (ref 4.38–5.82)
VANCOMYCIN TROUGH SERPL-MCNC: 2 MG/DL (ref 2.8–20)

## 2020-06-23 PROCEDURE — 36415 COLL VENOUS BLD VENIPUNCTURE: CPT

## 2020-06-23 PROCEDURE — 84443 ASSAY THYROID STIM HORMONE: CPT

## 2020-06-23 PROCEDURE — 85025 COMPLETE CBC W/AUTO DIFF WBC: CPT

## 2020-06-23 PROCEDURE — 99283 EMERGENCY DEPT VISIT LOW MDM: CPT

## 2020-06-23 PROCEDURE — 80307 DRUG TEST PRSMV CHEM ANLYZR: CPT

## 2020-06-23 PROCEDURE — 80053 COMPREHEN METABOLIC PANEL: CPT
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
## 2020-10-27 ENCOUNTER — HOSPITAL ENCOUNTER (EMERGENCY)
Facility: MEDICAL CENTER | Age: 25
End: 2020-10-28
Attending: EMERGENCY MEDICINE
Payer: MEDICAID

## 2020-10-27 DIAGNOSIS — R45.850 HOMICIDAL IDEATION: ICD-10-CM

## 2020-10-27 LAB — POC BREATHALIZER: 0 PERCENT (ref 0–0.01)

## 2020-10-27 PROCEDURE — 90791 PSYCH DIAGNOSTIC EVALUATION: CPT

## 2020-10-27 PROCEDURE — 99285 EMERGENCY DEPT VISIT HI MDM: CPT

## 2020-10-27 PROCEDURE — 302970 POC BREATHALIZER: Performed by: EMERGENCY MEDICINE

## 2020-10-27 RX ORDER — LORAZEPAM 1 MG/1
1 TABLET ORAL ONCE
Status: DISCONTINUED | OUTPATIENT
Start: 2020-10-27 | End: 2020-10-28 | Stop reason: HOSPADM

## 2020-10-27 NOTE — ED TRIAGE NOTES
Pt placed in a hospital gown, all of pts belongings bagged & labelled.  All potentially dangerous objects removed from the room.

## 2020-10-27 NOTE — ED NOTES
Pt pacing in room, security at bedside.  Pt taps on window to get their attention to ask for help.  NAD at this time.

## 2020-10-27 NOTE — ED NOTES
"Patient stated that he was \"burning up because I took MDMA\". Pt. Stated he took some last night  "
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
## 2020-10-28 VITALS
WEIGHT: 240 LBS | OXYGEN SATURATION: 95 % | TEMPERATURE: 97.7 F | DIASTOLIC BLOOD PRESSURE: 79 MMHG | SYSTOLIC BLOOD PRESSURE: 133 MMHG | HEIGHT: 72 IN | BODY MASS INDEX: 32.51 KG/M2 | HEART RATE: 71 BPM | RESPIRATION RATE: 17 BRPM

## 2020-10-28 NOTE — ED NOTES
Patient resting in bed, appears to be sleeping with even rise and fall of chest noted. No obvious signs of pain or distress, sitter in direct view of patient with no concerns verbalized, repositions self occassionally, bed in low position, safety room features in place, will continue to assess patient.

## 2020-10-28 NOTE — DISCHARGE PLANNING
Alert Team  Consult order noted.  Pt is not currently ready for consult.  Pt will need:  -medical clearance per the ERP  -legal sobriety noted in labs section of Epic.  Please attempt to have UDS sent and registration completed by PAR prior to AT consult.  Once pt is ready for consult, bedside RN to contact AT and have pt added to list of consults.

## 2020-10-28 NOTE — DISCHARGE PLANNING
Medical Social Work    MSW received a call from [place] with [facility] who states that they do not currently have beds available for psychosis pt's at this time.
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
## 2020-10-28 NOTE — DISCHARGE PLANNING
Medical Social Work    Referral: Legal Hold    Intervention: Legal Hold Paperwork given to SW by Life Skills RN: Katherin    Legal Hold Initiated: Date: 10/27/2020  Time: 1530    Legal Hold faxed: Date: 10/27/2020  Time: 3983    Patient’s Insurance Listed on Face Sheet: [place] Medicaid    Referrals sent to: [facility] and [facility]    Plan: Patient will transfer to mental health facility once acceptance is obtained.     Pt's UDS has not been collected yet.

## 2020-10-28 NOTE — DISCHARGE PLANNING
Medical Social Work    Referral: Legal hold Transfer to Mental Health Facility    Intervention: TRENTON received call from Angelina at [facility] stating that Dr. Masters has accepted the patient for admission.  Angelina is requesting a 0330 transport time due to other admissions.    TRENTON arranged for transportation to be set up through [facility] with [facility].    Pt has transport benefit through [facility]; arranged with Isabel at [facility].     The pt will be picked up at 0330.     TRENTON notified the RN of the departure time as well as accepting facility.     TRENTON created transfer packet and placed on chart. Original Legal Hold placed in packet.     Plan: Pt will transfer to [facility] at 0330.

## 2020-10-28 NOTE — ED NOTES
Pt reminded of need of urine sample for UDS  Pt reports he is unable to void at this time.  RN provided cup of water and pt agreed to try after drinking.  Security remains at bedside.
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
## 2020-12-17 ENCOUNTER — HOSPITAL ENCOUNTER (EMERGENCY)
Dept: HOSPITAL 8 - ED | Age: 25
LOS: 2 days | Discharge: PSYCHIATRIC OR SUBSTANCE USE FACILITY | End: 2020-12-19
Payer: MEDICAID

## 2020-12-17 VITALS — HEIGHT: 72 IN | WEIGHT: 176.37 LBS | BODY MASS INDEX: 23.89 KG/M2

## 2020-12-17 DIAGNOSIS — R41.82: ICD-10-CM

## 2020-12-17 DIAGNOSIS — F23: Primary | ICD-10-CM

## 2020-12-17 LAB
ALBUMIN SERPL-MCNC: 3.8 G/DL (ref 3.4–5)
ANION GAP SERPL CALC-SCNC: 7 MMOL/L (ref 5–15)
BASOPHILS # BLD AUTO: 0.1 X10^3/UL (ref 0–0.1)
BASOPHILS NFR BLD AUTO: 1 % (ref 0–1)
CALCIUM SERPL-MCNC: 8.9 MG/DL (ref 8.5–10.1)
CHLORIDE SERPL-SCNC: 107 MMOL/L (ref 98–107)
CREAT SERPL-MCNC: 0.76 MG/DL (ref 0.7–1.3)
EOSINOPHIL # BLD AUTO: 0.6 X10^3/UL (ref 0–0.4)
EOSINOPHIL NFR BLD AUTO: 6 % (ref 1–7)
ERYTHROCYTE [DISTWIDTH] IN BLOOD BY AUTOMATED COUNT: 15 % (ref 9.4–14.8)
LYMPHOCYTES # BLD AUTO: 3.1 X10^3/UL (ref 1–3.4)
LYMPHOCYTES NFR BLD AUTO: 35 % (ref 22–44)
MCH RBC QN AUTO: 30.2 PG (ref 27.5–34.5)
MCHC RBC AUTO-ENTMCNC: 33.6 G/DL (ref 33.2–36.2)
MD: NO
MONOCYTES # BLD AUTO: 0.6 X10^3/UL (ref 0.2–0.8)
MONOCYTES NFR BLD AUTO: 7 % (ref 2–9)
NEUTROPHILS # BLD AUTO: 4.6 X10^3/UL (ref 1.8–6.8)
NEUTROPHILS NFR BLD AUTO: 51 % (ref 42–75)
PLATELET # BLD AUTO: 235 X10^3/UL (ref 130–400)
PMV BLD AUTO: 9 FL (ref 7.4–10.4)
RBC # BLD AUTO: 4.62 X10^6/UL (ref 4.38–5.82)
VANCOMYCIN TROUGH SERPL-MCNC: < 1.7 MG/DL (ref 2.8–20)

## 2020-12-17 PROCEDURE — 82040 ASSAY OF SERUM ALBUMIN: CPT

## 2020-12-17 PROCEDURE — 80307 DRUG TEST PRSMV CHEM ANLYZR: CPT

## 2020-12-17 PROCEDURE — 80048 BASIC METABOLIC PNL TOTAL CA: CPT

## 2020-12-17 PROCEDURE — 36415 COLL VENOUS BLD VENIPUNCTURE: CPT

## 2020-12-17 PROCEDURE — 96372 THER/PROPH/DIAG INJ SC/IM: CPT

## 2020-12-17 PROCEDURE — 80320 DRUG SCREEN QUANTALCOHOLS: CPT

## 2020-12-17 PROCEDURE — G0480 DRUG TEST DEF 1-7 CLASSES: HCPCS

## 2020-12-17 PROCEDURE — 80329 ANALGESICS NON-OPIOID 1 OR 2: CPT

## 2020-12-17 PROCEDURE — 80299 QUANTITATIVE ASSAY DRUG: CPT

## 2020-12-17 PROCEDURE — 85025 COMPLETE CBC W/AUTO DIFF WBC: CPT

## 2020-12-17 PROCEDURE — 99285 EMERGENCY DEPT VISIT HI MDM: CPT

## 2020-12-17 RX ADMIN — OLANZAPINE PRN MG: 10 INJECTION, POWDER, LYOPHILIZED, FOR SOLUTION INTRAMUSCULAR at 23:15

## 2020-12-17 RX ADMIN — OLANZAPINE PRN MG: 10 INJECTION, POWDER, LYOPHILIZED, FOR SOLUTION INTRAMUSCULAR at 13:01

## 2020-12-17 NOTE — NUR
PT YELLING OUT RANDOM SENTENCES.  " I HAVE BEEN WORKING OUT".  "PLEASE GIVE ME 
AN EXORCISM".  NOT ANSERWING QUESTIONS APPROPRIATLY,

## 2020-12-17 NOTE — NUR
RR equal and unlabored. Adequate csmtp to wrists/ankles. Sitter in line of 
site, restraint form being updated per policy.

## 2020-12-17 NOTE — NUR
Per sitter pt became increasingly aggitated. When this nurse arrived pt is in 
doorway, clenched fists, aggressive stance, pt refusing redirection. Security 
at bedside, pt placed in 4pt restraints. Injection of prn psych med given 
without events. Requested and obtained 4pt restraint order from [facility]. CN 
and Supervisor informed and aware of events causing escalation.

## 2020-12-17 NOTE — NUR
THIS IS A 25 YEAR OLD MALE BIB AMBULANCE DUE TO "AGGRESSIVE BEHAVIOR, BREAKING 
THINGS IN HOUSE"

PT STATES HE IS SI, "I WANT AN EXORCISM".  PT HAS HX OF TBI, PER EMS, PT PLACED 
ON LEGAL HOLD BY [employer].  PT CAME IN WITH RESTRAINTS FOR SAFETY, PT PLACED IN TWO 
POINT RESTRAINTS AT THIS TIME

## 2020-12-17 NOTE — NUR
Report from YARIEL potter. First contact, pt up ambulates to bathroom to provided 
Urine sample. Pt states "do you think im crazy" then says "please get me an 
exorcism". Dinner was provided but he says he doesn't want to eat now. Sitter 
in line of site, room secure. Will continue to monitor.

## 2020-12-18 RX ADMIN — OLANZAPINE PRN MG: 10 INJECTION, POWDER, LYOPHILIZED, FOR SOLUTION INTRAMUSCULAR at 17:29

## 2020-12-18 NOTE — NUR
CALLED [facility], AND UPDATED THAT RESTRAINTS REMOVED AT 1954. WILL CALL BACK WITH 
UPDATES.

## 2020-12-18 NOTE — NUR
Pt down to 2pt leather restraints. Mostly sleeping, calm when wakes up doesn't 
respond to any questions. RR equal and unlabored.

## 2020-12-18 NOTE — NUR
THIS RN ATTEMPTED TO TAKE VITAL SIGNS AND DID GET A BLOOD PRESSURE, PULSE AND 
RESPIRATORY RATE.  HOWEVER, THE PATIENT RAISED HIS CLENCHED FIST VERY SWIFTLY 
AT ME AND SAID, "WARNING".  HE HAD PREVIOUSLY ASKED ME IF I WANTED TO BOX, AT 
WHICH TIME I TOLD HIM NO.  UNABLE TO FINISH TAKING VITAL SIGNS DUE TO SAFETY 
CONCERNS.  HOWEVER, PATIENT IS IN NO DISTRESS AND HAS NO PHYSICAL COMPLAINTS.  
I SUSPECT THAT HIS TEMPERATURE IS NORMAL.  NOTIFIED SUPERVISOR, REJI, OF 
PATIENT'S THREAT AND THAT I AM NOT TAKING THE OTHER VITALS.  REJI APPROVES OF 
THIS.

## 2020-12-18 NOTE — NUR
SBAR HAND-OFF REPORT GIVEN TO YARIEL ALMODOVAR AT [facility].  SCOOBY WILL CONTACT THEIR MD FOR 
POSSIBLE ACCEPTANCE TO [facility].

## 2020-12-18 NOTE — NUR
Pt now up states he doesn;'t eat meat. Removed and crackers provided. Pt 
standing at glass at door staring at sitter and this nurse, exercising. 
Security asked to stand by, as soon as security arrived pt jumped into the 
gurney. Pt appears to be watching everything staff is doing. Informed 
Supervisor of this behavior. Will continue to monitor.

## 2020-12-18 NOTE — NUR
REPORT RECIEVE FROM [facility]. PT STILL ACCEPTED, AND ASKED TO BE UPDATED 
ON RESTRAINT STATUS.

## 2020-12-18 NOTE — NUR
[facility] ran from [facility] when they escorted him out to the ambulance.  
[facility], who [facility]uffed patient and escorted him, along with [facility] 
medics, back into the ED and into room 3.  Discharge was cancelled and [facility] 
refused to take patient.  Patient then walked out of his room and down by the 
nurses station.  He was escorted by security back to his room and put into four 
point restraints due to escalation and agitation.  APRN Estela consulted and 
PO and IM PRN meds ordered.  Patient agreed to take PO meds and needed to void. 
 Patient requested to be out of restraints and stated that he would be 
cooperative.  Restraints removed.  Patient ambulated to the bathroom and voided 
and walked back to his room.  Brought PRN ativan and Zyprexa PO to patient but 
patient refused.  House supervisor brought patient rosmery felicia to read.  pt is 
staying in the room at this time.  Water given to patient as well as orange 
juice at his request, but he later refused it.

## 2020-12-18 NOTE — NUR
SPOKE WITH PATIENT ABOUT BEHAVIOR AND NEED TO STAY IN ROOM. PT STATES FEELING 
MORE DROWSY FROM MEDS GIVEN EARLIER AND STATES HE WILL BE COOPERATIVE WITH THIS 
RN AND STAFF. SECURITY CALLED TO REMOVE RESTRAINTS.

## 2020-12-18 NOTE — NUR
PT LEFT HIS ROOM AND APPROACHED THIS RN IN A THREATENING MANNER AND TRIED TO 
LEAVE VIA THE AMBULANCE BAY DOOR.  REDIRECTED PATIENT BACK TO HIS ROOM AND 
CALLED SECURITY.  PT REPEATEDLY MOVED TOWARD THIS RN.  PATIENT RESTRAINED IN 
TWO POINT RESTRAINTS AS ORDERED BY DR. RUIZ.  PT MEDICATED AND REMAINS 
UNDER CONSTANT VISUAL SUPERVISION OF SITTER AND REMAINS SAFE.

## 2020-12-18 NOTE — NUR
PATIENT IS REQUESTING A [facility] PORTION OF THE KORAN TO BE READ TO HIM BY [facility] 
[facility].  CALLED FATHER TARIQ WHO CALLED BACK AND STATED THAT HE WILL TRY TO 
FIND A [facility] CLERGYMAN TO COME IN.

## 2020-12-18 NOTE — NUR
Remains sleeping, eyes closed, RR equal and unlabored. Sitter in line of site; 
will continue to monitor.

## 2020-12-19 VITALS — DIASTOLIC BLOOD PRESSURE: 70 MMHG | SYSTOLIC BLOOD PRESSURE: 113 MMHG

## 2020-12-19 RX ADMIN — OLANZAPINE ONE MG: 5 TABLET, FILM COATED ORAL at 02:53

## 2020-12-19 NOTE — NUR
PT LAYING ON SIDE ON ED GURNEY. EVEN RISE AND FALL OF CHEST NOTED. PT IN FULL 
VIEW OF SITTER. ROOM SECURED. NAD.

## 2020-12-19 NOTE — NUR
SPOKE WITH RB, PT HAS BEEN OFF RESTRAINTS AND UPDATED ON ORAL MEDS GIVEN. WAS 
TOLD TO GET A CALL BACK AFTER CONFERING WITH DOCTOR.

## 2020-12-19 NOTE — NUR
PT BREAKFAST TRAY DELIVERED. PT LOOKED AT RN, BUT DID NOT ACKNOWLEDGE 
PRESCENCE. PT TURNED ON SIDE AND CLOSED EYES. EVEN RISE AND FALL OF CHEST 
NOTED. PT IN FULL VIEW OF SITTER. ROOM SECURED.

## 2020-12-19 NOTE — NUR
CALL TO [facility] FOR FAX VERIFICATION. [facility] REQUESTS FACESHEET. TRANSPORTATION 
REQUEST AND FACESHEET RE-FAXED. "I'LL PUT IT IN FOR 9236" PER [facility] 
TRANSPORTATION FACILITATOR.

## 2020-12-19 NOTE — NUR
PER YARY AT [facility], OBSERVE PT UNTIL 0900. IF NO RESTRAINTS AT THAT TIME, CALL 
[facility] AND ARRANGE FOR PT TO BE TRANSFERRED THERE.

## 2020-12-19 NOTE — NUR
SPOKE TO YARY RN. VERIFIED THAT MEDICATUION ADMINISTERED WAS ORALLY. RN TO 
SPEAK WITH MD AND CALL BACK ON STATUS

## 2020-12-19 NOTE — NUR
JANEY AT [facility] RECONTACTED REGARDING PT. JANEY WILL REVIEW WITH THE DR AND ADVISE 
ON ACCEPTANCE.

## 2020-12-19 NOTE — NUR
PT ATE SOME FOOD EARLIER, AND WENT BACK TO BED SHORTLY AFTER, AND APPREARS 
COMFORTABLY SLEEPING. IN LINE OF SIGHT OF NIGEL

## 2020-12-19 NOTE — NUR
TASK RN NOTE:

[facility] AND [employer] PRESENT TO FACILITATE TRANSPORT. CURRENTLY AWAITING formerly [facility] DIRECTOR 
CALL REGARDING POSSIBLE RESTRAINT USE.

## 2020-12-19 NOTE — NUR
CALL BACK TO [facility] TRANSPORT TO VERIFY FAX. PLAN FOR TRANSPORT AT 12:45.

## 2020-12-19 NOTE — NUR
PT WOKE UP AROUND 0245 AND WAS AGITATED AND STATED HE WANTED TO GO HOME. PT WAS 
EXPLAINED THAT HE IS ON A LEGAL HOLD AND WE ARE ATTEMPTING TO GET HIM TO [facility]. 
PT STATED HE DOES NOT UNDERSTAND BEING ON A HOLD, AND DOES NOT WANT TO GO BACK 
TO [facility]. DR. HU SPOKE WITH PT EXPLAINING LEGAL HOLD AND PROCESS, PT CONTINUES 
TO ASK MD TO EXPLAIN AND THENPOINTS HIS MIDDLE FINGER AT HIM. HOWEVER, PT 
AGREES TO TAKE PILLS TO CALM DOWN AND GET REST. MEDICATION ADMINISTERED PER 
EMAR.

## 2020-12-19 NOTE — NUR
REPORT REC'VD FROM YARIEL EDWARDS. PT RESTING ON HOSPITAL BED. EVEN RISE OF CHEST 
NOTED. PT IN FULL VIEW OF SITTER. ROOM SECURED.

## 2020-12-19 NOTE — NUR
TASK RN NOTE:

[facility] CALLED FOR ETA OF PT. RN NOTIFIED FACILITY THAT [facility] SCHEDULED FOR 12:45 
BUT REPORTED THEY MAY BE RUNNING BEHIND. AWAITING FOR INTERFACILITY 
TRANSPORTATION.

## 2021-01-01 ENCOUNTER — HOSPITAL ENCOUNTER (EMERGENCY)
Facility: MEDICAL CENTER | Age: 26
End: 2021-01-01
Attending: EMERGENCY MEDICINE | Admitting: EMERGENCY MEDICINE
Payer: MEDICAID

## 2021-01-01 VITALS
HEART RATE: 108 BPM | SYSTOLIC BLOOD PRESSURE: 145 MMHG | RESPIRATION RATE: 18 BRPM | BODY MASS INDEX: 24.56 KG/M2 | TEMPERATURE: 97.6 F | DIASTOLIC BLOOD PRESSURE: 94 MMHG | HEIGHT: 76 IN | OXYGEN SATURATION: 96 % | WEIGHT: 201.72 LBS

## 2021-01-01 DIAGNOSIS — L02.91 ABSCESS: ICD-10-CM

## 2021-01-01 PROCEDURE — 87186 SC STD MICRODIL/AGAR DIL: CPT

## 2021-01-01 PROCEDURE — 700101 HCHG RX REV CODE 250: Performed by: EMERGENCY MEDICINE

## 2021-01-01 PROCEDURE — 99284 EMERGENCY DEPT VISIT MOD MDM: CPT

## 2021-01-01 PROCEDURE — 87147 CULTURE TYPE IMMUNOLOGIC: CPT

## 2021-01-01 PROCEDURE — 700102 HCHG RX REV CODE 250 W/ 637 OVERRIDE(OP): Performed by: EMERGENCY MEDICINE

## 2021-01-01 PROCEDURE — A9270 NON-COVERED ITEM OR SERVICE: HCPCS | Performed by: EMERGENCY MEDICINE

## 2021-01-01 PROCEDURE — 87077 CULTURE AEROBIC IDENTIFY: CPT

## 2021-01-01 PROCEDURE — 87070 CULTURE OTHR SPECIMN AEROBIC: CPT

## 2021-01-01 PROCEDURE — 303977 HCHG I & D

## 2021-01-01 PROCEDURE — 87205 SMEAR GRAM STAIN: CPT

## 2021-01-01 RX ORDER — LIDOCAINE HYDROCHLORIDE AND EPINEPHRINE 10; 10 MG/ML; UG/ML
20 INJECTION, SOLUTION INFILTRATION; PERINEURAL ONCE
Status: COMPLETED | OUTPATIENT
Start: 2021-01-01 | End: 2021-01-01

## 2021-01-01 RX ORDER — CLINDAMYCIN HYDROCHLORIDE 150 MG/1
150 CAPSULE ORAL ONCE
Status: COMPLETED | OUTPATIENT
Start: 2021-01-01 | End: 2021-01-01

## 2021-01-01 RX ADMIN — CLINDAMYCIN HYDROCHLORIDE 150 MG: 150 CAPSULE ORAL at 15:22

## 2021-01-01 RX ADMIN — LIDOCAINE HYDROCHLORIDE AND EPINEPHRINE 20 ML: 10; 10 INJECTION, SOLUTION INFILTRATION; PERINEURAL at 13:30

## 2021-01-01 NOTE — ED PROVIDER NOTES
"ED Provider Note    CHIEF COMPLAINT  Chief Complaint   Patient presents with   • Wound Check     multiple open non draining lesions to left leg       HPI  Donald Masters is a 25 y.o. male who [facility] emergency department [facility] where he is on a extended hold.  He is currently on clindamycin for boils on his left lower extremity that are not improving with antibiotic therapy.  He was sent to the ER for evaluation.  He has not had a fever wound    REVIEW OF SYSTEMS  Positive for leg woundsNegative for fever, chills  PAST MEDICAL HISTORY   has a past medical history of Psychiatric disorder and TBI (traumatic brain injury) ([facility]).    SOCIAL HISTORY  Social History     Tobacco Use   • Smoking status: Current Every Day Smoker   Substance and Sexual Activity   • Alcohol use: Yes   • Drug use: Yes     Comment: THC   • Sexual activity: Not on file       SURGICAL HISTORY  patient denies any surgical history    CURRENT MEDICATIONS  Reviewed.  See Encounter Summary.  Include Clindamycin    ALLERGIES  Allergies   Allergen Reactions   • Beef-Derived Products    • Doxycycline    • Haldol [Haloperidol]    • Pork Derived Products    • Penicillins        PHYSICAL EXAM  VITAL SIGNS: /82   Pulse (!) 110   Temp 36.4 °C (97.6 °F) (Temporal)   Resp 18   Ht 1.93 m (6' 4\")   Wt 91.5 kg (201 lb 11.5 oz)   SpO2 98%   BMI 24.55 kg/m²   Constitutional: , Alert in no apparent distress.  HENT: Normocephalic, Bilateral external ears normal. Nose normal.   Eyes: Pupils are equal and reactive. Conjunctiva normal, non-icteric.   Thorax & Lungs: Easy unlabored respirations  Abdomen:  No gross signs of peritonitis, no pain with movement   Skin: Visualized skin is  Dry, No erythema, No rash.  Multiple boils with purulence noted left lateral leg  Extremities:   No edema, No asymmetry  Neurologic: Alert, Grossly non-focal.   Psychiatric: Affect and Mood normal      COURSE & MEDICAL DECISION MAKING  Nursing notes and " vital signs were reviewed. (See chart for details)    The patient presents to the Emergency Department with multiple enlarging boils on left lateral leg that are not improving with antibiotic therapy.  We had a discussion here and the patient verbally agreed to have drainage of them.    Procedure note: Using Betadine the patient's left leg was cleansed with Betadine, he was anesthetized with 1% lidocaine with epinephrine and using an 11 blade scalpel a open for lesions on his leg with a minimal amount of purulence less than 1 cc each drained.  A bandage was placed.  The pus was sent for culture.    The patient is on a prolonged hold and he is being discharged back to renal behavioral.  There is no signs of cellulitis toxicity sepsis.  He is to continue clindamycin unless cultures reveal a need for change of antibiotic therapy        FINAL IMPRESSION  1.  leg abscess x4. status post incision drainage    Electronically signed by: Tamiko Parsons M.D., 1/1/2021 1:45 PM

## 2021-01-01 NOTE — ED NOTES
Pt and sitter aware of pending transfer/return to [facility]. Security called to assist as pt observed rocking and softly banging in to sitter

## 2021-01-01 NOTE — LETTER
1/3/2021               Donald Masters  [address] [place] [address]        Dear Donald (MR#[id number])    This letter is sent in regards to your, recent visit to the [facility] Emergency Department on 1/1/2021.  During the visit, tests were performed to assist the physician in a medical diagnosis.  A review of those tests requires that we notify you of the following:    Your wound culture was POSITIVE for a bacteria called Methicillin Resistant Staphylococcus aureus (MRSA). The antibiotic prescribed for you (clindamycin) should be active to treat this bacteria. IT IS IMPORTANT THAT YOU CONTINUE TAKING YOUR ANTIBIOTIC UNTIL IT IS FINISHED.      Please feel free to contact me at the number below if you have any questions or concerns. Thank you for your cooperation in the matter.    Sincerely,  ED Culture Follow-Up Staff  Artur Orozco PharmD    [facility], Emergency Department  [address] [place] [id number]  [phone] (ED Culture Line)  [phone]

## 2021-01-01 NOTE — DISCHARGE PLANNING
Anticipated Discharge Disposition: Return to [facility].     Action: Call to House Supervisor at [facility] to advise of return to unit, vm left. [facility] PCS started and [facility] started. No Legal copy except extension at present it is at facility.    Barriers to Discharge: awareness of return, transport    Plan: Transfer as soon as able.

## 2021-01-01 NOTE — DISCHARGE PLANNING
Anticipated Discharge Disposition: Unknown  Action: Call to [facility] spoke to Rubens it with Supervisor and they need to get it from him. They have both ER CM fax and ER Dept fax numbers    Barriers to Discharge: Unknown    Plan: Cont to follow,

## 2021-01-01 NOTE — ED TRIAGE NOTES
Pt to er via [facility] from [facility] with sitter for c/o wounds to left leg. Per chart, pt has been receiving clindamycin 150mg po tid x 3 days w/o improvement is same. Pt unable to verbalize how wounds occurred. Is on legal hold from [facility]

## 2021-01-01 NOTE — ED NOTES
Update to [facility] tech regarding pending  [id number]. Water provided-security rounding

## 2021-01-01 NOTE — DISCHARGE PLANNING
Anticipated Discharge Disposition: Unknown    Action: Call to Anai on unit [facility] . Provided fax for ER CM request that they fax original legal hold and court extension done 12.21.2020 to CM. [employer] receipt sent but not the rest of legal.    Barriers to Discharge: Unknown    Plan: Await fax

## 2021-01-01 NOTE — ED NOTES
[facility] here for pt . Med per [facility] request. Call placed to same regarding pt departure. Report to patrice vora

## 2021-01-01 NOTE — DISCHARGE PLANNING
Anticipated Discharge Disposition: Transfer back to [facility].    Action: Call back from unit. They thought sent legal w pt. Advised ER CM got extension paper receipt only. ER CM set transport via [facility] and [facility] [id number] . Report to [phone] Room  [address]     Barriers to Discharge: None  Plan: Transport back with chart copy

## 2021-01-02 LAB
GRAM STN SPEC: NORMAL
SIGNIFICANT IND 70042: NORMAL
SITE SITE: NORMAL
SOURCE SOURCE: NORMAL

## 2021-01-03 LAB
BACTERIA WND AEROBE CULT: ABNORMAL
BACTERIA WND AEROBE CULT: ABNORMAL
GRAM STN SPEC: ABNORMAL
SIGNIFICANT IND 70042: ABNORMAL
SITE SITE: ABNORMAL
SOURCE SOURCE: ABNORMAL

## 2021-01-03 NOTE — ED NOTES
"ED Positive Culture Follow-up/Notification Note:    Date: 1/3/21     Patient seen in the ED on 1/1/2021 for wound check. Pt had multiple open non-draining lesions to his left leg. He was currently on clindamycin for boils on his LLE that did not seem to be improving. Pt underwent I&D in the ED and was subsequently continued on abx.  1. Abscess       Discharge Medication List as of 1/1/2021  1:50 PM          Allergies: Beef-derived products, Doxycycline, Haldol [haloperidol], Pork derived products, and Penicillins     Vitals:    01/01/21 1301 01/01/21 1303 01/01/21 1310 01/01/21 1508   BP:  130/82  145/94   Pulse:  (!) 110  (!) 108   Resp:  18  18   Temp:  36.4 °C (97.6 °F)     TempSrc:  Temporal     SpO2:  98%  96%   Weight:   91.5 kg (201 lb 11.5 oz)    Height: 1.93 m (6' 4\")          Final cultures:   Results     Procedure Component Value Units Date/Time    CULTURE WOUND W/ GRAM STAIN [[id number]]  (Abnormal)  (Susceptibility) Collected: 01/01/21 1342    Order Status: Completed Specimen: Wound from Left Leg Updated: 01/03/21 1106     Significant Indicator POS     Source WND     Site LEFT LEG     Culture Result -     Gram Stain Result No organisms seen.     Culture Result Methicillin Resistant Staphylococcus aureus  Moderate growth      Narrative:      CALL  Rahman  EDS tel. [phone],  CALLED  EDSM tel. [phone] 01/03/2021, 11:05, RB PERF. RESULTS CALLED  TO:[id number]    Susceptibility     Methicillin resistant staphylococcus aureus (1)     Antibiotic Interpretation Microscan Method Status    Azithromycin Resistant >4 mcg/mL ERMIAS Final    Clindamycin Sensitive <=0.5 mcg/mL ERMIAS Final    Cefazolin Resistant <=8 mcg/mL ERMIAS Final    Ceftaroline Sensitive <=0.5 mcg/mL ERMIAS Final    Daptomycin Sensitive <=1 mcg/mL ERMIAS Final    Ampicillin/sulbactam Resistant 16/8 mcg/mL ERMIAS Final    Erythromycin Resistant >4 mcg/mL ERMIAS Final    Vancomycin Sensitive 1 mcg/mL ERMIAS Final    Oxacillin Resistant >2 mcg/mL ERMIAS Final    Penicillin " Resistant >8 mcg/mL ERMIAS Final    Trimeth/Sulfa Sensitive <=0.5/9.5 mcg/mL ERMIAS Final    Tetracycline Sensitive <=4 mcg/mL ERMIAS Final                   GRAM STAIN [[id number]] Collected: 01/01/21 1342    Order Status: Completed Specimen: Wound Updated: 01/02/21 0920     Significant Indicator .     Source WND     Site LEFT LEG     Gram Stain Result No organisms seen.          Plan:   Appropriate antibiotic therapy prescribed. No changes required based upon culture result.  Sent letter to patient to notify of positive culture result and encourage compliance with prescribed antibiotics.     Artur Orozco, PharmD

## 2021-01-15 ENCOUNTER — HOSPITAL ENCOUNTER (EMERGENCY)
Dept: HOSPITAL 8 - ED | Age: 26
Discharge: HOME | End: 2021-01-15
Payer: MEDICAID

## 2021-01-15 VITALS — DIASTOLIC BLOOD PRESSURE: 79 MMHG | SYSTOLIC BLOOD PRESSURE: 131 MMHG

## 2021-01-15 VITALS — WEIGHT: 198.42 LBS | HEIGHT: 76 IN | BODY MASS INDEX: 24.16 KG/M2

## 2021-01-15 DIAGNOSIS — R45.851: ICD-10-CM

## 2021-01-15 DIAGNOSIS — R45.1: ICD-10-CM

## 2021-01-15 DIAGNOSIS — F33.9: Primary | ICD-10-CM

## 2021-01-15 DIAGNOSIS — F17.210: ICD-10-CM

## 2021-01-15 LAB
ALBUMIN SERPL-MCNC: 4 G/DL (ref 3.4–5)
ALP SERPL-CCNC: 73 U/L (ref 45–117)
ALT SERPL-CCNC: 23 U/L (ref 12–78)
ANION GAP SERPL CALC-SCNC: 6 MMOL/L (ref 5–15)
BASOPHILS # BLD AUTO: 0.1 X10^3/UL (ref 0–0.1)
BASOPHILS NFR BLD AUTO: 1 % (ref 0–1)
BILIRUB SERPL-MCNC: 0.3 MG/DL (ref 0.2–1)
CALCIUM SERPL-MCNC: 9.1 MG/DL (ref 8.5–10.1)
CHLORIDE SERPL-SCNC: 108 MMOL/L (ref 98–107)
CREAT SERPL-MCNC: 0.81 MG/DL (ref 0.7–1.3)
EOSINOPHIL # BLD AUTO: 0.4 X10^3/UL (ref 0–0.4)
EOSINOPHIL NFR BLD AUTO: 6 % (ref 1–7)
ERYTHROCYTE [DISTWIDTH] IN BLOOD BY AUTOMATED COUNT: 14.9 % (ref 9.4–14.8)
LYMPHOCYTES # BLD AUTO: 2.1 X10^3/UL (ref 1–3.4)
LYMPHOCYTES NFR BLD AUTO: 30 % (ref 22–44)
MCH RBC QN AUTO: 30.2 PG (ref 27.5–34.5)
MCHC RBC AUTO-ENTMCNC: 33.4 G/DL (ref 33.2–36.2)
MD: NO
MONOCYTES # BLD AUTO: 0.6 X10^3/UL (ref 0.2–0.8)
MONOCYTES NFR BLD AUTO: 8 % (ref 2–9)
NEUTROPHILS # BLD AUTO: 4 X10^3/UL (ref 1.8–6.8)
NEUTROPHILS NFR BLD AUTO: 56 % (ref 42–75)
PLATELET # BLD AUTO: 313 X10^3/UL (ref 130–400)
PMV BLD AUTO: 9.1 FL (ref 7.4–10.4)
PROT SERPL-MCNC: 7.7 G/DL (ref 6.4–8.2)
RBC # BLD AUTO: 4.62 X10^6/UL (ref 4.38–5.82)
VANCOMYCIN TROUGH SERPL-MCNC: < 1.7 MG/DL (ref 2.8–20)

## 2021-01-15 PROCEDURE — 80053 COMPREHEN METABOLIC PANEL: CPT

## 2021-01-15 PROCEDURE — 85025 COMPLETE CBC W/AUTO DIFF WBC: CPT

## 2021-01-15 PROCEDURE — 80307 DRUG TEST PRSMV CHEM ANLYZR: CPT

## 2021-01-15 PROCEDURE — 36415 COLL VENOUS BLD VENIPUNCTURE: CPT

## 2021-01-15 PROCEDURE — G0480 DRUG TEST DEF 1-7 CLASSES: HCPCS

## 2021-01-15 PROCEDURE — 80320 DRUG SCREEN QUANTALCOHOLS: CPT

## 2021-01-15 PROCEDURE — 80329 ANALGESICS NON-OPIOID 1 OR 2: CPT

## 2021-01-15 PROCEDURE — 99285 EMERGENCY DEPT VISIT HI MDM: CPT

## 2021-01-15 PROCEDURE — 80299 QUANTITATIVE ASSAY DRUG: CPT

## 2021-01-15 NOTE — NUR
PT BIB EMS FOR [facility]. PT HAD ARGUMENT W PARENTS FOR WANTING A CIGARETTES. HELD A 
KITCHEN KNIFE TO WRIST. PT STATES HE WAS NOT GOING TO HARMSELF. WAS JUST 
SETTING A NOTION FOR HIS PARENTS. PT COMPLIANT WITH MEDICATIONS. PLACED ON L2K 
BY [employer]. PT HX OF SHIZOPHRENIA, DEPRESSION

## 2021-01-18 ENCOUNTER — HOSPITAL ENCOUNTER (EMERGENCY)
Dept: HOSPITAL 8 - ED | Age: 26
LOS: 1 days | Discharge: HOME | End: 2021-01-19
Payer: MEDICAID

## 2021-01-18 VITALS — WEIGHT: 214.95 LBS | HEIGHT: 76 IN | BODY MASS INDEX: 26.18 KG/M2

## 2021-01-18 VITALS — SYSTOLIC BLOOD PRESSURE: 148 MMHG | DIASTOLIC BLOOD PRESSURE: 86 MMHG

## 2021-01-18 DIAGNOSIS — M25.522: ICD-10-CM

## 2021-01-18 DIAGNOSIS — Z88.6: ICD-10-CM

## 2021-01-18 DIAGNOSIS — Z88.1: ICD-10-CM

## 2021-01-18 DIAGNOSIS — G89.11: Primary | ICD-10-CM

## 2021-01-18 PROCEDURE — 99284 EMERGENCY DEPT VISIT MOD MDM: CPT

## 2021-02-03 ENCOUNTER — HOSPITAL ENCOUNTER (EMERGENCY)
Dept: HOSPITAL 8 - ED | Age: 26
LOS: 1 days | Discharge: PSYCHIATRIC OR SUBSTANCE USE FACILITY | End: 2021-02-04
Payer: MEDICAID

## 2021-02-03 VITALS — WEIGHT: 220.46 LBS | BODY MASS INDEX: 26.85 KG/M2 | HEIGHT: 76 IN

## 2021-02-03 DIAGNOSIS — R45.850: ICD-10-CM

## 2021-02-03 DIAGNOSIS — R25.9: Primary | ICD-10-CM

## 2021-02-03 DIAGNOSIS — Z91.14: ICD-10-CM

## 2021-02-03 DIAGNOSIS — F17.200: ICD-10-CM

## 2021-02-03 PROCEDURE — 99285 EMERGENCY DEPT VISIT HI MDM: CPT

## 2021-02-03 PROCEDURE — 80320 DRUG SCREEN QUANTALCOHOLS: CPT

## 2021-02-03 PROCEDURE — 36415 COLL VENOUS BLD VENIPUNCTURE: CPT

## 2021-02-03 PROCEDURE — 80053 COMPREHEN METABOLIC PANEL: CPT

## 2021-02-03 PROCEDURE — 80299 QUANTITATIVE ASSAY DRUG: CPT

## 2021-02-03 PROCEDURE — 80307 DRUG TEST PRSMV CHEM ANLYZR: CPT

## 2021-02-03 PROCEDURE — 96372 THER/PROPH/DIAG INJ SC/IM: CPT

## 2021-02-03 PROCEDURE — 80329 ANALGESICS NON-OPIOID 1 OR 2: CPT

## 2021-02-03 PROCEDURE — G0480 DRUG TEST DEF 1-7 CLASSES: HCPCS

## 2021-02-03 PROCEDURE — 85025 COMPLETE CBC W/AUTO DIFF WBC: CPT

## 2021-02-04 VITALS — DIASTOLIC BLOOD PRESSURE: 69 MMHG | SYSTOLIC BLOOD PRESSURE: 104 MMHG

## 2021-02-04 LAB
ALBUMIN SERPL-MCNC: 4 G/DL (ref 3.4–5)
ALP SERPL-CCNC: 74 U/L (ref 45–117)
ALT SERPL-CCNC: 20 U/L (ref 12–78)
ANION GAP SERPL CALC-SCNC: 7 MMOL/L (ref 5–15)
BASOPHILS # BLD AUTO: 0.1 X10^3/UL (ref 0–0.1)
BASOPHILS NFR BLD AUTO: 1 % (ref 0–1)
BILIRUB SERPL-MCNC: 0.3 MG/DL (ref 0.2–1)
CALCIUM SERPL-MCNC: 9 MG/DL (ref 8.5–10.1)
CHLORIDE SERPL-SCNC: 110 MMOL/L (ref 98–107)
CREAT SERPL-MCNC: 0.85 MG/DL (ref 0.7–1.3)
EOSINOPHIL # BLD AUTO: 0.4 X10^3/UL (ref 0–0.4)
EOSINOPHIL NFR BLD AUTO: 5 % (ref 1–7)
ERYTHROCYTE [DISTWIDTH] IN BLOOD BY AUTOMATED COUNT: 14.7 % (ref 9.4–14.8)
LYMPHOCYTES # BLD AUTO: 2.5 X10^3/UL (ref 1–3.4)
LYMPHOCYTES NFR BLD AUTO: 31 % (ref 22–44)
MCH RBC QN AUTO: 30.1 PG (ref 27.5–34.5)
MCHC RBC AUTO-ENTMCNC: 33.2 G/DL (ref 33.2–36.2)
MD: NO
MONOCYTES # BLD AUTO: 0.7 X10^3/UL (ref 0.2–0.8)
MONOCYTES NFR BLD AUTO: 9 % (ref 2–9)
NEUTROPHILS # BLD AUTO: 4.5 X10^3/UL (ref 1.8–6.8)
NEUTROPHILS NFR BLD AUTO: 55 % (ref 42–75)
PLATELET # BLD AUTO: 279 X10^3/UL (ref 130–400)
PMV BLD AUTO: 9.1 FL (ref 7.4–10.4)
PROT SERPL-MCNC: 7.9 G/DL (ref 6.4–8.2)
RBC # BLD AUTO: 4.75 X10^6/UL (ref 4.38–5.82)
VANCOMYCIN TROUGH SERPL-MCNC: < 1.7 MG/DL (ref 2.8–20)

## 2021-02-04 NOTE — NUR
PT CONTINUES SLEEPING ON GURNEY CALMLY, NAD WITH EQUAL CHEST RISE/FALL, NO 
NEEDS AT THIS TIME, PT REMAINS IN SAFE ENVIRONMENT, SITTER IN VIEW.

## 2021-02-04 NOTE — NUR
PT RESTING ON GURNEY W/ GARAGE DOORS DOWNX2, SITTER OUTSIDE ROOM FOR SAFETY. 
RESP EVEN AND UNLABORED, NADN. AWAITING TRANSFER.

## 2021-02-04 NOTE — NUR
UPDATED PT STATUS TO KATIANA ([facility]), WILL CALL BACK AFTER CONSULTING WITH 
ACCEPTING MD.

## 2021-02-04 NOTE — NUR
RECEIVED REPORT FROM BEN. PT LAYING ON GURNEY SLEEPING CALMLY, NAD WITH 
EQUAL CHEST RISE/FALL, NO NEEDS AT THIS TIME, PT REMAINS IN SAFE ENVIRONMENT, 
SITTER IN VIEW.

## 2021-02-04 NOTE — NUR
PT CONTINUES SLEEPING ON GURNEY CALMLY, NAD WITH EQUAL CHEST RISE/FALL, COMFORT 
MEASURES PROVIDED, PT REMAINS IN SAFE ENVIRONMENT, SITTER IN VIEW.

## 2021-02-04 NOTE — NUR
PT BECAME VERY RESTLESS & MODERATELY ANXIOUS, PACING ROOM, PT NOT VIOLENT OR 
AGGRESSIVE AT THIS TIME BUT MAINTAINS INTENSE GAZE WHILE TALKING TO STAFF, PRN 
MED GIVEN PER EMAR/SANAZ ORDER FOR TRANSPORT, PT RELUNCTANT TO TAKE MED BUT 
AGREEABLE WITH SECURITY PRESENT (ALSO TO MAINTAIN STAFF SAFETY), UPDATED REPORT 
GIVEN TO NAYE ([facility]), PT TRANSPORTED VIA [facility] WITH ALL PERSONAL BELONGINGS & 
WITH STEADY GAIT, PT REFUSED LUNCH TRAY.

## 2021-02-04 NOTE — NUR
Patient ready for restraint discontinuation. Patient is calm and agrees to 
remain cooperative. Security assisted with d/c restraints. No incidents to 
report.

## 2021-02-04 NOTE — NUR
PT LAYING ON GURNEY SLEEPING CALMLY, NAD WITH EQUAL CHEST RISE/FALL, NO NEEDS 
AT THIS TIME, PT REMAINS IN SAFE ENVIRONMENT, SITTER IN VIEW.

## 2021-02-04 NOTE — NUR
Garage door x1 down, 2nd door open 2nd to need for pulse ox b/p. Security 
placed 4pt roni, checked by this RN; pos csmtp. All belonigns removed; 
jacket, pants, shoes and top labelled and placed in storage locker. Labs drawn 
and sent. Pt requested IM injection to left deltoid. 



L4 restraint sheet initiated. Per [employer] also pt goes from calm to very violent 
pretty quick. CN and supervisor aware of situation and need for 4pt roni.

## 2021-02-04 NOTE — NUR
Spoke with Rikki from [facility]. He states they are prepared to accept patient however 
due to their policy, patient cannot be accepted until four hours after 
discontinuation of restraints. Will reevaluate after 0740.

## 2021-02-04 NOTE — NUR
Patient resting in gurney. Respirations even and unlabored. Room secured, 
belongings locked in cabinet. Sitter outside.

## 2021-02-04 NOTE — NUR
Patient remains in four point restraints for patient safety. Patient restless 
on gurney, dozing intermittently. Room secured, belongings locked in cabinet.

## 2021-02-04 NOTE — NUR
Report received from YARIEL Pearce. This RN to assume care. Patient used urinal with 
tech assistance. He remains in 4 point restraints for safety.

## 2021-02-04 NOTE — NUR
TP RN: [facility] declining patient stating they recommend patient go to [facility].

## 2021-03-02 ENCOUNTER — HOSPITAL ENCOUNTER (EMERGENCY)
Dept: HOSPITAL 8 - ED | Age: 26
Discharge: LEFT BEFORE DISCHARGE | End: 2021-03-02
Payer: MEDICAID

## 2021-03-02 VITALS — WEIGHT: 205.03 LBS | HEIGHT: 76 IN | BODY MASS INDEX: 24.97 KG/M2

## 2021-03-02 VITALS — DIASTOLIC BLOOD PRESSURE: 50 MMHG | SYSTOLIC BLOOD PRESSURE: 101 MMHG

## 2021-03-02 DIAGNOSIS — F22: ICD-10-CM

## 2021-03-02 DIAGNOSIS — F20.9: Primary | ICD-10-CM

## 2021-03-02 LAB
ALBUMIN SERPL-MCNC: 4 G/DL (ref 3.4–5)
ANION GAP SERPL CALC-SCNC: 5 MMOL/L (ref 5–15)
BASOPHILS # BLD AUTO: 0.1 X10^3/UL (ref 0–0.1)
BASOPHILS NFR BLD AUTO: 2 % (ref 0–1)
CALCIUM SERPL-MCNC: 9.4 MG/DL (ref 8.5–10.1)
CHLORIDE SERPL-SCNC: 107 MMOL/L (ref 98–107)
CREAT SERPL-MCNC: 0.86 MG/DL (ref 0.7–1.3)
EOSINOPHIL # BLD AUTO: 0.3 X10^3/UL (ref 0–0.4)
EOSINOPHIL NFR BLD AUTO: 5 % (ref 1–7)
ERYTHROCYTE [DISTWIDTH] IN BLOOD BY AUTOMATED COUNT: 14.3 % (ref 9.4–14.8)
LYMPHOCYTES # BLD AUTO: 1.8 X10^3/UL (ref 1–3.4)
LYMPHOCYTES NFR BLD AUTO: 32 % (ref 22–44)
MCH RBC QN AUTO: 30.4 PG (ref 27.5–34.5)
MCHC RBC AUTO-ENTMCNC: 33.5 G/DL (ref 33.2–36.2)
MD: NO
MONOCYTES # BLD AUTO: 0.4 X10^3/UL (ref 0.2–0.8)
MONOCYTES NFR BLD AUTO: 8 % (ref 2–9)
NEUTROPHILS # BLD AUTO: 3 X10^3/UL (ref 1.8–6.8)
NEUTROPHILS NFR BLD AUTO: 53 % (ref 42–75)
PLATELET # BLD AUTO: 292 X10^3/UL (ref 130–400)
PMV BLD AUTO: 8.9 FL (ref 7.4–10.4)
RBC # BLD AUTO: 4.84 X10^6/UL (ref 4.38–5.82)
VANCOMYCIN TROUGH SERPL-MCNC: < 1.7 MG/DL (ref 2.8–20)

## 2021-03-02 PROCEDURE — 82040 ASSAY OF SERUM ALBUMIN: CPT

## 2021-03-02 PROCEDURE — 85025 COMPLETE CBC W/AUTO DIFF WBC: CPT

## 2021-03-02 PROCEDURE — 96372 THER/PROPH/DIAG INJ SC/IM: CPT

## 2021-03-02 PROCEDURE — 80299 QUANTITATIVE ASSAY DRUG: CPT

## 2021-03-02 PROCEDURE — 80307 DRUG TEST PRSMV CHEM ANLYZR: CPT

## 2021-03-02 PROCEDURE — 80320 DRUG SCREEN QUANTALCOHOLS: CPT

## 2021-03-02 PROCEDURE — 36415 COLL VENOUS BLD VENIPUNCTURE: CPT

## 2021-03-02 PROCEDURE — G0480 DRUG TEST DEF 1-7 CLASSES: HCPCS

## 2021-03-02 PROCEDURE — 99285 EMERGENCY DEPT VISIT HI MDM: CPT

## 2021-03-02 PROCEDURE — 80048 BASIC METABOLIC PNL TOTAL CA: CPT

## 2021-03-02 PROCEDURE — 80329 ANALGESICS NON-OPIOID 1 OR 2: CPT

## 2021-03-02 NOTE — NUR
-------------------------------------------------------------------------------

            *** Note tylor in EDM - 03/02/21 at 1859 by ALAN ***            

-------------------------------------------------------------------------------

Lights turned down per pt request, pt resting, eyes closed, breathing equal, 
non-labored. In view of sitter.

## 2021-03-02 NOTE — NUR
PATIENT RACING OUT OF ROOM AND CALLING PEOPLE, ASKING FOR CHIROPRACTOR AND VERY 
ANXIOUS, PACING ABOUT. WILL ASK FOR MEDS

## 2021-03-02 NOTE — NUR
Provided pt socks and new gown per pt request as his gown had some water spilt 
on it. Pt standing, stretching, remaining in room and cooperative.

## 2021-03-02 NOTE — NUR
PT NOT STAYING IN ROOM AND STANDING IN ALEJANDRO. PT WANTING TO TALK TO MD. TO SPEAK 
WITH MD ABOUT GETTING MEDS

## 2021-03-02 NOTE — NUR
ONCE SECURITY AT SIDE PT MEDICATED AS NOTED ON MAR WITH GEODON. PT PLACED IN 
TWO-POINT RESTRAINTS. TABITHA AT BEDSIDE INTERVIEWING PT. PT KEEPS LOOKING TO 
LEFT LIKE SOMEONE TALKING TO HIM. PT TOLD TABITHA HE BELIEVES I AM TRYING TO 
SEPARATE HIM FROM TABITHA. PT STATES HE HAS NOT BEEN TAKING HIS INVEGA BUT 
TAKING CLONAZEPAM AND EFFEXOR.

## 2021-03-02 NOTE — NUR
PATIENT ARRIVES WITH [facility] FROM HOME.  HE WAS PLACED ON HOLD BY MOST TEAM, HAS 
HX OF SCHITZOPHRENIA.  REPORTEDLY PATIENT WAS THREATENING FAMILY, AND RUNNING 
INTO TRAFFIC.  PATIENT HAS HISTORY OF SI.  PATIENT URINE COLLECTED, SITTER 
OUTSIDE ROOM, AND BELONGINGS LOCKED IN LOCKER, ONE BAG CLOTHES.  PATIENT 
BREATHLYZER WAS 0.0.

## 2021-03-02 NOTE — NUR
Pt requesting to have restraints loosened- this RN checked all restraints and 
they are securely but comfortably fitted to his wrists and ankles. Explained to 
pt that restraints need to stay on for his safety. Sitter at bedside. Pt 
requesting water, provided pt water.

## 2021-03-02 NOTE — NUR
Pt given 1mg Ativan, IM- pt compliant with medication administration. Geodon 
not given- will wait to discuss administration of it with psych NP as pt is 
currently in restraints and anxious but cooperative. Sitter at bedside. 4 point 
restraints in place. Offered pt a blanket, pt refused.

## 2021-03-02 NOTE — NUR
Pt still in 4 point restraints, sitter at bedside. Pt requested water, sitting 
up bed and assisting pt with drinking.

## 2021-03-02 NOTE — NUR
pt hs been returned to department by PARESH.  per report, pt was located walking 
a few blocks from the hospital



security present upon arrival.  pt has been placed into restraints.  pt is 
agitated but cooperative with assessment and questions



when asked pt why he left the department, pt states that he wanted to go home 
to study.  attempted to re-orient pt to situation.  pt is unaware of the day or 
date.  when asked pt what the current month is, pt reports January (it is 
March).  pt is aware that we are in [place] and that he is currently in [facility] but when asked if he know who the president is, he did not know 
at first and then said manuel Beatty



attempted to re-orient pt to legal hold and current plan of care, pt began 
talking about arguing with his mother PTA about losing a marijuana cigarette



pt has no obvious injuries and denies pain at this time.  pt reports that he 
smoked a cigarette when he was gone



Chris, psych GIFTY s at bedside for eval and to update pt on POC.

## 2021-03-02 NOTE — NUR
TABITHA INFORMED PT THAT HE WILL REMAIN ON HOLD AND THAT WE WILL WORK ON GETTING 
HIM PLACED AT [facility] OR [facility]. PT STATES HE WILL NOT TRY TO LEAVE 
IF RESTRAINTS REMOVED. ADDITIONALLY MEDICATED FOR ANXIETY AS NOTED ON MAR. 
SECURITY TO REMOVE RESTRAINTS.

## 2021-03-02 NOTE — NUR
-------------------------------------------------------------------------------

            *** Note undone in [facility] - 03/02/21 at 1507 by ALAN ***            

-------------------------------------------------------------------------------

Report from YARIEL Desai. Assumed care.

## 2021-03-02 NOTE — NUR
PT REMAINS IN DIRECT VIEW OF SITTER AND RESTRAINTS IN PLACE.  PT WANTS OUT OF 
RESTRAINTS BUT EXPLAINED TO PT THAT I AM NOT COMFORTABLE WITH IT. PT IS FLIGHT 
RISK AND WANTS TO LEAVE AND REMAINS ON HOLD

## 2021-03-02 NOTE — NUR
WHILE LOOKING FOR PA/MD FOR PSYCH MEDS, PATIENT RAN OUT OF ROOM AND RAN OUT OF 
DEPARTMENT...



SECURITY IS LOOKING FOR PATIENT.  REPORT TO SHERRI.

## 2021-03-21 ENCOUNTER — HOSPITAL ENCOUNTER (EMERGENCY)
Dept: HOSPITAL 8 - ED | Age: 26
Discharge: HOME | End: 2021-03-21
Payer: MEDICAID

## 2021-03-21 VITALS — DIASTOLIC BLOOD PRESSURE: 58 MMHG | SYSTOLIC BLOOD PRESSURE: 106 MMHG

## 2021-03-21 VITALS — BODY MASS INDEX: 29.56 KG/M2 | WEIGHT: 218.26 LBS | HEIGHT: 72 IN

## 2021-03-21 DIAGNOSIS — Z88.1: ICD-10-CM
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
## 2021-03-21 DIAGNOSIS — Z88.8: ICD-10-CM

## 2021-03-21 DIAGNOSIS — F20.9: ICD-10-CM

## 2021-03-21 DIAGNOSIS — F10.120: Primary | ICD-10-CM

## 2021-03-21 PROCEDURE — 99283 EMERGENCY DEPT VISIT LOW MDM: CPT

## 2021-03-21 NOTE — NUR
MARIA DE JESUS RN:REHANA YODER FOR INTOXICATION OF ETOH AND MARIJUANA, UNABLE TO WALK, 
APPLIED MONITOR BP/PULSE OX, SIDE RAILS UP, ORIENTED/INSTRUCTED PT TO USE CALL 
LIGHT ON LAP, CELL PHONE ON BS TABLE. URINAL PLACED ON SIDE RAIL. NO 
INTERVENTIONS BY PARESH. POC EXPLAINED TO PT.

## 2021-03-21 NOTE — NUR
PATIENT STATING THAT "I JUST WANT TO GO HOME". PATIENT REPORTS THAT HE HAS 
SOMEONE THAT COULD COME PICK HIM UP "BUT THEY ARE MAD AT ME"

## 2021-03-21 NOTE — NUR
PATIENT RESTING IN BED IN NAD. AROUSES TO VERBAL STIMULI. CALL DEVI IN REACH. 
URINAL IN REACH. SAFETY MAINTAINED. BEDRAILS UP FOR SAFETY. NO IMPULSIVE 
ACTIVITY AT THIS TIME. VS REMAIN STABLE ON RA. WILL CONTINUE TO MONITOR.

## 2021-03-21 NOTE — NUR
dsicharge instructions reviewed with patient. patient acknowledges 
understanding on need to stop drinking alcohol and provided taxi voucher 
directly to his home address. i was unable to get in contact with his mother 
for a ride home. no IV placed during this ER visit. patient provided addiction 
facility resources. these were reviewed with him as well. 

-------------------------------------------------------------------------------

Addendum: 03/21/21 at 2121 by ADGUANACOY

-------------------------------------------------------------------------------

all personal belongings with patient on discharge

## 2021-03-21 NOTE — NUR
patient continues to come out of room stating "im leaving". RN asked patient to 
go back to room. patient complied with this request. steady gait. calm. 
cooperative. unable to get ahold of mother with number patient supplied me 
with. patient states his phone . i discussed plan to dc with primary MD and 
RN and MD agreed patient would be more safe to dc home with taxi voucher than 
to walk out. 



no IV placed during this ER visit

## 2021-08-23 ENCOUNTER — HOSPITAL ENCOUNTER (EMERGENCY)
Dept: HOSPITAL 8 - ED | Age: 26
LOS: 1 days | Discharge: PSYCHIATRIC OR SUBSTANCE USE FACILITY | End: 2021-08-24
Payer: MEDICAID

## 2021-08-23 VITALS — BODY MASS INDEX: 28.28 KG/M2 | HEIGHT: 72 IN | WEIGHT: 208.78 LBS

## 2021-08-23 DIAGNOSIS — F22: ICD-10-CM

## 2021-08-23 DIAGNOSIS — F20.9: ICD-10-CM

## 2021-08-23 DIAGNOSIS — Z91.14: ICD-10-CM

## 2021-08-23 DIAGNOSIS — F29: Primary | ICD-10-CM

## 2021-08-23 LAB
ALBUMIN SERPL-MCNC: 3.7 G/DL (ref 3.4–5)
ALP SERPL-CCNC: 64 U/L (ref 45–117)
ALT SERPL-CCNC: 22 U/L (ref 12–78)
ANION GAP SERPL CALC-SCNC: 8 MMOL/L (ref 5–15)
BASOPHILS # BLD AUTO: 0.1 X10^3/UL (ref 0–0.1)
BASOPHILS NFR BLD AUTO: 1 % (ref 0–1)
BILIRUB SERPL-MCNC: 0.2 MG/DL (ref 0.2–1)
CALCIUM SERPL-MCNC: 9.6 MG/DL (ref 8.5–10.1)
CHLORIDE SERPL-SCNC: 108 MMOL/L (ref 98–107)
CREAT SERPL-MCNC: 0.68 MG/DL (ref 0.7–1.3)
EOSINOPHIL # BLD AUTO: 0.2 X10^3/UL (ref 0–0.4)
EOSINOPHIL NFR BLD AUTO: 2 % (ref 1–7)
ERYTHROCYTE [DISTWIDTH] IN BLOOD BY AUTOMATED COUNT: 14.3 % (ref 9.4–14.8)
LYMPHOCYTES # BLD AUTO: 1.9 X10^3/UL (ref 1–3.4)
LYMPHOCYTES NFR BLD AUTO: 26 % (ref 22–44)
MCH RBC QN AUTO: 30.7 PG (ref 27.5–34.5)
MCHC RBC AUTO-ENTMCNC: 33.4 G/DL (ref 33.2–36.2)
MONOCYTES # BLD AUTO: 0.4 X10^3/UL (ref 0.2–0.8)
MONOCYTES NFR BLD AUTO: 6 % (ref 2–9)
NEUTROPHILS # BLD AUTO: 4.7 X10^3/UL (ref 1.8–6.8)
NEUTROPHILS NFR BLD AUTO: 65 % (ref 42–75)
PLATELET # BLD AUTO: 254 X10^3/UL (ref 130–400)
PMV BLD AUTO: 9.4 FL (ref 7.4–10.4)
PROT SERPL-MCNC: 7.6 G/DL (ref 6.4–8.2)
RBC # BLD AUTO: 5.01 X10^6/UL (ref 4.38–5.82)
VANCOMYCIN TROUGH SERPL-MCNC: < 1.7 MG/DL (ref 2.8–20)

## 2021-08-23 PROCEDURE — 80320 DRUG SCREEN QUANTALCOHOLS: CPT

## 2021-08-23 PROCEDURE — 80329 ANALGESICS NON-OPIOID 1 OR 2: CPT

## 2021-08-23 PROCEDURE — 36415 COLL VENOUS BLD VENIPUNCTURE: CPT

## 2021-08-23 PROCEDURE — 80053 COMPREHEN METABOLIC PANEL: CPT

## 2021-08-23 PROCEDURE — 96372 THER/PROPH/DIAG INJ SC/IM: CPT

## 2021-08-23 PROCEDURE — 99285 EMERGENCY DEPT VISIT HI MDM: CPT

## 2021-08-23 PROCEDURE — 80307 DRUG TEST PRSMV CHEM ANLYZR: CPT

## 2021-08-23 PROCEDURE — 99283 EMERGENCY DEPT VISIT LOW MDM: CPT

## 2021-08-23 PROCEDURE — 85025 COMPLETE CBC W/AUTO DIFF WBC: CPT

## 2021-08-23 PROCEDURE — 80299 QUANTITATIVE ASSAY DRUG: CPT

## 2021-08-23 PROCEDURE — G0480 DRUG TEST DEF 1-7 CLASSES: HCPCS

## 2021-08-23 NOTE — NUR
room secured and sitter present for safety.



pt notebook and cell phone secured.  pt refuses to change clothing

## 2021-08-23 NOTE — NUR
PATIENT REQUESTING TO GO TO THE BATHROOM. RN TO BEDSIDE TO EXPLAIN THAT HE IS 
UNSAFE TO COME OUT OF RESTRAINTS AT THIS TIME BUT I CAN ASSIST WITH A URINAL. 
PATIENT YELLING AT THIS RN STATING I CANT REFUSE HIM THE BATHROOM. PATIENT 
CONTINUED TO BE VERBALLY AGRESSIVE AT THIS RN. PATIENT AGREEABLE TO ALLOWING 
THIS RN TO HELP HIM WITH URINAL. WILL CONTINUE TO MONITOR. Tissue Cultured Epidermal Autograft Text: The defect edges were debeveled with a #15 scalpel blade.  Given the location of the defect, shape of the defect and the proximity to free margins a tissue cultured epidermal autograft was deemed most appropriate.  The graft was then trimmed to fit the size of the defect.  The graft was then placed in the primary defect and oriented appropriately.

## 2021-08-23 NOTE — NUR
pt continues to be agitated and paranoid.  states "black magic has changed my 
eyes"  pt states "I emand a .  call the police!  I need themhere with me" 
 attempting to re-orient patient to POC and situation.  pt continues to refuse 
lab draw.  pt states "I know my rights!  you can't draw my blood.  I gave you a 
urine sample and that is enough.  You must be so sad that you are a bitch"



patient in no respiratory distress.  



room secure.  sitter present for safety

## 2021-08-23 NOTE — NUR
pt joseph become increasingly verbally aggressive.  pt continues to be 
uncooperative with assessment.  



when asked any question, pt responds "bitch".  pt states "you wanna fuck me?"  
attempting repeated re-orientation, pt states "black magic is happening around 
me"



pt refuses to undress and change into gown.  pt repeatedly swearing at staff 
and is making inappropriate statements to this RN and other staff members



security at [facility] has placed pt in restraints for safety

## 2021-08-23 NOTE — NUR
pt in no apparent respiratory distress.  



room secure, sitte present for safety



report to Mckayla SALDIVAR

## 2021-08-23 NOTE — NUR
lab has been to bedside to attempt draw.  pt refuses.  pt continues to be 
agitated and paranoid.  pt yelling at lab staff.  lab draw not completed at 
this time

## 2021-08-23 NOTE — NUR
FIRST CONTACT WITH PATIENT. PATIENT LYING IN STRETCHER WITH EYES CLOSED. NAD. 
ROOM SECURE. WILL CONTINUE TO MONITOR.

## 2021-08-23 NOTE — NUR
-------------------------------------------------------------------------------

          *** Note undone in [facility] - 08/23/21 at 2316 by POPEYE ***          

-------------------------------------------------------------------------------

MD AWARE PATIENT IS NOT OUT OF RESTRAINTS.

## 2021-08-23 NOTE — NUR
PATIENT AGREEABLE TO BLOODWORK AT THIS TIME. PATIENT REMAINED CALM THROUGHOUT 
PROCEDURE. PATIENT REQUESTING TO COME OUT OF RESTRAINTS. ADVISED PATIENT WE 
WILL REMOVE ONE LIMB AT A TIME FOR SAFETY. PATIENT AGREEABLE TO THE PLAN AT 
THIS TIME.

## 2021-08-23 NOTE — NUR
SPOKE WITH MD LOPEZ ABOUT ADMIN INVEGA AT THIS TIME. OK TO GIVE NOW. UPDATED MD 
ON RESTRAINT STATUS.

## 2021-08-23 NOTE — NUR
R WRIST RESTRAINT REMOVED. BELT REMOVED AS WELL. PATIENT ATTEMPTED TO GRAB BELT 
OUT OF RN HAND DURING REMOVAL. PATIENT PROVIDED WITH ICE WATER.

## 2021-08-23 NOTE — NUR
pt continues to be agitated and verbally aggressive.  pt states "I will run!"



pt medicated per order for agitattion.  pt assisted with urinal by Patrick SALDIVAR.  
attempting to re-orient pt regarding POC.  pt continues to refuse lab draw.  pt 
yelling "get me a !"



room secure.  sitter present for safety

## 2021-08-23 NOTE — NUR
pt BIB [facility] from home on a legal hold that was placed PTA by [employer].  



per report, pt has a hx of TBI and schizophrenia and has been becoming 
increasingly dangerouos to family members at home.  pt has been throwing 
furniture and turning on the gas burners and leaving them on.  pt also admits 
to meth use, but is non-complaint with psych medications.



upon admit, pt is restrained on PARESH patel as he was in handcuffs on scene 
PTA and very agitated.



pt agitated and anxious.  has a notebook with him that he keeps flipping the 
pages on.  



pt uncooperative with questionsing and assessment



pt has no obvious injury.  OCONNELL.  no apparent resp. distress



no family present

## 2021-08-23 NOTE — NUR
pt continues to be agitated and paranoid.  no apparent respiratory distress



room secure.  sitter present for safety

## 2021-08-23 NOTE — NUR
PATIENT PROVIDED WITH BLANKETS AS REQUESTED. PATIENT STATES "I AM GETTING THE 
FLU AS I LAY HERE AND FREEZE." PATIENT STATES HE NEEDS TO GO AT THIS TIME TO 
TAKE CARE OF SOME BUSINESS. PATIENT ADIVSED ON A LEGAL HOLD AT THIS TIME AND HE 
WILL REMAIN HERE FOR SAFETY. PATIENT DID NOT RESPOND. 1:1 SITTER REMAINS IN 
PLACE. ROOM REMAINS SECURE. LOCKED RESTRAINTS REMAIN IN PLACE. WILL CONTINUE TO 
ASSESS REMOVAL OF ALL RESTRAINTS. WILL CONTINUE TO MONITOR PATIENT.

## 2021-08-23 NOTE — NUR
PATIENT LYING IN STRETCHER WITH EYES CLOSED. APPEARS CALM. EXPLAINED ATTEMPT TO 
REMOVE ANOTHER RESTRAINT. PATIENT VERBALIZED UNDERSTANDING ASKING "YES PLEASE." 
WILL CONTINUE TO MONITOR.

## 2021-08-24 VITALS — DIASTOLIC BLOOD PRESSURE: 78 MMHG | SYSTOLIC BLOOD PRESSURE: 115 MMHG

## 2021-08-24 NOTE — NUR
PATIENT LYING IN STRETCHER WITH EYES CLOSED. NAD. 1:1 SITTER REMAINS. ROOM 
REMAINS SECURE. WILL CONTINUE TO MONITOR.

## 2021-08-24 NOTE — NUR
REPORT FROM YARIEL DUNCAN. PT RESTING IN [facility], EYES CLOSED, NADN AT THIS TIME, 
TM.

## 2021-08-24 NOTE — NUR
Patient resting with eyes closed, bilateral chest rise and fall, no apparent 
distress.  Continue to monitor patients condition with sitter at bedside.

## 2021-08-24 NOTE — NUR
Patient resting with eyes closed, bilateral chest rise and fall, no apparent 
distress noted.  Continue to monitor patients condition with sitter at bedside.

## 2021-08-24 NOTE — NUR
report to orin roberts at [facility], pt awaiting transport by [facility] to 7am

## 2021-10-19 ENCOUNTER — HOSPITAL ENCOUNTER (EMERGENCY)
Facility: MEDICAL CENTER | Age: 26
End: 2021-10-19
Attending: EMERGENCY MEDICINE
Payer: MEDICAID

## 2021-10-19 VITALS
TEMPERATURE: 98.1 F | SYSTOLIC BLOOD PRESSURE: 115 MMHG | WEIGHT: 201.72 LBS | RESPIRATION RATE: 16 BRPM | HEART RATE: 108 BPM | BODY MASS INDEX: 24.56 KG/M2 | OXYGEN SATURATION: 98 % | DIASTOLIC BLOOD PRESSURE: 60 MMHG | HEIGHT: 76 IN

## 2021-10-19 DIAGNOSIS — F29 PSYCHOSIS, UNSPECIFIED PSYCHOSIS TYPE (HCC): ICD-10-CM

## 2021-10-19 LAB
AMPHET UR QL SCN: POSITIVE
BARBITURATES UR QL SCN: NEGATIVE
BENZODIAZ UR QL SCN: NEGATIVE
BZE UR QL SCN: NEGATIVE
CANNABINOIDS UR QL SCN: POSITIVE
ETHANOL BLD-MCNC: <10.1 MG/DL (ref 0–10)
METHADONE UR QL SCN: NEGATIVE
OPIATES UR QL SCN: NEGATIVE
OXYCODONE UR QL SCN: NEGATIVE
PCP UR QL SCN: NEGATIVE
PROPOXYPH UR QL SCN: NEGATIVE

## 2021-10-19 PROCEDURE — 82077 ASSAY SPEC XCP UR&BREATH IA: CPT

## 2021-10-19 PROCEDURE — 80307 DRUG TEST PRSMV CHEM ANLYZR: CPT

## 2021-10-19 PROCEDURE — 90791 PSYCH DIAGNOSTIC EVALUATION: CPT

## 2021-10-19 PROCEDURE — 700111 HCHG RX REV CODE 636 W/ 250 OVERRIDE (IP)

## 2021-10-19 PROCEDURE — 96372 THER/PROPH/DIAG INJ SC/IM: CPT

## 2021-10-19 PROCEDURE — 99285 EMERGENCY DEPT VISIT HI MDM: CPT

## 2021-10-19 RX ORDER — CLONAZEPAM 0.5 MG/1
2 TABLET ORAL ONCE
Status: DISCONTINUED | OUTPATIENT
Start: 2021-10-19 | End: 2021-10-19 | Stop reason: HOSPADM

## 2021-10-19 RX ORDER — LORAZEPAM 2 MG/ML
INJECTION INTRAMUSCULAR
Status: COMPLETED
Start: 2021-10-19 | End: 2021-10-19

## 2021-10-19 RX ORDER — HALOPERIDOL 5 MG/ML
INJECTION INTRAMUSCULAR
Status: COMPLETED
Start: 2021-10-19 | End: 2021-10-19

## 2021-10-19 RX ORDER — DIPHENHYDRAMINE HYDROCHLORIDE 50 MG/ML
INJECTION INTRAMUSCULAR; INTRAVENOUS
Status: COMPLETED
Start: 2021-10-19 | End: 2021-10-19

## 2021-10-19 RX ADMIN — HALOPERIDOL LACTATE 5 MG: 5 INJECTION, SOLUTION INTRAMUSCULAR at 04:30

## 2021-10-19 RX ADMIN — DIPHENHYDRAMINE HYDROCHLORIDE 50 MG: 50 INJECTION, SOLUTION INTRAMUSCULAR; INTRAVENOUS at 04:30

## 2021-10-19 RX ADMIN — LORAZEPAM 2 MG: 2 INJECTION INTRAMUSCULAR; INTRAVENOUS at 04:30

## 2021-10-19 NOTE — ED NOTES
Report received from HERMILA Rodriguez.    Patient sleeping on gurney in R lateral position. No acute distress. Active chest rise and fall noted. No agitation noted. No behavioral pain indicators noted. 1:1 observation maintained by sitter at bedside.

## 2021-10-19 NOTE — ED NOTES
Med Rec complete per pt's mother  Allergies Reviewed    Pt's mother reports that she does not believe pt has taken medications since leaving a group home months ago

## 2021-10-19 NOTE — ED TRIAGE NOTES
"Chief Complaint   Patient presents with   • Addiction Problem   • Psych Eval     PT was found walking along the [place] in dark clothes and reported to have done a large amount of meth.     Blood Pressure 119/82   Pulse 96   Temperature 37.1 °C (98.7 °F) (Temporal)   Respiration 18   Height 1.93 m (6' 3.98\")   Weight 91.5 kg (201 lb 11.5 oz)   Oxygen Saturation 96%   Body Mass Index 24.56 kg/m²     "

## 2021-10-19 NOTE — ED NOTES
Patient offered lunch tray. States that he does not want food at this time. Will save for later. 1:1 observation maintained by sitter at bedside.

## 2021-10-19 NOTE — ED NOTES
Patient's home medications have been reviewed by the pharmacy team.     Patient in ED for psych evaluation and meth overuse.    Past Medical History:   Diagnosis Date   • Psychiatric disorder     schizophrenia   • TBI (traumatic brain injury) ([facility])        Patient's Medications   New Prescriptions    No medications on file   Previous Medications    No medications on file   Modified Medications    No medications on file   Discontinued Medications    QUETIAPINE (SEROQUEL) 300 MG TABLET    Take 1 Tab by mouth every evening.    QUETIAPINE (SEROQUEL) 50 MG TABLET    Take 1 Tab by mouth 2 Times a Day.          A:  Medications do not appear to be contributing to current complaints. Patient is not taking home medications per mother.      P:    No recommendations at this time.       Nasreen Delgadillo, Pharmacy Intern

## 2021-10-19 NOTE — ED PROVIDER NOTES
"ED Provider Note    CHIEF COMPLAINT  Chief Complaint   Patient presents with   • Addiction Problem   • Psych Eval     PT was found walking along the [facility] in dark clothes and reported to have done a large amount of meth.       REBECCA Masters is a 26 y.o. male who presents to the emergency department [place] the [place].  He was not acting right when approached by Highway Patrol.  He admitted to using methamphetamine.  He has been intermittently agitated.  Occasionally he is able to be talked to down.  He denies any injury.  No fever, abdominal pain, chest pain, shortness of breath but is not a good historian.      REVIEW OF SYSTEMS  As above, unable to obtain otherwise    PAST MEDICAL HISTORY  Past Medical History:   Diagnosis Date   • Psychiatric disorder     schizophrenia   • TBI (traumatic brain injury) ([facility])        Family history  No pertinent family medical history    SOCIAL HISTORY  Social History     Tobacco Use   • Smoking status: Current Every Day Smoker   Vaping Use   • Vaping Use: Unknown   Substance Use Topics   • Alcohol use: Yes   • Drug use: Yes     Comment: [facility]       SURGICAL HISTORY  No past surgical history on file.    CURRENT MEDICATIONS  Home Medications    **Home medications have not yet been reviewed for this encounter**         ALLERGIES  Allergies   Allergen Reactions   • Beef-Derived Products    • Doxycycline    • Haldol [Haloperidol]    • Pork Derived Products    • Penicillins        PHYSICAL EXAM  VITAL SIGNS: /82   Pulse 96   Temp 37.1 °C (98.7 °F) (Temporal)   Resp 18   Ht 1.93 m (6' 3.98\")   Wt 91.5 kg (201 lb 11.5 oz)   SpO2 96%   BMI 24.56 kg/m²    Constitutional: Awake and alert.  Eyes open wide.  Agitated HENT: Normal inspection  Eyes: Normal inspection  Neck: Grossly normal range of motion.  Cardiovascular: Normal heart rate, Normal rhythm.  Symmetric peripheral pulses.   Thorax & Lungs: No respiratory distress, No wheezing, No rales, No rhonchi, " No chest tenderness.   Abdomen: Bowel sounds normal, soft, non-distended, nontender, no mass  Skin: No obvious rash.  Extremities: Asymmetric swelling  Neurologic: Grossly normal   Psychiatric: Anxious        Medications   clonazePAM (KLONOPIN) tablet 2 mg (2 mg Oral Refused 10/19/21 0430)   LORAZEPAM 2 MG/ML INJ SOLN (2 mg Intramuscular Given 10/19/21 0430)   HALOPERIDOL LACTATE 5 MG/ML INJ SOLN (5 mg Intramuscular Given 10/19/21 0430)   DIPHENHYDRAMINE HCL 50 MG/ML INJ SOLN (50 mg Intramuscular Given 10/19/21 0430)       COURSE & MEDICAL DECISION MAKING  The patient presents agitated.  He had security at the bedside.  He appeared scared.  Initially he agreed to take Klonopin and this was ordered but later he refused.  We were worried that his agitation was going to escalate and he could potentially hurt someone or hurt himself.  He was given Haldol, Benadryl and Ativan.  Will need further work-up.  Patient will need to be observed in the emergency department to determine if his mental status clears after metabolizing methamphetamine.    Medical record is reviewed.  Patient was at [facility] on 10/1 for psychosis to waiting down the police standing on an overpass.  He was placed on a legal hold at that time..    Admit to ED observation at 4:40 AM.  10/19/2021    Patient was appropriately sedated.  He was reassessed.  Gradually mental status improved and he could wake up but he was still psychotic.  No evidence of injury.  Vital signs stable.  Completed legal hold.  Patient is unable to care for self.  Consulted behavioral health.  Agree with need for inpatient psychiatric care.      FINAL IMPRESSION  1.  Acute psychosis  2.  History of schizophrenia  3.  Substance abuse disorder    CRITICAL CARE TIME 30 minutes  There was a very real possibility of deterioration of the patient's condition.  This patient required the highest level of care.  I provided critical care services which included: review of the medical  record, treatment orders, ordering and reviewing test results, frequent reevaluation of the patient's condition and response to treatment, as well as discussing the case with appropriate personnel and various consultants. The critical care time associated with the care of this patient is exclusive of any procedures or specific interventions.      This dictation was created using voice recognition software. The accuracy of the dictation is limited to the abilities of the software.  The nursing notes were reviewed and certain aspects of this information were incorporated into this note.      Electronically signed by: Donnie Fitzgerald M.D., 10/19/2021 4:38 AM

## 2021-10-19 NOTE — ED NOTES
PT medicated as ordered after PT was placed in 4 Point hard restraints and co operated after the restraints where applied.

## 2021-10-19 NOTE — ED NOTES
Unable to complete med rec at this time   Patient unable to participate in an interview at this time   No pharmacy on file

## 2021-10-19 NOTE — ED NOTES
Patient ambulatory to and from BR with steady gait under constant supervision by ED RN. Urine specimen provided by patient sent to lab per orders. Patient returned to room and snack provided per request. Conversing appropriately with staff during encounter. 1:1 observation maintained by sitter at bedside.

## 2021-10-19 NOTE — ED NOTES
Patient sleeping on gurney in supine position. No acute distress. Active chest rise and fall noted. No agitation noted. No behavioral pain indicators noted. 1:1 observation maintained by sitter at bedside.

## 2021-10-19 NOTE — ED NOTES
Patient sleeping on gurney in L lateral position. No acute distress. Active chest rise and fall noted. No agitation noted. No behavioral pain indicators noted. 1:1 observation maintained by sitter at bedside.
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
## 2021-10-19 NOTE — ED NOTES
Received report from Calderon vora  Pt resting yuliet, chest rise/fall noted.  Sitter at bedside, report give .

## 2021-10-19 NOTE — DISCHARGE PLANNING
Alert Team  Consult order noted.  Pt not yet ready for consult; no etoh level was done overnight.  AM RN aware and working to complete.  Pt is on LH per [employer] for inability to care for self and I am familiar with him from past encounters; hx of schizophrenia and meth use.  JOY

## 2021-10-19 NOTE — DISCHARGE PLANNING
Medical Social Work    Referral: Legal hold Transfer to Mental Health Facility    Intervention: SW received call from [facility] stating that they have accepted the patient for admission.     Pt's accepting physcian is Dr. Gustafson.     TRENTON arranged for transportation to be set up through [facility]    The pt will be picked up at 1700.     TRENTON notified the RN of the departure time as well as accepting facility.     TRENTON created transfer packet and placed on chart.     Plan: Pt will transfer back to [facility] at 1700.     no

## 2021-10-19 NOTE — DISCHARGE PLANNING
Medical Social Work    Referral: Legal Hold    Intervention: Legal Hold Paperwork given to SW by [facility] RN: Alla.     Legal Hold Initiated: Date: 10/19/2021             Time: 0335    Legal Hold faxed: Date: 10/19/2021                Time: 1300    Patient's Insurance Listed on Face Sheet: [place] Medicaid     Referrals sent to: [facility], [facility] and [facility]    Plan: Patient will transfer to mental health facility once acceptance is obtained.

## 2021-10-20 NOTE — ED NOTES
Patient sleeping on gurney in R lateral position. No acute distress. Active chest rise and fall noted. No agitation noted. No behavioral pain indicators noted. 1:1 observation maintained by sitter at bedside.

## 2021-10-20 NOTE — ED NOTES
Patient discharged to [facility] in stable condition via [facility] transport. Belongings bag x 1 removed from [employer] and sent with patient. Ambulatory from ED with steady gait.

## 2021-11-13 ENCOUNTER — HOSPITAL ENCOUNTER (EMERGENCY)
Facility: MEDICAL CENTER | Age: 26
End: 2021-11-13
Attending: EMERGENCY MEDICINE
Payer: MEDICAID

## 2021-11-13 ENCOUNTER — APPOINTMENT (OUTPATIENT)
Dept: RADIOLOGY | Facility: MEDICAL CENTER | Age: 26
End: 2021-11-13
Attending: EMERGENCY MEDICINE
Payer: MEDICAID

## 2021-11-13 VITALS
TEMPERATURE: 97.8 F | WEIGHT: 200 LBS | OXYGEN SATURATION: 95 % | BODY MASS INDEX: 24.36 KG/M2 | HEIGHT: 76 IN | SYSTOLIC BLOOD PRESSURE: 118 MMHG | RESPIRATION RATE: 18 BRPM | DIASTOLIC BLOOD PRESSURE: 74 MMHG | HEART RATE: 80 BPM

## 2021-11-13 DIAGNOSIS — Z86.59 HISTORY OF SCHIZOPHRENIA: ICD-10-CM

## 2021-11-13 DIAGNOSIS — F12.90 MARIJUANA USE: ICD-10-CM

## 2021-11-13 DIAGNOSIS — Z78.9 UNABLE TO CARE FOR SELF: ICD-10-CM

## 2021-11-13 LAB
AMPHET UR QL SCN: NEGATIVE
BARBITURATES UR QL SCN: NEGATIVE
BENZODIAZ UR QL SCN: NEGATIVE
BZE UR QL SCN: NEGATIVE
CANNABINOIDS UR QL SCN: POSITIVE
FLUAV RNA SPEC QL NAA+PROBE: NEGATIVE
FLUBV RNA SPEC QL NAA+PROBE: NEGATIVE
METHADONE UR QL SCN: NEGATIVE
OPIATES UR QL SCN: NEGATIVE
OXYCODONE UR QL SCN: NEGATIVE
PCP UR QL SCN: NEGATIVE
POC BREATHALIZER: 0 PERCENT (ref 0–0.01)
PROPOXYPH UR QL SCN: NEGATIVE
RSV RNA SPEC QL NAA+PROBE: NEGATIVE
SARS-COV-2 RNA RESP QL NAA+PROBE: NOTDETECTED
SPECIMEN SOURCE: NORMAL

## 2021-11-13 PROCEDURE — 71045 X-RAY EXAM CHEST 1 VIEW: CPT

## 2021-11-13 PROCEDURE — 0241U HCHG SARS-COV-2 COVID-19 NFCT DS RESP RNA 4 TRGT MIC: CPT

## 2021-11-13 PROCEDURE — C9803 HOPD COVID-19 SPEC COLLECT: HCPCS | Performed by: EMERGENCY MEDICINE

## 2021-11-13 PROCEDURE — 99285 EMERGENCY DEPT VISIT HI MDM: CPT

## 2021-11-13 PROCEDURE — 90791 PSYCH DIAGNOSTIC EVALUATION: CPT | Performed by: PSYCHOLOGIST

## 2021-11-13 PROCEDURE — 302970 POC BREATHALIZER: Performed by: EMERGENCY MEDICINE

## 2021-11-13 PROCEDURE — 80307 DRUG TEST PRSMV CHEM ANLYZR: CPT

## 2021-11-13 RX ORDER — MORPHINE SULFATE 4 MG/ML
4 INJECTION, SOLUTION INTRAMUSCULAR; INTRAVENOUS ONCE
Status: DISCONTINUED | OUTPATIENT
Start: 2021-11-13 | End: 2021-11-13 | Stop reason: CLARIF

## 2021-11-13 RX ORDER — PROCHLORPERAZINE EDISYLATE 5 MG/ML
10 INJECTION INTRAMUSCULAR; INTRAVENOUS ONCE
Status: DISCONTINUED | OUTPATIENT
Start: 2021-11-13 | End: 2021-11-13 | Stop reason: CLARIF

## 2021-11-13 NOTE — ED NOTES
Med rec updated and complete. Allergies reviewed. Pt denies taking medications.      Home pharmacy [facility] [phone]

## 2021-11-13 NOTE — ED NOTES
Pt resting in [facility]. Legal 2000 hold. In full view of 1:1 sitter at all times. No acute distress noted.

## 2021-11-13 NOTE — ED PROVIDER NOTES
ED PROVIDER NOTE    Scribed for Joe Horan M.D. by Poornima Lama. 11/13/2021, 3:40 PM.    This is an addendum to the note on Donald Masters. For further details and full chart entry, see the previously signed ED Provider Note written by Dr. Yelena Solis (ERP).      12:00 PM  - I discussed the patient's case with Dr. Solis (ERP) who will transfer care of the patient to me at this time.        1:14 PM - Patient was evaluated by life skills. Patient states he has had a cough. I informed him I will order a chest x-ray to evaluate. He is cooperative and understands the plan. His COVID test was negative and his chest x-ray was clear. The patient has done well during my period of observation. They continue to await transfer to an inpatient psychiatric facility.     The patient is an ED observation status patient had an appropriate note today.  This is merely an updated addendum.      FINAL IMPRESSION   1. Unable to care for self    2. Marijuana use    3. History of schizophrenia        I, Poornima Lama (Scribe), am scribing for, and in the presence of, Joe Horan M.D..    Electronically signed by: Poornima Lama (Scribe), 11/13/2021    Joe BERNABE M.D. personally performed the services described in this documentation, as scribed by Poornima Lama in my presence, and it is both accurate and complete.    The note accurately reflects work and decisions made by me.  Joe Horan M.D.  11/13/2021  3:45 PM

## 2021-11-13 NOTE — DISCHARGE PLANNING
Referral: Legal Hold    Intervention: Legal Hold Paperwork given to SW by [facility] RN Bill    Legal Hold Initiated: Date: 11/13/21 Time: 0500    Patient’s Insurance Listed on Face Sheet: [place] Medicaid    Referrals sent to: [facility] and [facility]    This referral contains the following information:  1) Face sheet __x__  2) Page 1 and Page 2 of Legal Hold _x___  3) Alert Team Assessment/Psych Assessment _x___  4) Head to toe physical exam __x__  5) Urine Drug Screen __x__  6) Blood Alcohol _x___  7) Vital signs __x__  8) Pregnancy test when applicable _n/a__  9) Medications list _x___    Plan: Patient will transfer to mental health facility once acceptance is obtained

## 2021-11-13 NOTE — ED NOTES
covid swab collected and sent. XR completed.     Provided lunch. States he doesn't want to eat at this time. Left food on bed next to pt.
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
## 2021-11-13 NOTE — ED NOTES
Ambulated pt around the department. Allowed to go into the ambulance bay and see the sun per his request. Pt then returned to room, continues to pace and wants to walk. Encouraged to stay in room for now.     Report given to ÁLVARO.

## 2021-11-13 NOTE — ED TRIAGE NOTES
Chief Complaint   Patient presents with   • Legal 2000     Inability to care for self     Pt BIB [facility]. [place] [employer] found pt wandering in the middle of the street. [employer] initiated a legal hold. Pt does not recall this event. Pt requesting to go to [facility].     Pt refusing vitals.

## 2021-11-13 NOTE — ED NOTES
[place]. No acute distress noted. Pt on legal 2000 hold, in full view of 1:1 sitter at all times.

## 2021-11-13 NOTE — ED NOTES
Pt resting in [facility]. Legal 2000 hold. In full view of 1:1 sitter at all times. No acute distress noted.
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
## 2021-11-14 NOTE — DISCHARGE PLANNING
Referral: Legal hold Transfer to Mental Health Facility    Intervention: SW received call from [facility] stating that they have accepted the patient for admission.     Pt's accepting physcian is Dr. Sj CHOWDHURY arranged for transportation to be set up through [facility].  TRENTON called [facility] and requested the trip with [place].    The pt will be picked up at 1700.     TRENTON notified the RN of the departure time as well as accepting facility.     TRENTON created transfer packet and placed on chart.     Plan: Pt will transfer to [facility] at 1700

## 2021-11-14 NOTE — ED NOTES
Pt attempted to walk outside without staff accompany, redirected into room. Provided additional lunch. Now resuming sleep.

## 2021-11-30 ENCOUNTER — HOSPITAL ENCOUNTER (EMERGENCY)
Facility: MEDICAL CENTER | Age: 26
End: 2021-12-01
Attending: EMERGENCY MEDICINE
Payer: MEDICAID

## 2021-11-30 DIAGNOSIS — F29 PSYCHOSIS, UNSPECIFIED PSYCHOSIS TYPE (HCC): ICD-10-CM

## 2021-11-30 LAB
AMPHET UR QL SCN: NEGATIVE
BARBITURATES UR QL SCN: NEGATIVE
BENZODIAZ UR QL SCN: NEGATIVE
BZE UR QL SCN: NEGATIVE
CANNABINOIDS UR QL SCN: POSITIVE
METHADONE UR QL SCN: NEGATIVE
OPIATES UR QL SCN: NEGATIVE
OXYCODONE UR QL SCN: NEGATIVE
PCP UR QL SCN: NEGATIVE
POC BREATHALIZER: 0 PERCENT (ref 0–0.01)
PROPOXYPH UR QL SCN: NEGATIVE

## 2021-11-30 PROCEDURE — A9270 NON-COVERED ITEM OR SERVICE: HCPCS | Performed by: EMERGENCY MEDICINE

## 2021-11-30 PROCEDURE — 99285 EMERGENCY DEPT VISIT HI MDM: CPT

## 2021-11-30 PROCEDURE — 700102 HCHG RX REV CODE 250 W/ 637 OVERRIDE(OP): Performed by: EMERGENCY MEDICINE

## 2021-11-30 PROCEDURE — 80307 DRUG TEST PRSMV CHEM ANLYZR: CPT

## 2021-11-30 PROCEDURE — 302970 POC BREATHALIZER: Performed by: EMERGENCY MEDICINE

## 2021-11-30 RX ORDER — DIVALPROEX SODIUM 500 MG/1
500 TABLET, DELAYED RELEASE ORAL ONCE
Status: COMPLETED | OUTPATIENT
Start: 2021-11-30 | End: 2021-11-30

## 2021-11-30 RX ORDER — ARIPIPRAZOLE 10 MG/1
15 TABLET ORAL ONCE
Status: COMPLETED | OUTPATIENT
Start: 2021-11-30 | End: 2021-11-30

## 2021-11-30 RX ORDER — OLANZAPINE 5 MG/1
10 TABLET, ORALLY DISINTEGRATING ORAL ONCE
Status: DISCONTINUED | OUTPATIENT
Start: 2021-11-30 | End: 2021-11-30

## 2021-11-30 RX ADMIN — ARIPIPRAZOLE 15 MG: 10 TABLET ORAL at 23:03

## 2021-11-30 RX ADMIN — DIVALPROEX SODIUM 500 MG: 500 TABLET, DELAYED RELEASE ORAL at 23:15

## 2021-12-01 VITALS
HEIGHT: 76 IN | OXYGEN SATURATION: 98 % | HEART RATE: 94 BPM | DIASTOLIC BLOOD PRESSURE: 81 MMHG | BODY MASS INDEX: 21.92 KG/M2 | WEIGHT: 180 LBS | SYSTOLIC BLOOD PRESSURE: 129 MMHG | RESPIRATION RATE: 18 BRPM | TEMPERATURE: 99.1 F

## 2021-12-01 NOTE — ED NOTES
Olga with [facility] called to say Dr. Gustafson will accept patient. Ready any time. Setup transport with Yelena from [facility], Auth #[id number]. [facility] ETA [id number]

## 2021-12-01 NOTE — ED NOTES
Pt stated that he would like to call his father to make sure he's ok - unsure as to why, waiting for ERP to see Pt;

## 2021-12-01 NOTE — ED NOTES
Update given to HERMILA Milian at [facility], accepting MD Garza, waiting for transport;
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
## 2021-12-01 NOTE — ED PROVIDER NOTES
"ED Provider Note    Scribed for Carley Chow M.D. by Mackenzie Reynolds. 11/30/2021, 8:20 PM.    Primary care provider: None.  Means of arrival: EMS  History obtained from: patient   History limited by: psychosis    CHIEF COMPLAINT  Chief Complaint   Patient presents with   • Schizophrenia     called 911 with garbled complaints of \"black magic\" and paranoid delusions.   • Off Psych Meds     Denies taking any medications currently. EMS reports patient is normally prescribed geodon and invega.       REBECCA Masters is a 26 y.o. male who presents to the Emergency Department for paranoia. Patient has a past medical history of schizophrenia. He states he was last at [facility] 2 weeks ago and EMS reports prescription for Geodon and Invega. Patient has associated nausea, but denies any other medical complaints. He states he \"lost his memory forever\" and per EMS he was complaining of \"black magic\" with delusions and paranoia.    Further history of present illness cannot be obtained due to the patient's psychosis     REVIEW OF SYSTEMS  Pertinent positives include nausea.     Further ROS cannot be obtained due to the patient's psychosis    PAST MEDICAL HISTORY   has a past medical history of Psychiatric disorder and TBI (traumatic brain injury) ([facility]).    SURGICAL HISTORY  patient denies any surgical history    SOCIAL HISTORY  Social History     Tobacco Use   • Smoking status: Current Every Day Smoker   • Smokeless tobacco: Never Used   Vaping Use   • Vaping Use: Unknown   Substance Use Topics   • Alcohol use: Yes   • Drug use: Yes     Comment: THC      Social History     Substance and Sexual Activity   Drug Use Yes    Comment: THC       FAMILY HISTORY  History reviewed. No pertinent family history.    CURRENT MEDICATIONS  Home Medications    **Home medications have not yet been reviewed for this encounter**         ALLERGIES  Allergies   Allergen Reactions   • Beef-Derived Products    • Doxycycline    • Pork Derived Products    • " "Penicillins        PHYSICAL EXAM  VITAL SIGNS: /84   Pulse (!) 103   Temp 37.6 °C (99.6 °F) (Temporal)   Resp 18   Ht 1.93 m (6' 4\")   Wt 81.6 kg (180 lb)   SpO2 99%   BMI 21.91 kg/m²   Constitutional: Alert, anxious, unkempt.   HENT: Normocephalic, Atraumatic, Bilateral external ears normal. Nose normal.   Eyes:  Conjunctiva normal, non-icteric.   Lungs: Non-labored respirations  Skin: Warm, Dry, No erythema, No rash.   Neurologic: Alert, Grossly non-focal.   Psychiatric: Anxious, paranoid    LABS  Labs Reviewed   URINE DRUG SCREEN - Abnormal; Notable for the following components:       Result Value    Cannabinoid Metab Positive (*)     All other components within normal limits   POC BREATHALIZER - Normal     All labs reviewed by me.      COURSE & MEDICAL DECISION MAKING  Pertinent Labs & Imaging studies reviewed. (See chart for details)    Prior records are reviewed patient has been seen multiple times both here at the [facility] and at [place] he has a history of polysubstance abuse with methamphetamines and marijuana.  He is known to be noncompliant with his medications and decompensates easily.    8:20 PM - Patient seen and examined at bedside. Patient will be evaluated by the behavioral health team to determine a further plan of care. Ordered POC breathalyzer and urine drug screen to evaluate his symptoms.     8:53 PM Patient admitted to ED Observation at this time on 11/30/21 for pending psychiatric evaluation and possible placement. He will be treated with Zyprexa TBDP 10 mg.    9:32 PM During admission to ED Observation patient is resting comfortable and has no complaints. Repeat exam was unchanged.     10:34 PM  Patient was seen by life skills. Patient is well known by Katherin.  He was discharged from [facility] 1 week ago. He is known to be non compliant with medications and has a high likelihood of decompensation.  He will require hospitalization.  He will be transferred to a psychiatric " facility when a bed is available.  Legal hold has been filled out.    At [facility] he was apparently on Abilify 15 mg nightly and Depakote 500 mg 3 times a day.        FINAL IMPRESSION  1. Psychosis, unspecified psychosis type (HCC)           This dictation has been created using voice recognition software and/or scribes. The accuracy of the dictation is limited by the abilities of the software and the expertise of the scribes. I expect there may be some errors of grammar and possibly content. I made every attempt to manually correct the errors within my dictation. However, errors related to voice recognition software and/or scribes may still exist and should be interpreted within the appropriate context.     FLORECITA, Mackenzie Reynolds (Scribe), am scribing for, and in the presence of, Carley Chow M.D..    Electronically signed by: Mackenzie Reynolds (Scribe), 11/30/2021    ICarley M.D. personally performed the services described in this documentation, as scribed by Mackenzie Reynolds in my presence, and it is both accurate and complete.    The note accurately reflects work and decisions made by me.  Carley Chow M.D.  11/30/2021  10:42 PM

## 2021-12-01 NOTE — ED TRIAGE NOTES
"Chief Complaint   Patient presents with   • Schizophrenia     called 911 with garbled complaints of \"black magic\" and paranoid delusions.   • Off Psych Meds     Denies taking any medications currently. EMS reports patient is normally prescribed geodon and invega.     ED Triage Vitals   Enc Vitals Group      Blood Pressure 11/30/21 1957 132/84      Pulse 11/30/21 1957 (!) 103      Respiration 11/30/21 1957 18      Temperature 11/30/21 1957 37.6 °C (99.6 °F)      Temp src 11/30/21 1957 Temporal      Pulse Oximetry 11/30/21 1957 99 %      Weight 11/30/21 2007 81.6 kg (180 lb)      Height 11/30/21 2007 1.93 m (6' 4\")     Denies SI/HI. Requesting klonopin. Difficult to assess due to tangential conversation.  "

## 2021-12-01 NOTE — ED NOTES
Sent referral packet to [facility], [place], [facility], [facility] and [facility]

## 2022-03-01 NOTE — ED NOTES
Improvement in anxiety after PRN medication. Patient resting in stretcher. No other needs. Sitter remains outside door.      23

## 2022-03-31 NOTE — NUR
Patient resting in gurney. Respirations even and unlabored. Room secured, 
belongings locked in cabinet. Sitter outside. Breakfast tray ordered. Please triage for NSAID allergy for knee surgery with Dr Miller on 4-26-22.

## 2022-04-16 ENCOUNTER — HOSPITAL ENCOUNTER (EMERGENCY)
Facility: MEDICAL CENTER | Age: 27
End: 2022-04-22
Attending: EMERGENCY MEDICINE
Payer: MEDICAID

## 2022-04-16 DIAGNOSIS — Z91.199 NONCOMPLIANCE: ICD-10-CM

## 2022-04-16 DIAGNOSIS — F20.1 DISORGANIZED SCHIZOPHRENIA (HCC): ICD-10-CM

## 2022-04-16 DIAGNOSIS — F23 ACUTE PSYCHOSIS (HCC): ICD-10-CM

## 2022-04-16 LAB
ALBUMIN SERPL BCP-MCNC: 4 G/DL (ref 3.2–4.9)
ALBUMIN/GLOB SERPL: 1.6 G/DL
ALP SERPL-CCNC: 75 U/L (ref 30–99)
ALT SERPL-CCNC: 18 U/L (ref 2–50)
ANION GAP SERPL CALC-SCNC: 11 MMOL/L (ref 7–16)
APAP SERPL-MCNC: <5 UG/ML (ref 10–30)
AST SERPL-CCNC: 26 U/L (ref 12–45)
BASOPHILS # BLD AUTO: 0.9 % (ref 0–1.8)
BASOPHILS # BLD: 0.08 K/UL (ref 0–0.12)
BILIRUB SERPL-MCNC: 0.2 MG/DL (ref 0.1–1.5)
BUN SERPL-MCNC: 13 MG/DL (ref 8–22)
CALCIUM SERPL-MCNC: 9 MG/DL (ref 8.5–10.5)
CHLORIDE SERPL-SCNC: 106 MMOL/L (ref 96–112)
CO2 SERPL-SCNC: 24 MMOL/L (ref 20–33)
CREAT SERPL-MCNC: 0.65 MG/DL (ref 0.5–1.4)
EOSINOPHIL # BLD AUTO: 0.51 K/UL (ref 0–0.51)
EOSINOPHIL NFR BLD: 6 % (ref 0–6.9)
ERYTHROCYTE [DISTWIDTH] IN BLOOD BY AUTOMATED COUNT: 50.5 FL (ref 35.9–50)
ETHANOL BLD-MCNC: <10.1 MG/DL (ref 0–10)
GFR SERPLBLD CREATININE-BSD FMLA CKD-EPI: 132 ML/MIN/1.73 M 2
GLOBULIN SER CALC-MCNC: 2.5 G/DL (ref 1.9–3.5)
GLUCOSE SERPL-MCNC: 106 MG/DL (ref 65–99)
HCT VFR BLD AUTO: 43.6 % (ref 42–52)
HGB BLD-MCNC: 14.2 G/DL (ref 14–18)
IMM GRANULOCYTES # BLD AUTO: 0.02 K/UL (ref 0–0.11)
IMM GRANULOCYTES NFR BLD AUTO: 0.2 % (ref 0–0.9)
LYMPHOCYTES # BLD AUTO: 2.61 K/UL (ref 1–4.8)
LYMPHOCYTES NFR BLD: 30.7 % (ref 22–41)
MCH RBC QN AUTO: 30.8 PG (ref 27–33)
MCHC RBC AUTO-ENTMCNC: 32.6 G/DL (ref 33.7–35.3)
MCV RBC AUTO: 94.6 FL (ref 81.4–97.8)
MONOCYTES # BLD AUTO: 0.57 K/UL (ref 0–0.85)
MONOCYTES NFR BLD AUTO: 6.7 % (ref 0–13.4)
NEUTROPHILS # BLD AUTO: 4.7 K/UL (ref 1.82–7.42)
NEUTROPHILS NFR BLD: 55.5 % (ref 44–72)
NRBC # BLD AUTO: 0 K/UL
NRBC BLD-RTO: 0 /100 WBC
PLATELET # BLD AUTO: 313 K/UL (ref 164–446)
PMV BLD AUTO: 10.2 FL (ref 9–12.9)
POC BREATHALIZER: 0 PERCENT (ref 0–0.01)
POTASSIUM SERPL-SCNC: 4 MMOL/L (ref 3.6–5.5)
PROT SERPL-MCNC: 6.5 G/DL (ref 6–8.2)
RBC # BLD AUTO: 4.61 M/UL (ref 4.7–6.1)
SALICYLATES SERPL-MCNC: <1 MG/DL (ref 15–25)
SODIUM SERPL-SCNC: 141 MMOL/L (ref 135–145)
WBC # BLD AUTO: 8.5 K/UL (ref 4.8–10.8)

## 2022-04-16 PROCEDURE — 700102 HCHG RX REV CODE 250 W/ 637 OVERRIDE(OP): Performed by: EMERGENCY MEDICINE

## 2022-04-16 PROCEDURE — 80053 COMPREHEN METABOLIC PANEL: CPT

## 2022-04-16 PROCEDURE — 80179 DRUG ASSAY SALICYLATE: CPT

## 2022-04-16 PROCEDURE — A9270 NON-COVERED ITEM OR SERVICE: HCPCS | Performed by: EMERGENCY MEDICINE

## 2022-04-16 PROCEDURE — 36415 COLL VENOUS BLD VENIPUNCTURE: CPT

## 2022-04-16 PROCEDURE — 302970 POC BREATHALIZER

## 2022-04-16 PROCEDURE — 99285 EMERGENCY DEPT VISIT HI MDM: CPT

## 2022-04-16 PROCEDURE — 85025 COMPLETE CBC W/AUTO DIFF WBC: CPT

## 2022-04-16 PROCEDURE — 82077 ASSAY SPEC XCP UR&BREATH IA: CPT

## 2022-04-16 PROCEDURE — 90791 PSYCH DIAGNOSTIC EVALUATION: CPT

## 2022-04-16 PROCEDURE — 302970 POC BREATHALIZER: Performed by: EMERGENCY MEDICINE

## 2022-04-16 PROCEDURE — 80143 DRUG ASSAY ACETAMINOPHEN: CPT

## 2022-04-16 RX ORDER — CLONAZEPAM 0.5 MG/1
1 TABLET ORAL ONCE
Status: COMPLETED | OUTPATIENT
Start: 2022-04-16 | End: 2022-04-16

## 2022-04-16 RX ORDER — ARIPIPRAZOLE 10 MG/1
15 TABLET ORAL DAILY
Status: DISCONTINUED | OUTPATIENT
Start: 2022-04-16 | End: 2022-04-22 | Stop reason: HOSPADM

## 2022-04-16 RX ORDER — LORAZEPAM 2 MG/1
2 TABLET ORAL ONCE
Status: COMPLETED | OUTPATIENT
Start: 2022-04-16 | End: 2022-04-16

## 2022-04-16 RX ADMIN — CLONAZEPAM 1 MG: 0.5 TABLET ORAL at 22:04

## 2022-04-16 RX ADMIN — LORAZEPAM 2 MG: 2 TABLET ORAL at 11:18

## 2022-04-16 NOTE — ED TRIAGE NOTES
"Donald Aaronessie  26 y.o. male  Chief Complaint   Patient presents with   • Medication Refill     Unable to obtain medications due to not having an ID   • Off Psych Meds     Per caregiver \"even if he gets his meds he will throw them away and go take meth and drink hand . He can't take care of himself. I have him ready to go to a 90 day program at [facility], he needs eval by ER psych to determine unable to care for himself.\" Patient is agreeable with this plan in triage.   • Psych Eval     \"I've been drinking hand  because it calms me down.\"   • Anxiety     Per caregiver \"patient tried to OD on pills on April 6th. He finished the bottle, there were only 7 pills left, patient was medically cleared. Any chance patient has to get hand  he drinks it.\"    Pt has flat affect in triage. Patient to green 30.     This RN masked and in appropriate PPE during encounter.     Vitals:    04/16/22 1000   BP: 121/74   Pulse: 98   Resp: 15   Temp: 36.7 °C (98.1 °F)   SpO2: 97%     "

## 2022-04-16 NOTE — ED NOTES
Pt. Family friend provided with chair; okay to stay per MD. Pt. Denies further needs and verbalized plan of care.
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
## 2022-04-16 NOTE — ED NOTES
Patient resting on gurney. No acute distress. Active chest rise noted. No agitation noted. No behavioral pain indicators. One to one sitter outside of room in direct view of patient.     Patient aware of need for urine sample. Urinal at bedside.

## 2022-04-16 NOTE — ED NOTES
Report received from HERMILA Alejandro. Assumed care of patient. Sitter in place. Caregiver at bedside.

## 2022-04-16 NOTE — ED NOTES
Med rec updated and complete. Allergies reviewed. Confirmed name and date of birth.    Pt is not currently taking any medications.        Home pharmacy [facility] [phone]

## 2022-04-17 PROCEDURE — A9270 NON-COVERED ITEM OR SERVICE: HCPCS | Performed by: EMERGENCY MEDICINE

## 2022-04-17 PROCEDURE — 700102 HCHG RX REV CODE 250 W/ 637 OVERRIDE(OP): Performed by: EMERGENCY MEDICINE

## 2022-04-17 RX ORDER — LORAZEPAM 2 MG/1
2 TABLET ORAL ONCE
Status: COMPLETED | OUTPATIENT
Start: 2022-04-17 | End: 2022-04-17

## 2022-04-17 RX ORDER — CLONAZEPAM 0.5 MG/1
1 TABLET ORAL ONCE
Status: COMPLETED | OUTPATIENT
Start: 2022-04-17 | End: 2022-04-17

## 2022-04-17 RX ORDER — NICOTINE 21 MG/24HR
1 PATCH, TRANSDERMAL 24 HOURS TRANSDERMAL EVERY 24 HOURS
Status: DISCONTINUED | OUTPATIENT
Start: 2022-04-17 | End: 2022-04-22 | Stop reason: HOSPADM

## 2022-04-17 RX ADMIN — CLONAZEPAM 1 MG: 0.5 TABLET ORAL at 21:28

## 2022-04-17 RX ADMIN — LORAZEPAM 2 MG: 2 TABLET ORAL at 10:51

## 2022-04-17 RX ADMIN — NICOTINE 14 MG: 14 PATCH TRANSDERMAL at 15:44

## 2022-04-17 NOTE — DISCHARGE PLANNING
[facility] ALERT TEAM DISCHARGE PLANNING NOTE    Date:  04/16/22  Patient Name:  Donald Masters - [age] y.o. - Discharge Planning  MRN:  [id number]   YOB: 1995  ADMISSION DATE:  4/16/2022      Writer forwarded transfer packet for inpatient psychiatric care to the following community providers:  [facility]IELL[facility]DE[facility]   Items  included in the transfer packet:   _x____Face Sheet   _x____Pages 1 and 2 of completed legal hold   _x____Alert Team/Psych Assessment   _____H&P   _____UDS   _x____Blood Alcohol   _x____Vital signs   _____Pregnancy Test (if applicable)   _x____Medications List   _____Covid Screen

## 2022-04-17 NOTE — ED NOTES
Sitter at bedside for 1:1 observation.    Rounded on Pt.    Pt requesting clonazepam. Informed MD. Orders received.     Updated Pt on plan of care. Pt verbalized understanding.  No acute distress at this time.  Will continue to monitor.

## 2022-04-17 NOTE — ED NOTES
Patient resting on gurney with eyes closed. Respirations even and unlabored. No s/s of distress noted. Sitter remains in place in direct sight of patient.

## 2022-04-17 NOTE — ED NOTES
Patient lying on bed giggling and talking to self. No s/s of distress noted. Sitter remains in place and in direct view of patient.

## 2022-04-17 NOTE — ED NOTES
Patient resting on gurney. Respirations even and unlabored. Sitter remains in direct view of patient. No needs noted.

## 2022-04-17 NOTE — DISCHARGE PLANNING
Alert Team/Behavioral Health Note:    - [facility]: talked to Odalis. No beds currently available. Patient is on waiting list.    - [facility]: talked to Gem. No beds currently available. Patient is on waiting list.

## 2022-04-17 NOTE — ED NOTES
In to take vitals on patient. Patient resting on gurney with eyes closed. Arouses to voice. Patient cooperative at this time. Denies any needs. Sitter remains in place in direct view of patient.

## 2022-04-17 NOTE — ED NOTES
Sitter at bedside for 1:1 observation.    Rounded on Pt. Pt resting comfortably with eyes closed. Respirations regular.  Will continue to monitor.

## 2022-04-17 NOTE — PROGRESS NOTES
Patient's home medications have been reviewed by the pharmacy team.     Past Medical History:   Diagnosis Date   • Psychiatric disorder     schizophrenia   • TBI (traumatic brain injury) ([facility])        Patient's Medications    No medications on file          A:  Medications do not appear to be contributing to current complaints as patient not on any home meds    P:    No recommendations at this time.     Julio Hicks, PharmD

## 2022-04-17 NOTE — ED NOTES
"Patient attempting to elope. Security in [place], able to redirect back to room. Patient stated, \"I have to talk to someone special. I need to leave.\" Patient back in room 30 at this time.   "

## 2022-04-17 NOTE — ED NOTES
"Patient keeps trying to elope. Patient states \"I keep having panic attacks, I need to leave.\" ERP notified and verbal orders obtained for ativan PO. Medication given without difficulty.   "

## 2022-04-17 NOTE — PROGRESS NOTES
"ED Provider Progress Note    ED Observation Progress Note    Date of Service: 04/17/22    Interval History  Patient has remained stable.  Escalating in the ER.  He was given 2 mg of Ativan orally.  His vital signs remained stable.  Reviewed all his data that was unremarkable.  We will continue with plan for psychiatric care.    Physical Exam  /68   Pulse 78   Temp 36.7 °C (98.1 °F) (Temporal)   Resp 16   Ht 1.93 m (6' 4\")   Wt 91.5 kg (201 lb 11.5 oz)   SpO2 95%   BMI 24.55 kg/m² .    Constitutional: Awake and alert. Nontoxic  HENT:  Grossly normal  Eyes: Grossly normal  Neck: Normal range of motion  Cardiovascular: Normal heart rate   Thorax & Lungs: No respiratory distress  Skin:  No pathologic rash.   Extremities: Well perfused  Psychiatric: Anxious    Labs  Results for orders placed or performed during the hospital encounter of 04/16/22   Blood Alcohol   Result Value Ref Range    Diagnostic Alcohol <10.1 0.0 - 10.0 mg/dL   CBC WITH DIFFERENTIAL   Result Value Ref Range    WBC 8.5 4.8 - 10.8 K/uL    RBC 4.61 (L) 4.70 - 6.10 M/uL    Hemoglobin 14.2 14.0 - 18.0 g/dL    Hematocrit 43.6 42.0 - 52.0 %    MCV 94.6 81.4 - 97.8 fL    MCH 30.8 27.0 - 33.0 pg    MCHC 32.6 (L) 33.7 - 35.3 g/dL    RDW 50.5 (H) 35.9 - 50.0 fL    Platelet Count 313 164 - 446 K/uL    MPV 10.2 9.0 - 12.9 fL    Neutrophils-Polys 55.50 44.00 - 72.00 %    Lymphocytes 30.70 22.00 - 41.00 %    Monocytes 6.70 0.00 - 13.40 %    Eosinophils 6.00 0.00 - 6.90 %    Basophils 0.90 0.00 - 1.80 %    Immature Granulocytes 0.20 0.00 - 0.90 %    Nucleated RBC 0.00 /100 WBC    Neutrophils (Absolute) 4.70 1.82 - 7.42 K/uL    Lymphs (Absolute) 2.61 1.00 - 4.80 K/uL    Monos (Absolute) 0.57 0.00 - 0.85 K/uL    Eos (Absolute) 0.51 0.00 - 0.51 K/uL    Baso (Absolute) 0.08 0.00 - 0.12 K/uL    Immature Granulocytes (abs) 0.02 0.00 - 0.11 K/uL    NRBC (Absolute) 0.00 K/uL   COMP METABOLIC PANEL   Result Value Ref Range    Sodium 141 135 - 145 mmol/L    " Potassium 4.0 3.6 - 5.5 mmol/L    Chloride 106 96 - 112 mmol/L    Co2 24 20 - 33 mmol/L    Anion Gap 11.0 7.0 - 16.0    Glucose 106 (H) 65 - 99 mg/dL    Bun 13 8 - 22 mg/dL    Creatinine 0.65 0.50 - 1.40 mg/dL    Calcium 9.0 8.5 - 10.5 mg/dL    AST(SGOT) 26 12 - 45 U/L    ALT(SGPT) 18 2 - 50 U/L    Alkaline Phosphatase 75 30 - 99 U/L    Total Bilirubin 0.2 0.1 - 1.5 mg/dL    Albumin 4.0 3.2 - 4.9 g/dL    Total Protein 6.5 6.0 - 8.2 g/dL    Globulin 2.5 1.9 - 3.5 g/dL    A-G Ratio 1.6 g/dL   Acetaminophen Level   Result Value Ref Range    Acetaminophen -Tylenol <5.0 (L) 10.0 - 30.0 ug/mL   SALICYLATE LEVEL   Result Value Ref Range    Salicylates, Quant. <1.0 (L) 15.0 - 25.0 mg/dL   ESTIMATED GFR   Result Value Ref Range    GFR (CKD-EPI) 132 >60 mL/min/1.73 m 2   POC BREATHALIZER   Result Value Ref Range    POC Breathalizer 0.000 0.00 - 0.01 Percent       Problem List  1.  Psychosis-continue with plan for psychiatric care      Electronically signed by: Donnie Fitzgerald M.D., 4/17/2022 10:48 AM

## 2022-04-17 NOTE — DISCHARGE PLANNING
"DATA: I received a telephone call from pt's sister, Holly Masters, who recounted that last time pt had been on a hold and had gone to court he had been retained on a legal hold. She expressed concern that this be the plan again.      She gave me her and her mother's numbers, mentioning that her mother is making efforts to become pt's guardian.   Sister, Holly Masters: [phone].   Mother, Italia Lira: [phone].   Both names are listed elsewhere in pt's chart as emergency contacts.    I explained that absent an authorization to release information I could not provide any update about pt. I gave Holly generic information about how psychiatric patients are typically seen by the court examiners on Wednesday, seen by the  in Court on Thursday.     I called Holly back to ask for mother's last name after I failed to find an authorization to release information. The above last name was drawn from the chart. I have yet to hear back from DAYTONjose.    I prepared authorizations for pt to sign, for both his mother and sister. When I approached pt, he dismissed my request for his signature with a polite, \"No thank you.\" A chart note mentions that pt has harbored paranoid thoughts toward his parents.     I have spoken with pt now several minutes in the context of bringing him food and juice. He has repeatedly requested to be released from the hold, has requested a cigarette, has asked to access fresh air, has requested his clothing on grounds of being cold (and was supplied blankets), has said he needs to go to [place] for an emergency, has said he needs to go to the [employer] (in truth, he is Moslem, and we are now in the middle of Ramadan, and pt has been observed at prayer, including just now before he ate), and has said he needs to dig up a million dollars.     Pt reported that he has been off his meds for he does not know how long.    Some signs of psychosis, of varying strength, are present. Pt has sat with his " "blanket over his head. He has a hx of schizophrenia and is off his meds. He is homeless, and could not recall where he had slept the night before his admission; before that: \"On the floor.\" He interjected into our conversation: \"want to become a rapper.\" His speech contains slips into incoherence. He sometimes displays laughter incongruent with the topic at hand.     Some quotes: \"I got to go home.\" \"I have an emergency.\" \"I'm a million dollars. I have to dig it up.\" \"I'm going to get unhealthy if I stay here.\" \"Need some klonopin to function [in] business.\" \"I need medicines badly.\"     What meds does he take? \"Clonazepam... uh, uh. That's it.\"     I returned from getting him more food and asked him where we were. He replied: \"So can I go home? I have to  a million-dollar check.... I'm dying. I have to go.... Please let me off the hold.\"     I have reviewed some of pt's chart, noting a hx of substance use, prior inpt treatment, and legal holds. I first became indirectly familiar with pt during his inpt stay at [facility] in 2019.    ASSESSMENT: Psychotic, incapable of providing adequate care for self in terms of housing and medication. Rule out: substance use disorder, severe.     PLAN: Per plan for inpt care via legal hold.   Ask pt again for authorization to release information.    "

## 2022-04-17 NOTE — ED NOTES
"Patient keeps coming to the door requesting to leave. Patient states, \"Can I get off the hold, I need to leave.\" Explained to patient the process. Patient back to bed. Denies any further needs. Security in unit for possible elopement risk. Sitter remains in direct sight of patient.   "

## 2022-04-17 NOTE — ED NOTES
Patient provided freezer meal and nicotine patch applied to right shoulder blade. Patient denies any further needs.

## 2022-04-17 NOTE — ED NOTES
Patient resting on gurney with eyes closed. Respirations even and unlabored. No s/s of distress noted.

## 2022-04-17 NOTE — ED NOTES
Patient ambulated to restroom. Did not urinate, just walked to bathroom. Walked back to room. Gait steady and even.

## 2022-04-17 NOTE — ED NOTES
Received report; assumed care of Pt.    Sitter at bedside for 1:1 observation.    Rounded on Pt. Pt resting comfortably with eyes closed. Respirations regular.  Will continue to monitor.

## 2022-04-17 NOTE — ED NOTES
Breakfast tray brought to patient. Patient states that he wants to be taken off the hold early. States that he is Spiritism and is practicing ramadan and needs to be taken off the hold early. Explained that the hold would remain in place until the ERP decided it wasn't needed. Redirected patient back to room. Patient provided more warm blankets. Patient easily redirectable at this time.

## 2022-04-17 NOTE — ED NOTES
Patient provided crackers. Patient now sitting back on bed. Respirations even and unlabored. No s/s of distress noted.

## 2022-04-17 NOTE — ED NOTES
Pt refused to take abilify told RN that he is allergic to it and gave him bad thoughts and made him dizzy, Dr. Agee notified.   aspirin 81 mg oral tablet, chewable: 1 tab(s) orally once a day  atorvastatin 80 mg oral tablet: 1 tab(s) orally once a day (at bedtime) MDD:1 tab  clopidogrel 75 mg oral tablet: 1 tab(s) orally once a day MDD:1 tab  metoprolol succinate 50 mg oral tablet, extended release: 1 tab(s) orally once a day  Norvasc 10 mg oral tablet: 1 tab(s) orally once a day  Protonix 40 mg oral delayed release tablet: 1 tab(s) orally once a day   Senna 8.6 mg oral tablet: 2 tab(s) orally once a day (at bedtime) aspirin 81 mg oral tablet, chewable: 1 tab(s) orally once a day  atorvastatin 80 mg oral tablet: 1 tab(s) orally once a day (at bedtime) MDD:1 tab  clopidogrel 75 mg oral tablet: 1 tab(s) orally once a day MDD:1 tab  lisinopril 40 mg oral tablet: 1 tab(s) orally once a day  meclizine 25 mg oral tablet: 1 tab(s) orally every 6 hours, As needed, Dizziness  metoprolol succinate 50 mg oral tablet, extended release: 1 tab(s) orally once a day  Protonix 40 mg oral delayed release tablet: 1 tab(s) orally once a day   Senna 8.6 mg oral tablet: 2 tab(s) orally once a day (at bedtime) aspirin 81 mg oral tablet, chewable: 1 tab(s) orally once a day  atorvastatin 80 mg oral tablet: 1 tab(s) orally once a day (at bedtime) MDD:1 tab  clopidogrel 75 mg oral tablet: 1 tab(s) orally once a day MDD:1 tab  lisinopril 40 mg oral tablet: 1 tab(s) orally once a day  meclizine 25 mg oral tablet: 1 tab(s) orally every 6 hours, As needed, Dizziness  metoprolol succinate 50 mg oral tablet, extended release: 1 tab(s) orally once a day  Protonix 40 mg oral delayed release tablet: 1 tab(s) orally once a day   Senna 8.6 mg oral tablet: 2 tab(s) orally once a day (at bedtime)  Vestibular rehabilitation: ICD 10 (R42- Dizziness)

## 2022-04-18 PROCEDURE — A9270 NON-COVERED ITEM OR SERVICE: HCPCS | Performed by: EMERGENCY MEDICINE

## 2022-04-18 PROCEDURE — 700102 HCHG RX REV CODE 250 W/ 637 OVERRIDE(OP): Performed by: EMERGENCY MEDICINE

## 2022-04-18 RX ORDER — CLONAZEPAM 0.5 MG/1
1 TABLET ORAL ONCE
Status: DISCONTINUED | OUTPATIENT
Start: 2022-04-18 | End: 2022-04-18

## 2022-04-18 RX ADMIN — NICOTINE 14 MG: 14 PATCH TRANSDERMAL at 09:10

## 2022-04-18 NOTE — ED NOTES
Explained to pt his medications. Stated to pt that he is being placed on Abilify. Pt refusing medication, stating that he was prescribed clonazepam and he does not want to take anything else.

## 2022-04-18 NOTE — PROGRESS NOTES
"ED Provider Progress Note    ED Observation Progress Note    Date of Service: 04/18/22    Interval History  Patient here awaiting placement for ongoing psychiatric issues.  Patient has required some doses of Ativan for some mild agitation throughout their stay here, last night no events.  Medications per psychiatric team recommendations have been ordered    Physical Exam  /66   Pulse 79   Temp 36.7 °C (98.1 °F) (Temporal)   Resp 14   Ht 1.93 m (6' 4\")   Wt 91.5 kg (201 lb 11.5 oz)   SpO2 96%   BMI 24.55 kg/m² .    Constitutional: Awake and alert. Nontoxic  HENT:  Grossly normal  Eyes: Grossly normal  Neck: Normal range of motion  Cardiovascular: Normal heart rate   Thorax & Lungs: No respiratory distress  Abdomen: Nontender  Skin:  No pathologic rash.   Extremities: Well perfused  Psychiatric: Affect normal    Labs  Results for orders placed or performed during the hospital encounter of 04/16/22   Blood Alcohol   Result Value Ref Range    Diagnostic Alcohol <10.1 0.0 - 10.0 mg/dL   CBC WITH DIFFERENTIAL   Result Value Ref Range    WBC 8.5 4.8 - 10.8 K/uL    RBC 4.61 (L) 4.70 - 6.10 M/uL    Hemoglobin 14.2 14.0 - 18.0 g/dL    Hematocrit 43.6 42.0 - 52.0 %    MCV 94.6 81.4 - 97.8 fL    MCH 30.8 27.0 - 33.0 pg    MCHC 32.6 (L) 33.7 - 35.3 g/dL    RDW 50.5 (H) 35.9 - 50.0 fL    Platelet Count 313 164 - 446 K/uL    MPV 10.2 9.0 - 12.9 fL    Neutrophils-Polys 55.50 44.00 - 72.00 %    Lymphocytes 30.70 22.00 - 41.00 %    Monocytes 6.70 0.00 - 13.40 %    Eosinophils 6.00 0.00 - 6.90 %    Basophils 0.90 0.00 - 1.80 %    Immature Granulocytes 0.20 0.00 - 0.90 %    Nucleated RBC 0.00 /100 WBC    Neutrophils (Absolute) 4.70 1.82 - 7.42 K/uL    Lymphs (Absolute) 2.61 1.00 - 4.80 K/uL    Monos (Absolute) 0.57 0.00 - 0.85 K/uL    Eos (Absolute) 0.51 0.00 - 0.51 K/uL    Baso (Absolute) 0.08 0.00 - 0.12 K/uL    Immature Granulocytes (abs) 0.02 0.00 - 0.11 K/uL    NRBC (Absolute) 0.00 K/uL   COMP METABOLIC PANEL   Result " Value Ref Range    Sodium 141 135 - 145 mmol/L    Potassium 4.0 3.6 - 5.5 mmol/L    Chloride 106 96 - 112 mmol/L    Co2 24 20 - 33 mmol/L    Anion Gap 11.0 7.0 - 16.0    Glucose 106 (H) 65 - 99 mg/dL    Bun 13 8 - 22 mg/dL    Creatinine 0.65 0.50 - 1.40 mg/dL    Calcium 9.0 8.5 - 10.5 mg/dL    AST(SGOT) 26 12 - 45 U/L    ALT(SGPT) 18 2 - 50 U/L    Alkaline Phosphatase 75 30 - 99 U/L    Total Bilirubin 0.2 0.1 - 1.5 mg/dL    Albumin 4.0 3.2 - 4.9 g/dL    Total Protein 6.5 6.0 - 8.2 g/dL    Globulin 2.5 1.9 - 3.5 g/dL    A-G Ratio 1.6 g/dL   Acetaminophen Level   Result Value Ref Range    Acetaminophen -Tylenol <5.0 (L) 10.0 - 30.0 ug/mL   SALICYLATE LEVEL   Result Value Ref Range    Salicylates, Quant. <1.0 (L) 15.0 - 25.0 mg/dL   ESTIMATED GFR   Result Value Ref Range    GFR (CKD-EPI) 132 >60 mL/min/1.73 m 2   POC BREATHALIZER   Result Value Ref Range    POC Breathalizer 0.000 0.00 - 0.01 Percent       Radiology  No orders to display       Problem List     1. Acute psychosis (HCC)    2. Disorganized schizophrenia (HCC)    3. Noncompliance            Electronically signed by: Lorenzo Chung M.D., 4/18/2022 7:11 AM

## 2022-04-18 NOTE — CONSULTS
"[facility] PSYCHIATRIC CONSULT:Intake  Reason for admission:    Per caregiver \"even if he gets his meds he will throw them away and go take meth and drink hand . He can't take care of himself. I have him ready to go to a 90 day program at [facility], he needs eval by ER psych to determine unable to care for himself.\" Patient is agreeable with this plan in triage         Consulting Physician/APN/PA: Aron Rodriguez M.D.  Reason for Consult: psychotic  Consultant: Antonia Beatty MD    Legal Status on hold     CC: Im fine as hand  leaks from the corner of his mouth  HPI: 27 yo male with hx of schizophrenia and non compliance, hx of neurocognitive disorder and Methamphetamine use all of which he denies, including feeling depressed, anxious or SI/HI. However he does want to go find buried millions. Denies substance abuse. Is a poor hx.     Chart(s) Review:  Available for review    Medical ROS:  As per tx tm    Psychiatric Exam (MSE):  Vitals:Blood pressure 121/65, pulse 68, temperature 36.7 °C (98 °F), temperature source Temporal, resp. rate 14, height 1.93 m (6' 4\"), weight 91.5 kg (201 lb 11.5 oz), SpO2 97 %.    General Appearance: tall, thin, trying to ingest hand , good eye contact, superficially cooperative which is the best he can be  Musculoskeletal: wnl  Alert/Orientation: grossly intact  Attn/Concentration:intact  Fund of Knowledge:not tested  Memory recent/remote: grossly intact  Speech:minimal  Language:  fluid  Thought Content: likely psychotic, denies this and SI/HI   Thought Process: linear   Insight/Judgement: impaired  Mood: good per pt  Affect: blunted    Past Medical Hx:     Past Medical History:   Diagnosis Date   • Psychiatric disorder     schizophrenia   • TBI (traumatic brain injury) (HCC)        Past Psychiatric Hx: reviewed    Family Psych Hx:  No family history on file.    Social Hx:  Drugs/Alcohol: Meth    Labs:  Lab Results   Component Value Date/Time    " AMPHUR Negative 11/30/2021 2041    BARBSURINE Negative 11/30/2021 2041    BENZODIAZU Negative 11/30/2021 2041    COCAINEMET Negative 11/30/2021 2041    METHADONE Negative 11/30/2021 2041    OPIATES Negative 11/30/2021 2041    OXYCODN Negative 11/30/2021 2041    PCPURINE Negative 11/30/2021 2041    PROPOXY Negative 11/30/2021 2041    CANNABINOID Positive (A) 11/30/2021 2041     Recent Labs     04/16/22  1112   WBC 8.5   RBC 4.61*   HEMOGLOBIN 14.2   HEMATOCRIT 43.6   MCV 94.6   MCH 30.8   RDW 50.5*   PLATELETCT 313   MPV 10.2   NEUTSPOLYS 55.50   LYMPHOCYTES 30.70   MONOCYTES 6.70   EOSINOPHILS 6.00   BASOPHILS 0.90     Recent Labs     04/16/22  1112   SODIUM 141   POTASSIUM 4.0   CHLORIDE 106   CO2 24   GLUCOSE 106*   BUN 13       Cranial Imaging: none    EKG: QTc:  none      Meds Current:  Scheduled Medications   Medication Dose Frequency   • nicotine  1 Patch Q24HR   • ARIPiprazole  15 mg DAILY     Allergies: Beef-derived products, Doxycycline, Pork derived products, and Penicillins      Assessement    1. Schizophrenia: active  2  Methamphetamine use disorder  3  Neurocognitive disorder from past TBI    Medical:    -none acutely      Recommendations:  Legal Status:  extended   Observation status:   -line of site with sitter in bathroom as well    Visitors: no  Personal belongings: no    Medication and Other Recommendations: final orders as per Tx Tm  1. Agree with treatment     Will continue to follow with you.    Thank you for the consult.    Discharge recommendations: inpt psych

## 2022-04-18 NOTE — ED NOTES
Patient provided with phone so he could call mother. Dialed phone number for patient. Patient spoke to mother. Patient gives this RN permission to speak to patient's mother to give information. Patient's mother would like patient sent to [facility]. She states that he had an incident happen at [facility] and she would not like him to be sent there. Information passed along to Alert Team.

## 2022-04-18 NOTE — ED NOTES
Pt in room doing yoga poses, requesting Klonopin. MD notified and pt medicated per MAR. No other complaints at this time. Sitter remains in direct 1:1 view

## 2022-04-18 NOTE — ED NOTES
Pt ambulated to restroom. Attempted to eat hand  on the way back to room. Sitter in direct view of pt.

## 2022-04-18 NOTE — ED NOTES
Pt attempted to drink hand  by leaving room and going to the dispenser outside [address]. Pt easily redirected back to .

## 2022-04-18 NOTE — ED NOTES
Pt up to use restroom. Sample cup provided to obtain urine sample but pt used cup to scoop urine out of toilet. Will re-attempt later

## 2022-04-18 NOTE — ED NOTES
Assumed care after receiving report from Violeta CLEANING. Pt resting in bed, appears comfortable, even rise and fall of chest, nad. Sitter at bedside for 1:1 direct observation

## 2022-04-19 LAB
AMPHET UR QL SCN: NEGATIVE
BARBITURATES UR QL SCN: NEGATIVE
BENZODIAZ UR QL SCN: NEGATIVE
BZE UR QL SCN: NEGATIVE
CANNABINOIDS UR QL SCN: POSITIVE
METHADONE UR QL SCN: NEGATIVE
OPIATES UR QL SCN: NEGATIVE
OXYCODONE UR QL SCN: NEGATIVE
PCP UR QL SCN: NEGATIVE
PROPOXYPH UR QL SCN: NEGATIVE

## 2022-04-19 PROCEDURE — 700102 HCHG RX REV CODE 250 W/ 637 OVERRIDE(OP): Performed by: EMERGENCY MEDICINE

## 2022-04-19 PROCEDURE — A9270 NON-COVERED ITEM OR SERVICE: HCPCS | Performed by: EMERGENCY MEDICINE

## 2022-04-19 PROCEDURE — 80307 DRUG TEST PRSMV CHEM ANLYZR: CPT

## 2022-04-19 RX ORDER — LORAZEPAM 1 MG/1
1 TABLET ORAL ONCE
Status: COMPLETED | OUTPATIENT
Start: 2022-04-19 | End: 2022-04-19

## 2022-04-19 RX ADMIN — LORAZEPAM 1 MG: 1 TABLET ORAL at 12:48

## 2022-04-19 RX ADMIN — ARIPIPRAZOLE 15 MG: 10 TABLET ORAL at 17:44

## 2022-04-19 RX ADMIN — LORAZEPAM 1 MG: 1 TABLET ORAL at 17:44

## 2022-04-19 NOTE — DISCHARGE PLANNING
Received Verified Involuntary Court Ordered Petition from the court. Scanned copy of Petition into pt's chart.

## 2022-04-19 NOTE — ED NOTES
Pt assisted to restroom. Pt attempting to drink hand  en route to restroom. Pt assisted to restroom with sitter and this RN.

## 2022-04-19 NOTE — DISCHARGE PLANNING
Alert team:    Patient resting throughout the night without incident with 1:1 sitter observing patient safety outside of room. Patient compliant with Abilify medication yesterday; no PRN medications requested or required during nightshift. Legal hold extended and continues to await acceptance at psychiatric facility. Alert Team will continue to monitor.

## 2022-04-19 NOTE — DISCHARGE PLANNING
[facility] ED Behavioral Health Fax Referral      [facility] ED Behavioral Health Alert Team:  [phone]    Referral: Legal Hold    Intervention: Patient referral to [facility] inpatient  facillity    Legal Hold Initiated: Date: 4/16/22 Time: 1728    Patient’s Insurance Listed on Face Sheet: FFS    Referrals sent to: [facility]    Referrals faxed by Sara Farmer    This referral contains the following information:  1) Face sheet _x_  2) Page 1 and Page 2 of Legal Hold _x___  3) Alert Team Assessment/Psych Assessment __x__  4) Head to toe physical exam __x__  5) Urine Drug Screen __x__  6) Blood Alcohol _x___  7) Vital signs __x__  8) Pregnancy test when applicable _n/a__  9) Medications list __x__  10) Covid screening ____    Plan: Patient will transfer to mental health facility once acceptance is obtained

## 2022-04-19 NOTE — ED NOTES
Problem: VTE, Risk for  Goal: # No s/s of VTE  Outcome: Outcome Met, Continue evaluating goal progress toward completion  Goal: # Verbalizes understanding of VTE risk factors and prevention  Description: Document education using the patient education activity.   Outcome: Outcome Met, Continue evaluating goal progress toward completion  Goal: Demonstrates ability to administer injectable anticoagulants if ordered for d/c  Description: Document education using the patient education activity.  Outcome: Outcome Met, Continue evaluating goal progress toward completion      "Blood sent to lab. Pt mumbling words but able to tell me his name \"Bess \".  Vitals taken,stable.   "

## 2022-04-19 NOTE — ED NOTES
Report to HERMILA Bonner.   Incidental Squamous Cell Carcinoma In Situ Histology Text: Full thickness keratinocyte epidermal atypia was noted microscopically.

## 2022-04-19 NOTE — ED NOTES
Ambulated to restroom, pt refused obtaining a UA, when asked to get vitals pt refused, ambulated back to [facility] to lay down

## 2022-04-19 NOTE — ED NOTES
Report received from prior RN. Resp normal/unlabored. Bed side rails up/in low position. Pt updated to POC and all questions answered.     Pt ignoring contact from RN, was informed from sitter that pt wanted PM meds, informed sitter to let RN know if pt asks for meds again    Continued legal hold, sitter at bedside observing pt, no known or current aspects of SI.

## 2022-04-19 NOTE — PROGRESS NOTES
"ED Provider Progress Note    ED Observation Progress Note    Date of Service: 04/19/22    Interval History  Patient is resting comfortably upon my evaluation.  Apparently he has been refusing interventions throughout the night.  Was seen by psychiatry who does think the patient is an active psychosis/schizophrenia.  Legal hold was extended.  We will continue to monitor at this time and continue to seek psychiatric placement.    Physical Exam  /65   Pulse 68   Temp 36.7 °C (98 °F) (Temporal)   Resp 14   Ht 1.93 m (6' 4\")   Wt 91.5 kg (201 lb 11.5 oz)   SpO2 97%   BMI 24.55 kg/m² .    Constitutional: Awake and alert. Nontoxic  HENT:  Grossly normal  Eyes: Grossly normal  Neck: Normal range of motion  Cardiovascular: Normal heart rate   Thorax & Lungs: No respiratory distress  Abdomen: Nontender  Skin:  No pathologic rash.   Extremities: Well perfused  Psychiatric: Flat affect    Labs  Results for orders placed or performed during the hospital encounter of 04/16/22   Blood Alcohol   Result Value Ref Range    Diagnostic Alcohol <10.1 0.0 - 10.0 mg/dL   CBC WITH DIFFERENTIAL   Result Value Ref Range    WBC 8.5 4.8 - 10.8 K/uL    RBC 4.61 (L) 4.70 - 6.10 M/uL    Hemoglobin 14.2 14.0 - 18.0 g/dL    Hematocrit 43.6 42.0 - 52.0 %    MCV 94.6 81.4 - 97.8 fL    MCH 30.8 27.0 - 33.0 pg    MCHC 32.6 (L) 33.7 - 35.3 g/dL    RDW 50.5 (H) 35.9 - 50.0 fL    Platelet Count 313 164 - 446 K/uL    MPV 10.2 9.0 - 12.9 fL    Neutrophils-Polys 55.50 44.00 - 72.00 %    Lymphocytes 30.70 22.00 - 41.00 %    Monocytes 6.70 0.00 - 13.40 %    Eosinophils 6.00 0.00 - 6.90 %    Basophils 0.90 0.00 - 1.80 %    Immature Granulocytes 0.20 0.00 - 0.90 %    Nucleated RBC 0.00 /100 WBC    Neutrophils (Absolute) 4.70 1.82 - 7.42 K/uL    Lymphs (Absolute) 2.61 1.00 - 4.80 K/uL    Monos (Absolute) 0.57 0.00 - 0.85 K/uL    Eos (Absolute) 0.51 0.00 - 0.51 K/uL    Baso (Absolute) 0.08 0.00 - 0.12 K/uL    Immature Granulocytes (abs) 0.02 0.00 - " 0.11 K/uL    NRBC (Absolute) 0.00 K/uL   COMP METABOLIC PANEL   Result Value Ref Range    Sodium 141 135 - 145 mmol/L    Potassium 4.0 3.6 - 5.5 mmol/L    Chloride 106 96 - 112 mmol/L    Co2 24 20 - 33 mmol/L    Anion Gap 11.0 7.0 - 16.0    Glucose 106 (H) 65 - 99 mg/dL    Bun 13 8 - 22 mg/dL    Creatinine 0.65 0.50 - 1.40 mg/dL    Calcium 9.0 8.5 - 10.5 mg/dL    AST(SGOT) 26 12 - 45 U/L    ALT(SGPT) 18 2 - 50 U/L    Alkaline Phosphatase 75 30 - 99 U/L    Total Bilirubin 0.2 0.1 - 1.5 mg/dL    Albumin 4.0 3.2 - 4.9 g/dL    Total Protein 6.5 6.0 - 8.2 g/dL    Globulin 2.5 1.9 - 3.5 g/dL    A-G Ratio 1.6 g/dL   Acetaminophen Level   Result Value Ref Range    Acetaminophen -Tylenol <5.0 (L) 10.0 - 30.0 ug/mL   SALICYLATE LEVEL   Result Value Ref Range    Salicylates, Quant. <1.0 (L) 15.0 - 25.0 mg/dL   ESTIMATED GFR   Result Value Ref Range    GFR (CKD-EPI) 132 >60 mL/min/1.73 m 2   POC BREATHALIZER   Result Value Ref Range    POC Breathalizer 0.000 0.00 - 0.01 Percent         Problem List  1.  Schizophrenia, active  2.  Methamphetamine abuse  3.  Neurocognitive disorder due to previous TBI      Electronically signed by: Aniket Saunders M.D., 4/19/2022 6:06 AM

## 2022-04-19 NOTE — DISCHARGE PLANNING
Alert Team Note:    Contacted by [facility], spoke to Rakel. Pt has been declined due to staffing being unable to accommodate an aggressive pt at this time. Per Rakel, pt packet can be re-sent tomorrow for reconsideration.   JOY

## 2022-04-19 NOTE — DISCHARGE PLANNING
Legal Hold    Referral: Legal Hold Court     Intervention: Pt presented for legal hold meeting with  via video conferencing.  advised pt will meet with court MD's via telemedicine monitor to contest the legal hold.      Plan: Pt will present to telemedicine mental health to meet with court physicians 4/20. Will call bedside RN once time has been determined.

## 2022-04-19 NOTE — ED NOTES
Report received from HERMILA Bonner.  Patient resting on gurney quietly. NAD.  Sitter in direct view of patient.

## 2022-04-20 PROCEDURE — 700102 HCHG RX REV CODE 250 W/ 637 OVERRIDE(OP): Performed by: EMERGENCY MEDICINE

## 2022-04-20 PROCEDURE — A9270 NON-COVERED ITEM OR SERVICE: HCPCS | Performed by: EMERGENCY MEDICINE

## 2022-04-20 RX ADMIN — ARIPIPRAZOLE 15 MG: 10 TABLET ORAL at 18:10

## 2022-04-20 RX ADMIN — NICOTINE 14 MG: 14 PATCH TRANSDERMAL at 16:51

## 2022-04-20 NOTE — ED NOTES
Spoke with pt's mother and updated on POC. Mother would like to speak to social work or alert team.

## 2022-04-20 NOTE — ED NOTES
Pt came out of room and ran to hand  and ate it. Instructed pt not to eat hand , no evidence of learning. Will continue to monitor.

## 2022-04-20 NOTE — DISCHARGE PLANNING
Spoke to  Gurdeep regarding patient's meeting with the court doctors today. Patient has been scheduled to meet with court doctors via video conferencing at 1000. Let HERMILA Diggs know of upcoming meeting this morning.

## 2022-04-20 NOTE — DISCHARGE PLANNING
Legal Hold    Referral: Legal Hold Court     Intervention: Pt met with court MD's via telemedicine monitor to contest the legal hold.     Court MD's did not release pt from legal hold. Pt will need to meet with District  4/21 via video conferencing in the [place] [id number] family room. Once time has been determined will notify bedside R.N.

## 2022-04-20 NOTE — ED PROVIDER NOTES
ED Provider Note    Subjective:    24-year-old patient presented here 3 days ago for acute agitation, disorganized schizophrenia in the setting of noncompliance.  Patient does not endorse any significant agitation currently    Objective:    Date and Time Temp Pulse Heart Rate (Monitored) Resp BP NIBP SpO2 Room air O2 Patient BP Position BP Location O2 (LPM) O2 Delivery Device 5 User   04/20/22 1245 36.7 °C (98.1 °F) 78 -- 18 101/62 -- 96 % -- Sitting Left;Upper Arm 0 None - Room Air -- MJR   04/20/22 0621 -- 91 -- 18 100/66 -- 96 % -- -- -- -- None - Room Air -- ANB   04/20/22 0000 -- 77 -- 18 95/61 (Abnormal)   -- 94 % -- -- -- -- None - Room Air -- AN     No acute distress resting comfortably      Assessment and plan::    Patient remains on a legal hold.  Does not peer to require parenteral antipsychotics.  Eating meals no acute distress.  We are still awaiting acceptance at an appropriate psychiatric facility

## 2022-04-20 NOTE — ED NOTES
Patient is resting comfortably, even and equal respirations noted from doorway. 1:1 sitter remains at bedside

## 2022-04-20 NOTE — ED PROVIDER NOTES
ED PROVIDER NOTE    Scribed for Silver Herrera M.D. by Ambreen Agee. 4/20/2022, 2:40 AM.    This is an addendum to the note on Donald Masters. For further details and full chart entry, see the previously signed ED Provider Note written by Dr. Hooks (ERP).      10:36 PM - I discussed the patient's case with Dr. Hooks (ERP) who will transfer care of the patient to me at this time.        2:41 AM - Recheck: Patient re-evaluated at bedside. Patient is resting comfortably with non-labored respirations.     Pt care s/o to oncoming ERP pending psych placement    FINAL IMPRESSION   1. Acute psychosis (HCC)    2. Disorganized schizophrenia (HCC)    3. Noncompliance       Ambreen BERNABE (Scribyakov), am scribing for, and in the presence of, Silver Herrera M.D..    Electronically signed by: Ambreen Agee (Scribe), 4/20/2022    ISilver M.D. personally performed the services described in this documentation, as scribed by Ambreen Agee in my presence, and it is both accurate and complete.    The note accurately reflects work and decisions made by me.  Silver Herrera M.D.  4/20/2022  5:55 AM

## 2022-04-20 NOTE — CONSULTS
"[facility] PSYCHIATRIC CONSULT:      DOS: 04/20/2022    Consulting Physician: Aron Rodriguez M.D.    Reason for Consult: psychotic    Legal Status on hold     CC: \"I don't remember why I am here, somebody brought me here\"    HPI: Per previous provider notes:  27 yo male with hx of schizophrenia and non compliance, hx of neurocognitive disorder and Methamphetamine use all of which he denies, he does want to go find buried millions. Per caregiver \"even if he gets his meds he will throw them away and go take meth and drink hand . He can't take care of himself. I have him ready to go to a 90 day program at [facility], he needs eval by ER psych to determine unable to care for himself.\" Patient is agreeable with this plan in triage. \"I've been drinking hand  because it calms me down. Per caregiver \"patient tried to OD on pills on April 6th. He finished the bottle, there were only 7 pills left, patient was medically cleared. Any chance patient has to get hand  he drinks it.\"    Patient was seen today as a follow up. He does not remember how or what brought him to the hospital, he just wants to get out of here. States \"I don't remember, somebody brought me here, they said I at a lot of bottles of medications but I didn't, I am homeless\"  Initially reports he slept \"alright\" last night, then towards the end of the assessment, when this writer said he looked sleepy, patient replied, \"It is because I am not sleeping well, I need to be discharged, I need to get off this hold thing\" Denies SI//HI/AVH. Reports he is depressed and anxious but does not know why, states \"It is just like magic\" Reports he only takes \"A few sips\" of alcohol every other day, and \"just a drop\" of hand  from the dispensary. He uses as much Marijuana as he can.    Psychiatric Exam (MSE):  Vitals:Blood pressure 100/66, pulse 91, temperature 37.2 °C (98.9 °F), temperature source Temporal, resp. rate 18, " "height 1.93 m (6' 4\"), weight 91.5 kg (201 lb 11.5 oz), SpO2 96 %.    General Appearance: tall, thin, good eye contact, guarded.  Musculoskeletal: wnl  Alert/Orientation: grossly intact  Attn/Concentration:intact  Fund of Knowledge:not tested  Memory recent/remote: grossly intact  Speech:Disorganized, loose association, word finding  Language:  fluid  Thought Content: likely psychotic, denies this and SI/HI   Thought Process: linear   Insight/Judgement: impaired  Mood: \"Depreseed\"  Affect: blunted    Past Medical Hx:     Past Medical History:   Diagnosis Date   • Psychiatric disorder     schizophrenia   • TBI (traumatic brain injury) (HCC)        Past Psychiatric Hx: reviewed    Family Psych Hx:  No family history on file.    Social Hx:  Drugs/Alcohol: Meth    Labs:  Lab Results   Component Value Date/Time    AMPHUR Negative 11/30/2021 2041    BARBSURINE Negative 11/30/2021 2041    BENZODIAZU Negative 11/30/2021 2041    COCAINEMET Negative 11/30/2021 2041    METHADONE Negative 11/30/2021 2041    OPIATES Negative 11/30/2021 2041    OXYCODN Negative 11/30/2021 2041    PCPURINE Negative 11/30/2021 2041    PROPOXY Negative 11/30/2021 2041    CANNABINOID Positive (A) 11/30/2021 2041       Cranial Imaging: none    EKG: QTc:  none      Meds Current:  Scheduled Medications   Medication Dose Frequency   • nicotine  1 Patch Q24HR   • ARIPiprazole  15 mg DAILY     Allergies: Beef-derived products, Doxycycline, Pork derived products, and Penicillins    ASSESSMENT AND PLAN:   1 Disorganized Schizophrenia    2  Methamphetamine use disorder  3  Neurocognitive disorder from past TBI    Legal Hold: Extended  -No medication changes needed at this time.  -Psych CL will continue following patient while at [facility].  -Medication reconciliation was completed.  -Reviewed safety plan - 911, ER, PCM, MHC, Suicide Crisis Line, Nursing staff while    inpatient.  -Visitors: Yes  -Personal Belongings: Yes  -Observation Level: line of site with " sitter in bathroom as well  -Instruction: Transfer to inpatient psych when medically cleared

## 2022-04-21 PROCEDURE — 700102 HCHG RX REV CODE 250 W/ 637 OVERRIDE(OP): Performed by: EMERGENCY MEDICINE

## 2022-04-21 PROCEDURE — A9270 NON-COVERED ITEM OR SERVICE: HCPCS | Performed by: EMERGENCY MEDICINE

## 2022-04-21 PROCEDURE — 700111 HCHG RX REV CODE 636 W/ 250 OVERRIDE (IP)

## 2022-04-21 PROCEDURE — 96372 THER/PROPH/DIAG INJ SC/IM: CPT

## 2022-04-21 RX ORDER — DIPHENHYDRAMINE HYDROCHLORIDE 50 MG/ML
50 INJECTION INTRAMUSCULAR; INTRAVENOUS ONCE
Status: DISPENSED | OUTPATIENT
Start: 2022-04-21 | End: 2022-04-22

## 2022-04-21 RX ORDER — HALOPERIDOL 5 MG/ML
INJECTION INTRAMUSCULAR
Status: COMPLETED
Start: 2022-04-21 | End: 2022-04-21

## 2022-04-21 RX ORDER — LORAZEPAM 2 MG/ML
2 INJECTION INTRAMUSCULAR ONCE
Status: COMPLETED | OUTPATIENT
Start: 2022-04-21 | End: 2022-04-21

## 2022-04-21 RX ORDER — HALOPERIDOL 5 MG/ML
5 INJECTION INTRAMUSCULAR ONCE
Status: COMPLETED | OUTPATIENT
Start: 2022-04-21 | End: 2022-04-21

## 2022-04-21 RX ORDER — CLONAZEPAM 0.5 MG/1
0.5 TABLET ORAL ONCE
Status: COMPLETED | OUTPATIENT
Start: 2022-04-21 | End: 2022-04-21

## 2022-04-21 RX ADMIN — NICOTINE 14 MG: 14 PATCH TRANSDERMAL at 12:24

## 2022-04-21 RX ADMIN — LORAZEPAM 2 MG: 2 INJECTION INTRAMUSCULAR; INTRAVENOUS at 14:27

## 2022-04-21 RX ADMIN — CLONAZEPAM 0.5 MG: 0.5 TABLET ORAL at 12:36

## 2022-04-21 RX ADMIN — HALOPERIDOL LACTATE 5 MG: 5 INJECTION, SOLUTION INTRAMUSCULAR at 14:27

## 2022-04-21 RX ADMIN — HALOPERIDOL 5 MG: 5 INJECTION INTRAMUSCULAR at 14:27

## 2022-04-21 NOTE — ED NOTES
Pt re-vitalized, VSS. Pt verbalizes desire to leave, RN re-educated pt about his legal hold and POC. Pt verbalized understanding. Pt now resting in bed, ROXANNE, 1:1 sitter in direct view of pt.

## 2022-04-21 NOTE — DISCHARGE PLANNING
Pt met with  Stacy for court hearing regarding legal hold being released or not. Legal hold was not released. Pt has been court committed to a mental health facility. Once court ordered commitment is received will scan into pt's chart.

## 2022-04-21 NOTE — ED NOTES
Pt sleeping in bed, no behavorial indicators of distress, equal chest and rise and fall noted, 1:1 sitter in direct view of pt.

## 2022-04-21 NOTE — ED NOTES
Pt sleeping in bed, respirations even and unlabored, no behavorial indicators of distress, pt in direct view of RN.

## 2022-04-21 NOTE — ED NOTES
Pt showered with ED tech at the door. Pt attempted to drink hand  between room and shower.     1:1 sitter at bedside.

## 2022-04-21 NOTE — ED NOTES
"Pt currently resting on gurney, awake, calm at this time.  Denies SI at this time but states \"I want to go\".  Pt updated that he remains on a legal hold and is awaiting acceptance at a psych facility. 1:1 observation remains in place.   "

## 2022-04-21 NOTE — ED NOTES
1:1 sitter in direct view of pt.   Pt sleeping in bed, equal chest rise an fall, no behavioral indicators of pain or distress.

## 2022-04-21 NOTE — ED NOTES
Pt ambulatory to restroom. More calm and cooperative at this time.     Pt given hot meal.    1:1 sitter at bedside.

## 2022-04-21 NOTE — DISCHARGE PLANNING
ALERT team  note:  Per [facility] , they decline pt for admission d/t pt's h/o of physical aggression

## 2022-04-21 NOTE — CONSULTS
"[facility] PSYCHIATRIC CONSULT:      DOS: 04/21/2022    Consulting Physician: Aron Rodriguez M.D.    Reason for Consult: psychotic    Legal Status on hold     CC: \"Can I be discharged today?\"    HPI: Per previous provider notes:  27 yo male with hx of schizophrenia and non compliance, hx of neurocognitive disorder and Methamphetamine use all of which he denies, he does want to go find buried millions. Per caregiver \"even if he gets his meds he will throw them away and go take meth and drink hand . He can't take care of himself. I have him ready to go to a 90 day program at [facility], he needs eval by ER psych to determine unable to care for himself.\" Patient is agreeable with this plan in triage. \"I've been drinking hand  because it calms me down. Per caregiver \"patient tried to OD on pills on April 6th. He finished the bottle, there were only 7 pills left, patient was medically cleared. Any chance patient has to get hand  he drinks it.\"    Patient was seen today as a follow up. Asking if he can be discharged today. He was reminded of his court meeting today, he is court committed, he replied \"O yeah\" seems distant, forgetful. Denies SI/HI/AVH. Reports he is \"a little depressed and anxious\"        Psychiatric Exam (MSE):  Vitals:Blood pressure 120/65, pulse 85, temperature 37.2 °C (98.9 °F), temperature source Temporal, resp. rate 16, height 1.93 m (6' 4\"), weight 91.5 kg (201 lb 11.5 oz), SpO2 99 %.    General Appearance: tall, thin, good eye contact, guarded.  Musculoskeletal: wnl  Alert/Orientation: grossly intact  Attn/Concentration:intact  Fund of Knowledge:not tested  Memory recent/remote: grossly intact  Speech:Disorganized, loose association, word finding  Language:  fluid  Thought Content: likely psychotic, denies this and SI/HI   Thought Process: linear   Insight/Judgement: impaired  Mood: \"Depreseed\"  Affect: blunted    Past Medical Hx:     Past Medical History: "   Diagnosis Date   • Psychiatric disorder     schizophrenia   • TBI (traumatic brain injury) (HCC)        Past Psychiatric Hx: reviewed    Family Psych Hx:  No family history on file.    Social Hx:  Drugs/Alcohol: Meth    Labs:  Lab Results   Component Value Date/Time    AMPHUR Negative 11/30/2021 2041    BARBSURINE Negative 11/30/2021 2041    BENZODIAZU Negative 11/30/2021 2041    COCAINEMET Negative 11/30/2021 2041    METHADONE Negative 11/30/2021 2041    OPIATES Negative 11/30/2021 2041    OXYCODN Negative 11/30/2021 2041    PCPURINE Negative 11/30/2021 2041    PROPOXY Negative 11/30/2021 2041    CANNABINOID Positive (A) 11/30/2021 2041       Cranial Imaging: none    EKG: QTc:  none      Meds Current:  Scheduled Medications   Medication Dose Frequency   • nicotine  1 Patch Q24HR   • ARIPiprazole  15 mg DAILY     Allergies: Beef-derived products, Doxycycline, Pork derived products, and Penicillins    ASSESSMENT AND PLAN:   1 Disorganized Schizophrenia    2  Methamphetamine use disorder  3  Neurocognitive disorder from past TBI    Legal Hold: Extended  -No medication changes needed at this time.  -Psych CL will continue following patient while at [facility].  -Medication reconciliation was completed.  -Reviewed safety plan - 911, ER, PCM, MHC, Suicide Crisis Line, Nursing staff while    inpatient.  -Visitors: Yes  -Personal Belongings: Yes  -Observation Level: line of site with sitter in bathroom as well  -Instruction: Transfer to inpatient psych when medically cleared

## 2022-04-21 NOTE — ED NOTES
Rounded on pt. Pt resting in bed, states all needs are met at this time, VSS, NAD at this time, 1:1 sitter in place.

## 2022-04-21 NOTE — ED NOTES
Pt requesting to be either transferred to a psych facility or to go home.     RN explained we are awaiting transfer and that he is currently on a legal hold and therefore cannot leave.     Pt cooperative.     1:1 sitter at bedside.

## 2022-04-21 NOTE — PROGRESS NOTES
"ED Provider Progress Note    ED Observation Progress Note    Date of Service: 04/21/22    Interval History  There have not been any events while the patient is under my care.  Vital signs are stable.  No medical complaints.  Seen again by psychiatry today.  We will proceed with current plan.      Physical Exam  /65   Pulse 85   Temp 37.2 °C (98.9 °F) (Temporal)   Resp 16   Ht 1.93 m (6' 4\")   Wt 91.5 kg (201 lb 11.5 oz)   SpO2 99%   BMI 24.55 kg/m² .    Constitutional: Awake and alert. Nontoxic  HENT:  Grossly normal  Eyes: Grossly normal  Extremities: Well perfused      Labs  Results for orders placed or performed during the hospital encounter of 04/16/22   Urine Drug Screen   Result Value Ref Range    Amphetamines Urine Negative Negative    Barbiturates Negative Negative    Benzodiazepines Negative Negative    Cocaine Metabolite Negative Negative    Methadone Negative Negative    Opiates Negative Negative    Oxycodone Negative Negative    Phencyclidine -Pcp Negative Negative    Propoxyphene Negative Negative    Cannabinoid Metab Positive (A) Negative   Blood Alcohol   Result Value Ref Range    Diagnostic Alcohol <10.1 0.0 - 10.0 mg/dL   CBC WITH DIFFERENTIAL   Result Value Ref Range    WBC 8.5 4.8 - 10.8 K/uL    RBC 4.61 (L) 4.70 - 6.10 M/uL    Hemoglobin 14.2 14.0 - 18.0 g/dL    Hematocrit 43.6 42.0 - 52.0 %    MCV 94.6 81.4 - 97.8 fL    MCH 30.8 27.0 - 33.0 pg    MCHC 32.6 (L) 33.7 - 35.3 g/dL    RDW 50.5 (H) 35.9 - 50.0 fL    Platelet Count 313 164 - 446 K/uL    MPV 10.2 9.0 - 12.9 fL    Neutrophils-Polys 55.50 44.00 - 72.00 %    Lymphocytes 30.70 22.00 - 41.00 %    Monocytes 6.70 0.00 - 13.40 %    Eosinophils 6.00 0.00 - 6.90 %    Basophils 0.90 0.00 - 1.80 %    Immature Granulocytes 0.20 0.00 - 0.90 %    Nucleated RBC 0.00 /100 WBC    Neutrophils (Absolute) 4.70 1.82 - 7.42 K/uL    Lymphs (Absolute) 2.61 1.00 - 4.80 K/uL    Monos (Absolute) 0.57 0.00 - 0.85 K/uL    Eos (Absolute) 0.51 0.00 - 0.51 " K/uL    Baso (Absolute) 0.08 0.00 - 0.12 K/uL    Immature Granulocytes (abs) 0.02 0.00 - 0.11 K/uL    NRBC (Absolute) 0.00 K/uL   COMP METABOLIC PANEL   Result Value Ref Range    Sodium 141 135 - 145 mmol/L    Potassium 4.0 3.6 - 5.5 mmol/L    Chloride 106 96 - 112 mmol/L    Co2 24 20 - 33 mmol/L    Anion Gap 11.0 7.0 - 16.0    Glucose 106 (H) 65 - 99 mg/dL    Bun 13 8 - 22 mg/dL    Creatinine 0.65 0.50 - 1.40 mg/dL    Calcium 9.0 8.5 - 10.5 mg/dL    AST(SGOT) 26 12 - 45 U/L    ALT(SGPT) 18 2 - 50 U/L    Alkaline Phosphatase 75 30 - 99 U/L    Total Bilirubin 0.2 0.1 - 1.5 mg/dL    Albumin 4.0 3.2 - 4.9 g/dL    Total Protein 6.5 6.0 - 8.2 g/dL    Globulin 2.5 1.9 - 3.5 g/dL    A-G Ratio 1.6 g/dL   Acetaminophen Level   Result Value Ref Range    Acetaminophen -Tylenol <5.0 (L) 10.0 - 30.0 ug/mL   SALICYLATE LEVEL   Result Value Ref Range    Salicylates, Quant. <1.0 (L) 15.0 - 25.0 mg/dL   ESTIMATED GFR   Result Value Ref Range    GFR (CKD-EPI) 132 >60 mL/min/1.73 m 2   POC BREATHALIZER   Result Value Ref Range    POC Breathalizer 0.000 0.00 - 0.01 Percent       Problem List  1.  Psychosis-continue with plan for psychiatric care      Electronically signed by: Donnie Fitzgerald M.D., 4/21/2022 11:28 AM

## 2022-04-21 NOTE — ED NOTES
"Pt with multiple attempts to elope stating \"I need to go to the lobby to make a phone call.\" ERP notified. Meds ordered and administered.   "

## 2022-04-21 NOTE — ED NOTES
Report received, assuming care.  Pt resting on his L side, eyes closed, resp even and unlabored.  1:1 observation in place, sitter right outside of the door.

## 2022-04-21 NOTE — ED NOTES
Pt sleeping in bed, no behavorial indicators of pain or distress, 1:1 sitter in direct view of pt.

## 2022-04-22 VITALS
WEIGHT: 201.72 LBS | RESPIRATION RATE: 20 BRPM | BODY MASS INDEX: 24.56 KG/M2 | SYSTOLIC BLOOD PRESSURE: 110 MMHG | OXYGEN SATURATION: 96 % | HEIGHT: 76 IN | DIASTOLIC BLOOD PRESSURE: 78 MMHG | TEMPERATURE: 98 F | HEART RATE: 120 BPM

## 2022-04-22 PROCEDURE — A9270 NON-COVERED ITEM OR SERVICE: HCPCS | Performed by: EMERGENCY MEDICINE

## 2022-04-22 PROCEDURE — 700102 HCHG RX REV CODE 250 W/ 637 OVERRIDE(OP): Performed by: EMERGENCY MEDICINE

## 2022-04-22 RX ORDER — CLONAZEPAM 0.5 MG/1
0.5 TABLET ORAL ONCE
Status: COMPLETED | OUTPATIENT
Start: 2022-04-22 | End: 2022-04-22

## 2022-04-22 RX ADMIN — NICOTINE 14 MG: 14 PATCH TRANSDERMAL at 15:12

## 2022-04-22 RX ADMIN — CLONAZEPAM 0.5 MG: 0.5 TABLET ORAL at 16:56

## 2022-04-22 NOTE — ED NOTES
Report received from RN. Assumed care of pt. 1:1 sitter in place. Pt sleeping on gurney, visible chest rise and fall.

## 2022-04-22 NOTE — ED NOTES
Pt resting at this time, NAD noted, even chest rise and fall. 1:1 observer in direct line of sight of pt.

## 2022-04-22 NOTE — DISCHARGE PLANNING
Received order granting petition for court ordered involuntary admission from the court committing pt to any accepting mental health facility for 6 months(10/21/22). Scanned copy into pt's chart.

## 2022-04-22 NOTE — ED PROVIDER NOTES
"Patient:Donald Masters  Patient : 1995  Patient MRN: [id number]  Patient PCP: Pcp Pt States None    Admit Date: 2022  Discharge Date and Time: 22 4:48 PM  Discharge Diagnosis:  1. Acute psychosis (HCC)     2. Disorganized schizophrenia (HCC)     3. Noncompliance       Discharge Attending: Raleigh Macias II, M.D.  Discharge Service: ED Observation    ED Course  Donald is a 27 y.o. male who was evaluated at [facility] for acute psychosis on 2022.  He was medically cleared here in our ER.  Urine drug screen positive for cannabis.  Blood counts metabolic panel within acceptable limits.  He was evaluated by our alert team.  Hold was certified.  He was subsequently seen by our psychiatrists while awaiting transfer for inpatient psych treatment.  He was treated with antipsychotics and benzodiazepines for his acute symptoms while here in the emergency department.  Eventually was able to be transferred to [facility].    Discharge Exam:  /78   Pulse (!) 120 Comment: ERP aware, pt anxious, -122 medicated  Temp 36.7 °C (98 °F) (Temporal)   Resp 20   Ht 1.93 m (6' 4\")   Wt 91.5 kg (201 lb 11.5 oz)   SpO2 96%   BMI 24.55 kg/m² .    Constitutional: Awake and alert.  Standing in room, pacing.  HENT:  Grossly normal  Eyes: Grossly normal  Cardiovascular: Heart rate intermittently elevated.  Will become elevated when he is agitated but returns to normal when he is calm and resting.  Thorax & Lungs: No respiratory distress  Skin:  No pathologic rash.   Extremities: Well perfused  Psychiatric: Anxious    Labs  Results for orders placed or performed during the hospital encounter of 22   Urine Drug Screen   Result Value Ref Range    Amphetamines Urine Negative Negative    Barbiturates Negative Negative    Benzodiazepines Negative Negative    Cocaine Metabolite Negative Negative    Methadone Negative Negative    Opiates Negative Negative    Oxycodone Negative " Negative    Phencyclidine -Pcp Negative Negative    Propoxyphene Negative Negative    Cannabinoid Metab Positive (A) Negative   Blood Alcohol   Result Value Ref Range    Diagnostic Alcohol <10.1 0.0 - 10.0 mg/dL   CBC WITH DIFFERENTIAL   Result Value Ref Range    WBC 8.5 4.8 - 10.8 K/uL    RBC 4.61 (L) 4.70 - 6.10 M/uL    Hemoglobin 14.2 14.0 - 18.0 g/dL    Hematocrit 43.6 42.0 - 52.0 %    MCV 94.6 81.4 - 97.8 fL    MCH 30.8 27.0 - 33.0 pg    MCHC 32.6 (L) 33.7 - 35.3 g/dL    RDW 50.5 (H) 35.9 - 50.0 fL    Platelet Count 313 164 - 446 K/uL    MPV 10.2 9.0 - 12.9 fL    Neutrophils-Polys 55.50 44.00 - 72.00 %    Lymphocytes 30.70 22.00 - 41.00 %    Monocytes 6.70 0.00 - 13.40 %    Eosinophils 6.00 0.00 - 6.90 %    Basophils 0.90 0.00 - 1.80 %    Immature Granulocytes 0.20 0.00 - 0.90 %    Nucleated RBC 0.00 /100 WBC    Neutrophils (Absolute) 4.70 1.82 - 7.42 K/uL    Lymphs (Absolute) 2.61 1.00 - 4.80 K/uL    Monos (Absolute) 0.57 0.00 - 0.85 K/uL    Eos (Absolute) 0.51 0.00 - 0.51 K/uL    Baso (Absolute) 0.08 0.00 - 0.12 K/uL    Immature Granulocytes (abs) 0.02 0.00 - 0.11 K/uL    NRBC (Absolute) 0.00 K/uL   COMP METABOLIC PANEL   Result Value Ref Range    Sodium 141 135 - 145 mmol/L    Potassium 4.0 3.6 - 5.5 mmol/L    Chloride 106 96 - 112 mmol/L    Co2 24 20 - 33 mmol/L    Anion Gap 11.0 7.0 - 16.0    Glucose 106 (H) 65 - 99 mg/dL    Bun 13 8 - 22 mg/dL    Creatinine 0.65 0.50 - 1.40 mg/dL    Calcium 9.0 8.5 - 10.5 mg/dL    AST(SGOT) 26 12 - 45 U/L    ALT(SGPT) 18 2 - 50 U/L    Alkaline Phosphatase 75 30 - 99 U/L    Total Bilirubin 0.2 0.1 - 1.5 mg/dL    Albumin 4.0 3.2 - 4.9 g/dL    Total Protein 6.5 6.0 - 8.2 g/dL    Globulin 2.5 1.9 - 3.5 g/dL    A-G Ratio 1.6 g/dL   Acetaminophen Level   Result Value Ref Range    Acetaminophen -Tylenol <5.0 (L) 10.0 - 30.0 ug/mL   SALICYLATE LEVEL   Result Value Ref Range    Salicylates, Quant. <1.0 (L) 15.0 - 25.0 mg/dL   ESTIMATED GFR   Result Value Ref Range    GFR (CKD-EPI)  132 >60 mL/min/1.73 m 2   POC BREATHALIZER   Result Value Ref Range    POC Breathalizer 0.000 0.00 - 0.01 Percent       Radiology  No orders to display       Disposition: Transfer to [facility]    Follow up: No follow-ups on file.    Medications:   There are no discharge medications for this patient.      Discharge Condition: Stable    Electronically signed by: Raleigh Macias II, M.D., 4/22/2022 4:48 PM

## 2022-04-22 NOTE — PROGRESS NOTES
"ED Provider Progress Note    ED Observation Progress Note    Date of Service: 04/22/22    Interval History  there is not been any events. The patient's vital signs have remained stable. He has no complaints. He says he is feeling a little bit better.    Physical Exam  /50   Pulse 79   Temp 36.7 °C (98 °F) (Temporal)   Resp 18   Ht 1.93 m (6' 4\")   Wt 91.5 kg (201 lb 11.5 oz)   SpO2 100%   BMI 24.55 kg/m² .    Constitutional: Awake and alert. Nontoxic  HENT:  Grossly normal  Eyes: Grossly normal      Labs  Results for orders placed or performed during the hospital encounter of 04/16/22   Urine Drug Screen   Result Value Ref Range    Amphetamines Urine Negative Negative    Barbiturates Negative Negative    Benzodiazepines Negative Negative    Cocaine Metabolite Negative Negative    Methadone Negative Negative    Opiates Negative Negative    Oxycodone Negative Negative    Phencyclidine -Pcp Negative Negative    Propoxyphene Negative Negative    Cannabinoid Metab Positive (A) Negative   Blood Alcohol   Result Value Ref Range    Diagnostic Alcohol <10.1 0.0 - 10.0 mg/dL   CBC WITH DIFFERENTIAL   Result Value Ref Range    WBC 8.5 4.8 - 10.8 K/uL    RBC 4.61 (L) 4.70 - 6.10 M/uL    Hemoglobin 14.2 14.0 - 18.0 g/dL    Hematocrit 43.6 42.0 - 52.0 %    MCV 94.6 81.4 - 97.8 fL    MCH 30.8 27.0 - 33.0 pg    MCHC 32.6 (L) 33.7 - 35.3 g/dL    RDW 50.5 (H) 35.9 - 50.0 fL    Platelet Count 313 164 - 446 K/uL    MPV 10.2 9.0 - 12.9 fL    Neutrophils-Polys 55.50 44.00 - 72.00 %    Lymphocytes 30.70 22.00 - 41.00 %    Monocytes 6.70 0.00 - 13.40 %    Eosinophils 6.00 0.00 - 6.90 %    Basophils 0.90 0.00 - 1.80 %    Immature Granulocytes 0.20 0.00 - 0.90 %    Nucleated RBC 0.00 /100 WBC    Neutrophils (Absolute) 4.70 1.82 - 7.42 K/uL    Lymphs (Absolute) 2.61 1.00 - 4.80 K/uL    Monos (Absolute) 0.57 0.00 - 0.85 K/uL    Eos (Absolute) 0.51 0.00 - 0.51 K/uL    Baso (Absolute) 0.08 0.00 - 0.12 K/uL    Immature Granulocytes " (abs) 0.02 0.00 - 0.11 K/uL    NRBC (Absolute) 0.00 K/uL   COMP METABOLIC PANEL   Result Value Ref Range    Sodium 141 135 - 145 mmol/L    Potassium 4.0 3.6 - 5.5 mmol/L    Chloride 106 96 - 112 mmol/L    Co2 24 20 - 33 mmol/L    Anion Gap 11.0 7.0 - 16.0    Glucose 106 (H) 65 - 99 mg/dL    Bun 13 8 - 22 mg/dL    Creatinine 0.65 0.50 - 1.40 mg/dL    Calcium 9.0 8.5 - 10.5 mg/dL    AST(SGOT) 26 12 - 45 U/L    ALT(SGPT) 18 2 - 50 U/L    Alkaline Phosphatase 75 30 - 99 U/L    Total Bilirubin 0.2 0.1 - 1.5 mg/dL    Albumin 4.0 3.2 - 4.9 g/dL    Total Protein 6.5 6.0 - 8.2 g/dL    Globulin 2.5 1.9 - 3.5 g/dL    A-G Ratio 1.6 g/dL   Acetaminophen Level   Result Value Ref Range    Acetaminophen -Tylenol <5.0 (L) 10.0 - 30.0 ug/mL   SALICYLATE LEVEL   Result Value Ref Range    Salicylates, Quant. <1.0 (L) 15.0 - 25.0 mg/dL   ESTIMATED GFR   Result Value Ref Range    GFR (CKD-EPI) 132 >60 mL/min/1.73 m 2   POC BREATHALIZER   Result Value Ref Range    POC Breathalizer 0.000 0.00 - 0.01 Percent       Problem List  1. Psychosis-continue with plan for psychiatry      Electronically signed by: Donnie Fitzgerald M.D., 4/22/2022 11:42 AM

## 2022-04-22 NOTE — CONSULTS
"  [facility] PSYCHIATRIC FOLLOW-UP:(established)  *Reason for admission:    Per caregiver \"even if he gets his meds he will throw them away and go take meth and drink hand . He can't take care of himself. I have him ready to go to a 90 day program at [facility], he needs eval by ER psych to determine unable to care for himself.\" Patient is agreeable with this plan in triage     *Legal Hold Status: court committed to psych facility             S:  Denies AH,SI/HI, doesn't know why he is here. Denies depression, anxiety. Doesn't know why he was drinking hand , uses meth 1 every 3 weeks, would like dc to shelter. He is vague, indifferent. Sleeping per notes, refuses meds.    O: Medical ROS (as pertinent):                      *Psychiatric Examination:   Vitals:   Vitals:    04/21/22 0100 04/21/22 0650 04/21/22 1858 04/22/22 0545   BP: (!) 99/58 120/65 118/58 104/68   Pulse: 94 85 71 85   Resp: 15 16 16 16   Temp: 36.6 °C (97.9 °F) 37.2 °C (98.9 °F) 36.2 °C (97.2 °F) 36.7 °C (98.1 °F)   TempSrc:  Temporal Temporal Temporal   SpO2: 98% 99% 100% 98%   Weight:       Height:         General Appearance: poor eye contact, unable to cooperate with any meaningful information  Musculoskeletal: wnl  Alert/Orientation: grossly intact  Attn/Concentration:intact  Fund of Knowledge:not tested  Memory recent/remote: grossly intact but not tested  Speech:minimal  Language:  fluid  Thought Content: likely psychotic, denies this and SI/HI   Thought Process: linear   Insight/Judgement: impaired  Mood: good per pt  Affect: blunted    Current Medications:  Scheduled Medications   Medication Dose Frequency   • diphenhydrAMINE  50 mg Once   • nicotine  1 Patch Q24HR   • ARIPiprazole  15 mg DAILY        *ASSESSMENT/RECOMENDATIONS:  1.Schizophrenia: chronic undifferentiated type  2  Methamphetamine use disorder  3  Neurocognitive disorder from past TBI   -  Medical:   -none acutely     Legal hold: court " committed  Observation Status  -telemonitor     Medication and Other Recommendations: final orders as per Tx Tm  1. No changes    Will continue to follow with you.           Discharge recommendations: inpt as ordered by the court

## 2022-04-22 NOTE — ED NOTES
Pt sleeping at this time, NAD noted, even chest rise and fall. 1:1 observer in direct line of sight of pt.

## 2022-04-22 NOTE — ED NOTES
Pt came out of room and tried use the hand , easily redirected back to room. Pt requesting to leave, discussed situation with patient.

## 2022-04-22 NOTE — DISCHARGE PLANNING
Alert Team/Behavioral Health Note:    Called [facility] and talked to Gunjan. Referral is pending nurse review. Intake nurse will call back after review.

## 2022-04-22 NOTE — ED NOTES
Pt refusing abilify at this time, states he has a reaction and does not want to take it at this time, mar reviewed and pt has taken this medication. Reinforced to pt the need to take medication at this time, pt still declining

## 2022-04-22 NOTE — ED NOTES
Pt at door asking for prescriptions so that he can be discharged. Redirected and discussed POC. Pt returned to [facility].

## 2022-04-22 NOTE — DISCHARGE PLANNING
Legal Hold Transfer     Referral: Legal hold transfer to Mental Health Facility     Intervention: Notified by  Lexie that pt has been accepted to [facility].     Pt's accepting physcian is Dr. Melania Martinez to [facility] at [facility]     Transport arranged through [facility]     The pt will be picked up at 1700      Notified Bedside RN Delisa and Alert Team RN Kelsey of the departure time as well as accepting facility.      Transfer packet created with original legal hold and placed on chart.      Plan: Pt will be transferring to [facility] today at 1700 via [facility].

## 2022-04-22 NOTE — DISCHARGE PLANNING
Alert Team/Behavioral Health Note:    Patient has been accepted by [facility]. Accepting physician is MD Melania. Requested [facility] pick-up is at/after 1700.

## 2022-04-23 NOTE — DISCHARGE SUMMARY
Returned phone call to mother. Informed her of transfer to [facility]. Mother states pt is allergic to haldol deconate and requests that he not receive this medication.

## 2022-04-23 NOTE — ED NOTES
Report given to [facility]. Paperwork provided. Pt taken to [facility] with all belongings.

## 2022-08-23 ENCOUNTER — HOSPITAL ENCOUNTER (EMERGENCY)
Facility: MEDICAL CENTER | Age: 27
End: 2022-08-27
Attending: EMERGENCY MEDICINE
Payer: MEDICAID

## 2022-08-23 DIAGNOSIS — F29 PSYCHOSIS, UNSPECIFIED PSYCHOSIS TYPE (HCC): ICD-10-CM

## 2022-08-23 PROCEDURE — 99285 EMERGENCY DEPT VISIT HI MDM: CPT

## 2022-08-23 PROCEDURE — 302970 POC BREATHALIZER: Performed by: EMERGENCY MEDICINE

## 2022-08-23 PROCEDURE — 90791 PSYCH DIAGNOSTIC EVALUATION: CPT

## 2022-08-23 PROCEDURE — 80307 DRUG TEST PRSMV CHEM ANLYZR: CPT

## 2022-08-23 PROCEDURE — 700102 HCHG RX REV CODE 250 W/ 637 OVERRIDE(OP): Performed by: EMERGENCY MEDICINE

## 2022-08-23 PROCEDURE — A9270 NON-COVERED ITEM OR SERVICE: HCPCS | Performed by: EMERGENCY MEDICINE

## 2022-08-23 RX ORDER — ARIPIPRAZOLE 10 MG/1
20 TABLET ORAL DAILY
Status: DISCONTINUED | OUTPATIENT
Start: 2022-08-23 | End: 2022-08-27 | Stop reason: HOSPADM

## 2022-08-23 RX ORDER — DIVALPROEX SODIUM 500 MG/1
500 TABLET, DELAYED RELEASE ORAL EVERY 8 HOURS
Status: DISCONTINUED | OUTPATIENT
Start: 2022-08-23 | End: 2022-08-27 | Stop reason: HOSPADM

## 2022-08-23 RX ADMIN — DIVALPROEX SODIUM 500 MG: 500 TABLET, DELAYED RELEASE ORAL at 16:35

## 2022-08-23 RX ADMIN — DIVALPROEX SODIUM 500 MG: 500 TABLET, DELAYED RELEASE ORAL at 22:28

## 2022-08-23 RX ADMIN — ARIPIPRAZOLE 20 MG: 10 TABLET ORAL at 16:36

## 2022-08-23 ASSESSMENT — ENCOUNTER SYMPTOMS
VOMITING: 0
FEVER: 0
SHORTNESS OF BREATH: 0
DEPRESSION: 1
ABDOMINAL PAIN: 0
NAUSEA: 0
NERVOUS/ANXIOUS: 1

## 2022-08-23 ASSESSMENT — FIBROSIS 4 INDEX: FIB4 SCORE: 0.53
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
## 2022-08-23 NOTE — ED NOTES
Pt agreed to remove clothes, gown placed onto pt. Room stripped, all personal belongings removed from bedside. Security called to place pt belongings into secured locker.     Sitter at bedside 1:1.

## 2022-08-23 NOTE — ED NOTES
"Pt placed on legal hold, at this time pt was found to be putting clothes back on. This RN told pt that he needs to remove personal clothes and place gown on. Pt not following commands and keeps telling this RN that he needs \"to leave to go see Medicare.\" Also states \"I need to catch a flight.\"     Pt sat back in stretcher but unwilling to remove clothing. Security called to bedside.   "

## 2022-08-23 NOTE — ED PROVIDER NOTES
ED Provider Note    Scribed for Elissa Fan M.D. by Nikhil Renteria. 8/23/2022, 3:11 PM.    Primary care provider: Pcp Pt States None  Means of arrival: EMS  History obtained from: Patient  History limited by: None     CHIEF COMPLAINT  Chief Complaint   Patient presents with    Psych Eval       HPI  Donald Masters is a 27 y.o. male who presents to the Emergency Department via EMS for a psychological evaluation, onset prior to arrival. He states that he was walking on the freeway today when he was pulled over by police and requested a mental health evaluation rather then going to longterm. He has a history of depression and anxiety. He reports that he is currently supposed to be taking venlafaxine  and klonopin. However, he has not been taking them recently as he lost them. He reports lives in [place], [place] and is currently visiting family in [place]. He was planning to return to [place]. He admits to drinking alcohol and using marijuana, but denies any other recreational drug use. He denies any fevers or suicidal ideations. There are no known alleviating or exacerbating factors.     Obtained and reviewed past medical records which indicated patient has significant psychiatric history.  Has been seen multiple times in [place] for psychosis.  The patient was last seen at [facility] in July, 2022. He has presented to the ED multiple times and been transferred to a behavioral health unit. He has a history of schizophrenia and paranoid delusions.     REVIEW OF SYSTEMS  Review of Systems   Constitutional:  Negative for fever.   Respiratory:  Negative for shortness of breath.    Cardiovascular:  Negative for chest pain.   Gastrointestinal:  Negative for abdominal pain, nausea and vomiting.   Psychiatric/Behavioral:  Positive for depression. Negative for suicidal ideas. The patient is nervous/anxious.    All other systems reviewed and are negative.      PAST MEDICAL HISTORY   has a past medical history of Psychiatric  disorder and TBI (traumatic brain injury) (HCC).    SURGICAL HISTORY  patient denies any surgical history    SOCIAL HISTORY  Social History     Tobacco Use    Smoking status: Every Day    Smokeless tobacco: Never   Vaping Use    Vaping Use: Unknown   Substance Use Topics    Alcohol use: Yes    Drug use: Yes     Comment: THC      Social History     Substance and Sexual Activity   Drug Use Yes    Comment: THC       FAMILY HISTORY  History reviewed. No pertinent family history.    CURRENT MEDICATIONS  Home Medications       Reviewed by Marlyn Garcias R.N. (Registered Nurse) on 08/23/22 at 1508  Med List Status: Partial     Medication Last Dose Status        Patient Darren Taking any Medications                           ALLERGIES  Allergies   Allergen Reactions    Beef-Derived Products     Doxycycline     Haldol Decanoate     Pork Derived Products     Penicillins        PHYSICAL EXAM  VITAL SIGNS: /74   Pulse (!) 107   Temp 36.7 °C (98 °F) (Temporal)   Resp 18   Wt 90.7 kg (200 lb)   SpO2 97%   BMI 24.34 kg/m²   Vitals reviewed by myself.  Nursing note and vitals reviewed.  Constitutional: Well-developed and well-nourished. No acute distress.   HENT: Head is normocephalic and atraumatic.  Eyes: extra-ocular movements intact  Cardiovascular: Tachycardic rate and regular rhythm. No murmur heard.  Pulmonary/Chest: Breath sounds normal. No wheezes or rales.   Abdominal: Soft and non-tender. No distention.    Musculoskeletal: Extremities exhibit normal range of motion without edema or tenderness.   Neurological: Awake and alert  Skin: Skin is warm and dry. No rash.   Psychiatric: Flat Affect, denies suicidal ideation        DIAGNOSTIC STUDIES /  LABS  Labs Reviewed   URINE DRUG SCREEN - Abnormal; Notable for the following components:       Result Value    Cannabinoid Metab Positive (*)     All other components within normal limits   POC BREATHALIZER - Normal       All labs reviewed by  me.      REASSESSMENT    3:19 PM - Patient seen and examined at bedside. Discussed plan of care, including labs. Patient agrees to the plan of care.     4:23 PM Patient was evaluated by life skills who would like him to be placed on a legal hold. They reviewed his records which indicate he was on abilify and divalproex, he will be restarted on these medications.    COURSE & MEDICAL DECISION MAKING  Nursing notes, VS, PMSFHx reviewed in chart.    Patient is a 27-year-old male who comes in for evaluation of mental evaluation.  Differential diagnosis includes acute psychosis, medication noncompliance, inability to care for self.  Diagnostic work-up includes urine drug screen, breathalyzer and behavioral health consultation.      Patient's initial vitals are notable for slight tachycardia.  He is otherwise well-appearing with no acute medical complaints.  Breathalyzer is negative.  Behavioral health team evaluated patient and felt that he was unable to care for himself, has a significant underlying psychiatric history and has not been taking medications.  He was previously court ordered to take medications.  Therefore they determined that legal hold was appropriate and patient was placed on legal hold.  He is pending psychiatric evaluation.  He was started on home medications per [facility] he is on Depakote 500mg 3 times daily and Abilify 20 mg daily, these are restarted in the emergency department.    FINAL IMPRESSION  1. Psychosis, unspecified psychosis type (HCC)          Nikhil BERNABE (Scribyakov)ami scribing for, and in the presence of, Elissa Fan M.D..    Electronically signed by: Nikhil Renteria (Osman), 8/23/2022    Elissa BERNABE M.D. personally performed the services described in this documentation, as scribed by Nikhil Renteria in my presence, and it is both accurate and complete.    The note accurately reflects work and decisions made by me.  Elissa Fan M.D.  8/23/2022  8:44 PM       oral

## 2022-08-24 PROCEDURE — A9270 NON-COVERED ITEM OR SERVICE: HCPCS | Performed by: EMERGENCY MEDICINE

## 2022-08-24 PROCEDURE — 700102 HCHG RX REV CODE 250 W/ 637 OVERRIDE(OP): Performed by: EMERGENCY MEDICINE

## 2022-08-24 RX ADMIN — DIVALPROEX SODIUM 500 MG: 500 TABLET, DELAYED RELEASE ORAL at 16:23

## 2022-08-24 RX ADMIN — DIVALPROEX SODIUM 500 MG: 500 TABLET, DELAYED RELEASE ORAL at 05:35

## 2022-08-24 RX ADMIN — ARIPIPRAZOLE 20 MG: 10 TABLET ORAL at 05:35

## 2022-08-24 RX ADMIN — DIVALPROEX SODIUM 500 MG: 500 TABLET, DELAYED RELEASE ORAL at 21:44

## 2022-08-24 ASSESSMENT — FIBROSIS 4 INDEX: FIB4 SCORE: 0.53

## 2022-08-24 NOTE — ED NOTES
Handoff report received from Marlyn CLEANING.    Patient sleeping. No acute distress. Active chest rise noted with non-labored breathing. No agitation noted. No behavioral pain indicators. One to one sitter in direct view of patient.

## 2022-08-24 NOTE — ED NOTES
Patient sleeping in r[place]. No apparent acute distress. Breathing is non-labored with active chest rise noted. No agitation noted. No behavioral pain indicators. One to one sitter in direct view of patient.

## 2022-08-24 NOTE — DISCHARGE PLANNING
[facility] ALERT TEAM DISCHARGE PLANNING NOTE    Date:  8/23/22  Patient Name:  Donald Masters - [age] y.o. - Discharge Planning  MRN:  [id number]   YOB: 1995  ADMISSION DATE:  8/23/2022      Writer forwarded transfer packet for inpatient psychiatric care to the following community providers:  [facility]   Items  included in the transfer packet:   __x___Face Sheet   __x___Pages 1 and 2 of completed legal hold   __x___Alert Team/Psych Assessment   _____H&P   __x___UDS   __x___Blood Alcohol   __x___Vital signs   __n/a___Pregnancy Test (if applicable)   __x___Medications List   _____Covid Screen
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
## 2022-08-24 NOTE — ED NOTES
Med Rec complete per Chart Review Records. Per previous admission at [facility] on 7/22 Pt may have been on Venlafaxine 75mg BID. Unknown last doses. Pt unable to participate in interview at this time.

## 2022-08-24 NOTE — DISCHARGE PLANNING
Alert Team Note:     Contacted [facility], spoke to Rakel. Pt packet has been received and is under review.

## 2022-08-24 NOTE — ED NOTES
Pt ambulated to bathroom, steady gait. Pt remains calm and cooperative under direct obs of designee.

## 2022-08-24 NOTE — DISCHARGE PLANNING
Alert Team:    Spoke with Karson at [facility]; patient has been declined by Dr. Chacko , patient too acute for current milieu d/t aggression hx while inpt on their BHU.

## 2022-08-24 NOTE — ED PROVIDER NOTES
ED PROVIDER NOTE    Scribed for Leland Butcher M.D. by Kishor Garcia. 8/24/2022, 6:57 AM.    This is an addendum/progress note to the note on Donald Masters. For further details and full chart entry, see the previously signed ED Provider Note written by Dr. Fan (ERP).      6:57 AM - I acquired this patient's case from Dr. Dean (ERP) who will transfer care of the patient to me at this time.    This patient has a history of schizophrenia and paranoia was found walking on the freeway.  He was positive for THC.  He drinks alcohol.  He has not recently used methamphetamines although he has used them in the past.  He has been off his medication.  He is in his 16th hour of legal hold.  Overnight vital signs were stable.  He was given a dose of his Depakote and Abilify.  Plan is transfer for psychiatric hospitalization.    6:58 AM - Patient is sleeping comfortably at this time.     Psychiatry evaluated the patient and his legal hold will be maintained.    FINAL IMPRESSION   1. Psychosis, unspecified psychosis type (HCC)        Plan: Continue medical treatment and work on placement     Kishor BERNABE (Rhondaibyakov), am scribing for, and in the presence of, Leland Butcher M.D..    Electronically signed by: Kishor Garcia (Rhondaibe), 8/24/2022    Leland BERNABE M.D. personally performed the services described in this documentation, as scribed by Kishor Garcia in my presence, and it is both accurate and complete.    The note accurately reflects work and decisions made by me.  Leland Butcher M.D.  8/24/2022  7:08 AM

## 2022-08-24 NOTE — ED NOTES
Pt sleeping but easily woken up with verbal stimuli. Pt is in no apparent distress. Breathing is non-labored with equal rise and fall of chest wall. Pt calm and cooperative; no agitation noted. Pt medicated as per MAR. One to one sitter on standby and in direct view of pt.

## 2022-08-24 NOTE — ED NOTES
Patient continues to sleep. No acute distress. Active chest rise noted. No agitation noted. No behavioral pain indicators. One to one sitter in direct view of patient.

## 2022-08-24 NOTE — ED NOTES
Rounded on pt. Patient sleeping in [place]. No acute distress. Active chest rise noted and breathing is non-labored. No agitation noted. No behavioral pain indicators. One to one sitter in direct view of patient.

## 2022-08-24 NOTE — ED NOTES
Patient sleeping in no acute distress. Breathing is non-labored with active chest rise noted. No agitation noted. No behavioral pain indicators. One to one sitter in direct view of patient.

## 2022-08-24 NOTE — ED NOTES
Pt sleeping but easily woken up with verbal stimuli. Pt is in no apparent distress. Breathing is non-labored with equal rise and fall of chest wall. No agitation currently noted. One to one sitter on standby and in direct view of pt.

## 2022-08-24 NOTE — ED NOTES
Patient sleeping but easily woken up with verbal stimuli. No signs of distress noted. Noted non-labored breathing. Equal chest rise and fall. No further needs at this time. One to one sitter on standby and in direct view of pt.

## 2022-08-24 NOTE — DISCHARGE PLANNING
[facility] ALERT TEAM DISCHARGE PLANNING NOTE    Date:  8/24/22  Patient Name:  Donald Del Angel y.o. - Discharge Planning  MRN:  [id number]   YOB: 1995  ADMISSION DATE:  8/23/2022      Writer forwarded transfer packet for inpatient psychiatric care [facility] the [facility] community providers[facility]  [facility]   Items  included in the transfer packet:   __x___Face Sheet   __x___Pages 1 and 2 of completed legal hold   __x___Alert Team/Psych Assessment   __x___H&P   _____UDS   _____Blood Alcohol   __x___Vital signs   __n/a___Pregnancy Test (if applicable)   _____Medications List   _____Covid Screen

## 2022-08-24 NOTE — ED NOTES
Pt resting in room, easy, equal chest rise and fall. Pt remains calm and cooperative under direct obs of designee. Pt provided with hospital prepared meal.

## 2022-08-24 NOTE — ED NOTES
Patient was found walking along the hallway, requested to be seen for mental health services when stopped by police. Patient reports venlafaxine, klonopin, depakote, and aripiprazole as home medications, but lost supply and has not taken them for 1 month.      Patient's home medications have been reviewed by the pharmacy team.     Past Medical History:   Diagnosis Date    Psychiatric disorder     schizophrenia    TBI (traumatic brain injury) (HCC)        Patient's Medications    No medications on file         Latest Reference Range & Units 8/23/22 15:27   Cannabinoid Metab Negative  Positive !   !: Data is abnormal    A:  Medication non-adherence appears to be contributing to current complaints.       P:    No recommendations at this time. Home medications have been reordered as appropriate.      Marty Vargas, PharmD

## 2022-08-24 NOTE — ED NOTES
Patient sleeping at this time. No acute distress. Active chest rise noted with non-labored breathing. No agitation noted. No behavioral pain indicators. One to one sitter in direct view of patient.

## 2022-08-24 NOTE — ED NOTES
Pt sleeping in room, easy, equal chest rise and fall. Pt remains calm and cooperative under direct obs of designee.

## 2022-08-24 NOTE — ED NOTES
Rounded on patient. Patient sleeping. No signs of distress noted. Breathing is non-labored with equal chest rise and fall. No further needs at this time. One to one sitter at bedside.

## 2022-08-24 NOTE — ED NOTES
Patient sleeping at this time. No acute distress. Active chest rise noted. No agitation noted. No behavioral pain indicators. One to one sitter in direct view of patient.

## 2022-08-24 NOTE — ED NOTES
Pt alert and awake at this time. Noted to be calm and cooperative. Pt given dinner tray. Pt tolerating PO without any difficulty. One to one sitter in direct view of pt.

## 2022-08-25 PROCEDURE — A9270 NON-COVERED ITEM OR SERVICE: HCPCS | Performed by: EMERGENCY MEDICINE

## 2022-08-25 PROCEDURE — 700102 HCHG RX REV CODE 250 W/ 637 OVERRIDE(OP): Performed by: EMERGENCY MEDICINE

## 2022-08-25 RX ADMIN — ARIPIPRAZOLE 20 MG: 10 TABLET ORAL at 07:24

## 2022-08-25 RX ADMIN — DIVALPROEX SODIUM 500 MG: 500 TABLET, DELAYED RELEASE ORAL at 14:39

## 2022-08-25 RX ADMIN — DIVALPROEX SODIUM 500 MG: 500 TABLET, DELAYED RELEASE ORAL at 23:24

## 2022-08-25 RX ADMIN — DIVALPROEX SODIUM 500 MG: 500 TABLET, DELAYED RELEASE ORAL at 07:24

## 2022-08-25 NOTE — ED NOTES
Rounding completed, patient asleep, resting comfortably. Denies need at this time. Sitter in direct patient view for high risk.

## 2022-08-25 NOTE — ED NOTES
Patient resting on gurney. No acute distress. Active chest rise noted. No agitation noted. No behavioral pain indicators. One to one sitter in direct view of patient.

## 2022-08-25 NOTE — PROGRESS NOTES
Spiritual Care Note      Patient's Name: Donald Masters   MRN: [id number]    YOB: 1995   Age and Gender: 27 y.o. male   Unit/Service Area: Emergency   Room (and Bed): [place] [address]   Ethnicity or Nationality: uncertain   Druze/Spiritual Preference: Nothing in Particular   Place of Residence: [facility]   Medical Diagnosis: psychosis, unspecified type   Code Status: No Order    Date of Admission: 8/23/2022   Length of Stay: 0 days        Visited With:   Patient    Nature of the Visit:   Initial, On shift    General Visit:   Yes    Referral From/Origin of Request:   Verbal staff ([facility])    Druze Needs:   Scripture (Patient requesting a Bible.)    Observations/Symptoms:   Laying in bed, blankets pulled up around his head so only face showing.)    Interaction/Conversation:   Gave patient Bible.  Patient said he no other needs at this time.    Assessment:   Need    Need:   Seeking Spiritual Assistance and Support    Intended Effects:   Demonstrate Caring and Concern    Interventions:   Compassionate Presence, Giving a Bible    Outcomes:   uncertain    Plan:   Visit Upon Request    Total Time:   5 minutes      Spiritual Care Provider   Chaplain ROMEO Quiroz  [phone]   [email or web address]

## 2022-08-25 NOTE — ED PROVIDER NOTES
ED Provider Note    ED Observation Progress Note    Date of Service: 08/25/22    Interval History  10 AM patient is evaluated at the bedside.  He is resting and appears comfortable.  There have been no events under my care.  Patient has reassuring vital signs.  He has no complaints and no requests.  Review of prior records, patient has numerous prior ED visits for various concerns including schizophrenia, alcoholism, depression.  He is here awaiting transfer to a psychiatric facility.  I anticipate, he will likely be here at the close of my shift and if so, care will be transferred to oncoming ERP.    Physical Exam  /69   Pulse 61   Temp 36.7 °C (98.1 °F) (Temporal)   Resp 14   Wt 90.7 kg (200 lb)   SpO2 97%   BMI 24.34 kg/m² .    Constitutional: Awake and alert. Nontoxic  HENT:  Grossly normal  Eyes: Grossly normal  Neck: Normal range of motion  Cardiovascular: Normal heart rate   Thorax & Lungs: No respiratory distress  Abdomen: Nontender  Skin:  No pathologic rash.   Extremities: Well perfused  Psychiatric: Affect normal    Labs  Results for orders placed or performed during the hospital encounter of 08/23/22   URINE DRUG SCREEN   Result Value Ref Range    Amphetamines Urine Negative Negative    Barbiturates Negative Negative    Benzodiazepines Negative Negative    Cocaine Metabolite Negative Negative    Methadone Negative Negative    Opiates Negative Negative    Oxycodone Negative Negative    Phencyclidine -Pcp Negative Negative    Propoxyphene Negative Negative    Cannabinoid Metab Positive (A) Negative   POC BREATHALIZER   Result Value Ref Range    POC Breathalizer 0.00 0.00 - 0.01 Percent       Problem List  1. Psychosis, unspecified psychosis type (HCC)                Electronically signed by: Yelena Solis D.O., 8/25/2022 4:47 AM

## 2022-08-25 NOTE — CONSULTS
"  [facility] PSYCHIATRIC FOLLOW-UP:(established)    DOS: 08/25/22     Reason for admission:  BIBP after [place] free[place], reportedly [place] to walk to [place], [place]  Legal Hold Status: on legal hold      ID/Chief Complaint: Patient is a 27 y.o. man with chart review indicating psych hx of schizophrenia (and non adherence to medication), methamphetamine use d/o (reporting last use 2mo ago),  and medical hx s/f neurocognitive d/o, TBI. Has recent hx 4/2022 - of drinking hand  because it \"calms me down\" and OD attempt 4/6/22. Pt denies current hand  ingestion.   Chief Complaint   Patient presents with    Psych Eval                    S: Seen today as f/u psych consult. Pt more clear today, mood \"good.\" Appetite and sleep intact. Denies A/VH though continues to present with internal preoccupation (vs cognitive issues). He is unable to indentify any sort of plan for how he would access shelter or clothing, however he does say he can go to \"[address] in [place] for food. Says that he uses the [employer] to search for jobs but has been unsuccessful. States he recently lost all of his clothing, his towels, and his personal belongings including identification - does not know how he will find/replace these. Requests to call his family, refuses to let this writer speak to them. Denies SI/HI. No ASE from meds.     O: Medical ROS (as pertinent): No new changes reported to this writer.    No recent labs. Most recent utox 8/23/22, + for cannabinoids.      PSYCHIATRIC EXAM (MSE):   Vitals:    08/25/22 0636   BP: 107/62   Pulse: 76   Resp: 16   Temp:    SpO2: 96%      Weight: 90.7kg on 8/23/22    Constitutional: as noted above  General Appearance/Behavior: 27 y.o. appears stated age, normal habitus intermittent eye contact cooperative, very lanky and tall  Abnormal Movements: none, no PMA/PMR or tremor observed.  Gait and Posture: not observed  Musculoskeletal: as noted " "above  Mood: \"good\"  Affect: Flat   Speech: long pauses before answering - normal rate/tone  Language:  spontaneous, comprehends spoken commands, and fluent   Thought Process: Linear Logical  Thought Content: Denies SI/HI. Denies A/VH.   Insight/Judgement:  improving  Alert/Orientation: alert, oriented to person, place and time  Attn/Concentration: normal  Fund of Knowledge: Average  Memory recent/remote: No gross evidence of memory deficits  MMSE: deferred this visit          Meds Current:  Scheduled Medications   Medication Dose Frequency    divalproex  500 mg Q8HRS    ARIPiprazole  20 mg DAILY     Allergies:   Allergies   Allergen Reactions    Beef-Derived Products     Doxycycline     Haldol Decanoate     Pork Derived Products     Penicillins            *ASSESSMENT:   1. Psychosis, unspecified psychosis type (HCC)       Psychiatric:   Schizophrenia - currently decompensated  MJ use - Utox +              R/o cannabis use d/o  ETOH use (R/o ETOH use d/o)  Hx stimulant use d/o (Utox -)     Medical: as noted by the medical treatment team.   None acutely  Neurocognitive d/o per chart review  Hx TBI's       *RECOMMENDATIONS:  Legal Status: on legal hold    Please transfer patient to inpatient psychiatric hospital when medically cleared and bed is available.    Observation status:   -line of site with sitter    Visitors: No   Personal belongings: No     Discussed/voalted: RN    Medication Recommendations: Final orders as per Treatment Team  No med changes  Risks/benefits/side effects discussed, patient verbalized understanding  Medication reconciliation was completed.  Reviewed safety plan: 911, ER, PCM, MHC, suicide crisis line, nursing staff while inpatient.    Will Continue to Follow. Thank you for the consult.           Discharge recommendations:      -Please assist with outpatient Psychiatric/substance use follow up appointments at discharge once medically cleared.  -reportedly receives care intermittently " @[facility].  Per ALERT team, pt might benefit from connection with [facility] (work with pts with TBI and devlopmental delays)

## 2022-08-25 NOTE — ED NOTES
Mother provided update per PT request. Mother reports [facility] called and stated they would be willing to admit PT- [phone] (Meri)

## 2022-08-25 NOTE — DISCHARGE PLANNING
Alert Team Note:    Contacted [facility], spoke to Angelina. Pt is on the wait list. No beds available at this time.

## 2022-08-25 NOTE — ED NOTES
Rounding completed, patient asleep, resting comfortably. Denies need, sitter in direct patient view.

## 2022-08-25 NOTE — ED NOTES
PT is insisting that he now needs to leave the hospital, this RN emphasized that the PT is on a legal hold for his own safety. Alert team notified.

## 2022-08-25 NOTE — ED NOTES
Handoff report given to [employer] for continuity of care.    Pt transferred to Room G29 with RN and one to one sitter in stable condition.

## 2022-08-25 NOTE — ED NOTES
Report completed, assumed care for this patient. Updated on plan of care, denies need at this time. Sitter present.

## 2022-08-25 NOTE — ED NOTES
Rounded on patient. Patient resting on gurney. No acute distress. Active chest rise noted. No agitation noted. No behavioral pain indicators. No further needs at this time. One to one sitter in direct view of patient.

## 2022-08-25 NOTE — ED NOTES
Handoff report received from [employer].    Patient resting on gurney. No acute distress. Active chest rise noted. No agitation noted. No behavioral pain indicators. One to one sitter in direct view of patient.

## 2022-08-25 NOTE — DISCHARGE PLANNING
Alert Team Note:    Attempted to contact [facility], writer was unable to leave a message. Acceptance has not been confirmed at this time.

## 2022-08-26 PROCEDURE — 700102 HCHG RX REV CODE 250 W/ 637 OVERRIDE(OP): Performed by: EMERGENCY MEDICINE

## 2022-08-26 PROCEDURE — A9270 NON-COVERED ITEM OR SERVICE: HCPCS | Performed by: EMERGENCY MEDICINE

## 2022-08-26 RX ORDER — NICOTINE 21 MG/24HR
1 PATCH, TRANSDERMAL 24 HOURS TRANSDERMAL ONCE
Status: COMPLETED | OUTPATIENT
Start: 2022-08-26 | End: 2022-08-27

## 2022-08-26 RX ADMIN — DIVALPROEX SODIUM 500 MG: 500 TABLET, DELAYED RELEASE ORAL at 22:00

## 2022-08-26 RX ADMIN — ARIPIPRAZOLE 20 MG: 10 TABLET ORAL at 05:45

## 2022-08-26 RX ADMIN — DIVALPROEX SODIUM 500 MG: 500 TABLET, DELAYED RELEASE ORAL at 15:10

## 2022-08-26 RX ADMIN — DIVALPROEX SODIUM 500 MG: 500 TABLET, DELAYED RELEASE ORAL at 05:45

## 2022-08-26 NOTE — ED NOTES
Pt sleeping in bed, equal chest rise and fall, airway patent, rr even and unlabored, nad noted. 1:! Sitter present.

## 2022-08-26 NOTE — CONSULTS
"  [facility] PSYCHIATRIC FOLLOW-UP:(established)  *Reason for admission:  found walking on the freeway on his way, reportedly, to [place], [place]  *Legal Hold Status: on legal hold                S:  pt says that he wanted to find a place in [place]. He is homeless here. He admits to carrying on only a little water. He says he is on the meds that give him side effects but can't say what they are and \"that's why I don't like psychiatric medications\". He is sleeping, denies depression, anxiety psychosis, SI/HI but has no insight/judgment.     O: Medical ROS (as pertinent):                      *Psychiatric Examination:   Vitals:   Vitals:    08/25/22 2000 08/26/22 0000 08/26/22 0422 08/26/22 1153   BP: 105/65 (!) 98/66 (!) 145/80 136/80   Pulse: 85 81 64 72   Resp: 16 16 14 16   Temp: 36.8 °C (98.2 °F) 37 °C (98.6 °F) 37.1 °C (98.7 °F)    TempSrc:   Temporal    SpO2: 100% 100% 98% 97%   Weight:         General Appearance:  no eye contact, some thought delay, cooperative to the degree able.  Abnormal Movements: none   Gait and Posture: not observed  Speech: minimal  Thought Process: slow rate  Associations:   linear but not logical or thought through.   Abnormal or Psychotic Thoughts: none  Judgement and Insight: impaired   Orientation: grossly intact  Recent and Remote Memory: grossly intact  Attention Span and Concentration: intact  Language:fluent  Fund of Knowledge: not tested  Mood and Affect: constricted and blunted  SI/HI:  suicidal - no and homicidal - no       Current Medications:  Scheduled Medications   Medication Dose Frequency    divalproex  500 mg Q8HRS    ARIPiprazole  20 mg DAILY          *ASSESSMENT/RECOMENDATIONS:  1. Schizophrenia: acute on chronic  2  Hx of meth  3  Hx of alcohol use (R/O disorder)  4 THC use  5  Hx of neurocognitive disorder from TBI    Medical:   -none acutely     Legal hold: extended  Observation status:   -telemonitor    Visitors: no  Personal " belongings:no    Medication and Other Recommendations: final orders as per Tx Tm  No changes to meds  Pt needs a  or something similar as well as a long acting injectable.  to contact the outpt tx tm       Will continue to follow with you.      Discharge recommendations: inpt psych

## 2022-08-26 NOTE — ED NOTES
Report received from HERMILA Mccollum. Assumed care of patient. Patient resting calmly on gurney. 1 to 1 sitter in view of patient.

## 2022-08-26 NOTE — ED NOTES
Pt continues to stay in his room and pace back and forth around his gurney. No needs at this time.

## 2022-08-26 NOTE — PROGRESS NOTES
ED Provider Progress Note    ED Observation Progress Note    Date of Service: 08/26/22    Interval History  Patient's known to me from rounding yesterday.  On a legal hold.    9:30 AM notified by nursing staff, that the patient seemed to be getting more agitated, pacing around the room.  However, by the time I saw the patient he had returned to laying in bed.  He is resting and appears comfortable.  He has no complaints.  Nursing staff notified to alert me should condition change.  He did not require any intervention for this prior episode.    10:45 AM patient's reevaluated the bedside.  He is pacing around the room but is calm when I entered.  He states he simply just moving his legs.  He does request to be discharged at 130 today because he needs to get to an 's office.  He understands he is on a legal hold.  Await transfer or psychiatry reevaluation.  I have offered to help him get in touch with his  but he does not know any phone numbers and is reluctant to give me any other information to help me connect him with his .    Physical Exam  BP (!) 145/80   Pulse 64   Temp 37.1 °C (98.7 °F) (Temporal)   Resp 14   Wt 90.7 kg (200 lb)   SpO2 98%   BMI 24.34 kg/m² .    Constitutional: Awake and alert. Nontoxic  HENT:  Grossly normal  Eyes: Grossly normal  Neck: Normal range of motion  Cardiovascular: Normal heart rate   Thorax & Lungs: No respiratory distress  Abdomen: Nontender  Skin:  No pathologic rash.   Extremities: Well perfused  Psychiatric: Affect normal    Labs  Results for orders placed or performed during the hospital encounter of 08/23/22   URINE DRUG SCREEN   Result Value Ref Range    Amphetamines Urine Negative Negative    Barbiturates Negative Negative    Benzodiazepines Negative Negative    Cocaine Metabolite Negative Negative    Methadone Negative Negative    Opiates Negative Negative    Oxycodone Negative Negative    Phencyclidine -Pcp Negative Negative    Propoxyphene  Negative Negative    Cannabinoid Metab Positive (A) Negative   POC BREATHALIZER   Result Value Ref Range    POC Breathalizer 0.00 0.00 - 0.01 Percent     Problem List  1. Psychosis, unspecified psychosis type (HCC)                Electronically signed by: Yelena Solis D.O., 8/26/2022 9:49 AM

## 2022-08-26 NOTE — ED NOTES
Pt ambulatory to restroom without new incident or distress. Pt calm and cooperative at this time.

## 2022-08-26 NOTE — ED NOTES
Pt arousable, alert self, year and city. Pt skin warm and dry. Airway patent, rr even and unlabored, nad noted. 1:1 sitter in place.

## 2022-08-26 NOTE — DISCHARGE PLANNING
Alert Team/Behavioral Health   Note:    - [facility]: talked to Jose. No beds currently available. Patient is on waiting list.    - [facility]: Talked to Lb (RN Supervisor). No beds currently available. Will be full over weekend; no discharges. Patient is on waiting list.

## 2022-08-26 NOTE — ED NOTES
Pt medicated per MAR. Pt sleeping but arousable to take medication. Pt aox4, skin warm and dry, airway patent, rr even and unlabored, nad noted.

## 2022-08-26 NOTE — ED NOTES
Pt resting, airway patent, rr even and unlabored, equal chest rise and fall. Nad noted.   1:1 sitter at bedside.

## 2022-08-26 NOTE — ED NOTES
Pt sleeping in bed, equal chest rise and fall, airway patent, rr even and unlabored, nad noted. 1:1 sitter in place.

## 2022-08-26 NOTE — ED NOTES
Patient provided paper and marker per request. 1 to 1 sitter observing patient and notified to remove marker from room when patient is done writing.

## 2022-08-26 NOTE — ED NOTES
Pt resting, airway patent, rr even and unlabored, equal chest rise and fall. Nad noted.   1:1 sitter in place.

## 2022-08-27 VITALS
HEART RATE: 115 BPM | OXYGEN SATURATION: 98 % | TEMPERATURE: 97.5 F | SYSTOLIC BLOOD PRESSURE: 118 MMHG | WEIGHT: 200 LBS | RESPIRATION RATE: 16 BRPM | BODY MASS INDEX: 24.34 KG/M2 | DIASTOLIC BLOOD PRESSURE: 78 MMHG

## 2022-08-27 PROCEDURE — A9270 NON-COVERED ITEM OR SERVICE: HCPCS | Performed by: EMERGENCY MEDICINE

## 2022-08-27 PROCEDURE — 96372 THER/PROPH/DIAG INJ SC/IM: CPT

## 2022-08-27 PROCEDURE — 700102 HCHG RX REV CODE 250 W/ 637 OVERRIDE(OP): Performed by: EMERGENCY MEDICINE

## 2022-08-27 PROCEDURE — 700111 HCHG RX REV CODE 636 W/ 250 OVERRIDE (IP): Performed by: PSYCHIATRY & NEUROLOGY

## 2022-08-27 RX ORDER — LORAZEPAM 1 MG/1
1 TABLET ORAL ONCE
Status: COMPLETED | OUTPATIENT
Start: 2022-08-27 | End: 2022-08-27

## 2022-08-27 RX ADMIN — LORAZEPAM 1 MG: 1 TABLET ORAL at 09:37

## 2022-08-27 RX ADMIN — PALIPERIDONE PALMITATE 234 MG: 234 INJECTION INTRAMUSCULAR at 15:36

## 2022-08-27 RX ADMIN — DIVALPROEX SODIUM 500 MG: 500 TABLET, DELAYED RELEASE ORAL at 06:00

## 2022-08-27 RX ADMIN — ARIPIPRAZOLE 20 MG: 10 TABLET ORAL at 06:00

## 2022-08-27 RX ADMIN — NICOTINE 21 MG: 21 PATCH TRANSDERMAL at 06:22

## 2022-08-27 NOTE — ED NOTES
Pt awoke. Ambulated to and from restroom then passed room and attempted to elope. Security called. Pt redirected to room.

## 2022-08-27 NOTE — CONSULTS
"  [facility] PSYCHIATRIC FOLLOW-UP:(established)  *Reason for admission:   found walking on the freeway on his way, reportedly, to [place], [place]  *Legal Hold Status: on legal hold                        S:  reluctant to take meds because the voices tell him not to..... he is not SI/HI and tried to abscond earlier. Still wants to leave, asking if their is a bed at [facility].  Also explained that when he is well his thinking is better and he does not get himself in trouble as he has. Gave him the example of drinking hand . \"I don't do that anymore\" spoken softly. Similarly his trek to [place] and by walking was concerning. He smiled a little.    O: Medical ROS (as pertinent):                      *Psychiatric Examination:   Vitals:   Vitals:    08/26/22 1153 08/26/22 1800 08/27/22 0700 08/27/22 1134   BP: 136/80 130/78 124/76 116/70   Pulse: 72 70 68 60   Resp: 16 16 17 16   Temp:    37 °C (98.6 °F)   TempSrc:       SpO2: 97% 96% 96% 95%   Weight:       General Appearance: better eye contact, some thought delay, cooperative to the degree able.  Abnormal Movements: none   Gait and Posture: not observed  Speech: minimal  Thought Process: slow rate  Associations:   linear but not logical or thought through.   Abnormal or Psychotic Thoughts: none  Judgement and Insight: impaired   Orientation: grossly intact  Recent and Remote Memory: grossly intact  Attention Span and Concentration: intact  Language:fluent  Fund of Knowledge: not tested  Mood and Affect: constricted and blunted but managed a smile when talking of my past experiences with him  SI/HI:  suicidal - no and homicidal - no     Current Medications:  Scheduled Medications   Medication Dose Frequency    nicotine  1 Patch Once    divalproex  500 mg Q8HRS    ARIPiprazole  20 mg DAILY          *ASSESSMENT/RECOMENDATIONS:  1. Schizophrenia: acute on chronic  2  Hx of meth  3  Hx of alcohol use (R/O disorder)  4 THC use  5  Hx of " neurocognitive disorder from TBI     Medical:   -none acutely      Legal hold: extended but will release from it once he gets the injectable.  Observation status:   -telemonitor     Visitors: no  Personal belongings:no    Discussed/voalted: HELLEN Herrera MD     Medication and Other Recommendations: final orders as per Tx Tm  No changes to meds  Pt needs a  or something similar as well as a long acting injectable.  to contact the outpt tx tm         Will continue to follow with you.      Discharge recommendations: possible dc to [facility] or self.

## 2022-08-27 NOTE — ED NOTES
Patient requesting nicotine patch. Dr. Gamez notified of request on volt. 1 to 1 sitter in view of patient.

## 2022-08-27 NOTE — PROGRESS NOTES
ED Provider Progress Note    ED Observation Progress Note    Date of Service: 08/27/22    Interval History  9:35 AM patient seen at the bedside.  He is pacing around the room.  He was doing this yesterday and is known to me from rounding over the last few days.  However, today he made an attempt at eloping from the ED.  Unfortunately, he still here, on a legal hold.  He has been somewhat redirectable but concern for increasing agitation.  Discussed this with nursing staff.  Will be treated with a milligram of p.o. Ativan.  No further events under my care.    Review of notes.  Currently, [facility] adult mental health services have no available beds.  Patient is on a waiting list at both facilities.  Patient was seen as recently as yesterday by Dr. Beatty.  I anticipate he will still be on a legal hold at the close of my shift.  For this reason, care will be transferred to [facility].    Physical Exam  /78   Pulse 70   Temp 37.1 °C (98.7 °F) (Temporal)   Resp 16   Wt 90.7 kg (200 lb)   SpO2 96%   BMI 24.34 kg/m² .    Constitutional: Awake and alert. Nontoxic  HENT:  Grossly normal  Eyes: Grossly normal  Neck: Normal range of motion  Cardiovascular: Normal heart rate   Thorax & Lungs: No respiratory distress  Abdomen: Nontender  Skin:  No pathologic rash.   Extremities: Well perfused  Psychiatric: Affect normal    Labs  Results for orders placed or performed during the hospital encounter of 08/23/22   URINE DRUG SCREEN   Result Value Ref Range    Amphetamines Urine Negative Negative    Barbiturates Negative Negative    Benzodiazepines Negative Negative    Cocaine Metabolite Negative Negative    Methadone Negative Negative    Opiates Negative Negative    Oxycodone Negative Negative    Phencyclidine -Pcp Negative Negative    Propoxyphene Negative Negative    Cannabinoid Metab Positive (A) Negative   POC BREATHALIZER   Result Value Ref Range    POC Breathalizer 0.00 0.00 - 0.01 Percent      Problem List  1. Psychosis, unspecified psychosis type (HCC)                Electronically signed by: Yelena Solis D.O., 8/27/2022 4:04 AM

## 2022-08-27 NOTE — ED NOTES
Pt continually pacing around room after elopement attempt. ERP aware. Order received. Pt medicated per MAR. Being cooperative at this time.

## 2022-08-27 NOTE — ED NOTES
Patient resting on gurney. No acute distress. Active chest rise noted. No agitation noted. No behavioral pain indicators. 1 to 1 sitter in direct view of patient.

## 2022-08-27 NOTE — ED NOTES
Pt resting in room equal chest rise and fall seen. No distress at this time. 1:1 sitter in front of room

## 2022-08-27 NOTE — DISCHARGE PLANNING
Alert Team/Behavioral Health   Note:     - [facility]: talked to Leatha in intake. No beds currently available. Patient is on waiting list.     - [facility]: No beds currently available. Patient is on waiting list.

## 2022-08-28 NOTE — DISCHARGE SUMMARY
ED Observation Discharge Summary  Patient:Donald Masters  Patient : 1995  Patient MRN: [id number]  Patient PCP: Pcp Pt States None    Admit Date: 2022  Discharge Date and Time: 22 7:22 PM  Discharge Diagnosis: acute psychosis  Discharge Attending: Silver Herrera M.D.  Discharge Service: ED Observation    ED Course    12:00 PM - I discussed the patient's case with Dr. Solis ([facility]) who will transfer care of the patient to me at this time. Patient is awaiting psychiatric stabilization    3:01 PM  - Patient attempted to flee to lobby as he no longer feels that he has suicidal or homicidal or an acute psychiatric crisis.       Donald is a 27 y.o. male who was evaluated at [facility] who agreed to get Invega shot and agrees to follow-up with psychiatry as outpatient and receive outpatient injections on an ongoing basis.  At this time patient denies any ongoing psychosis and agrees to follow-up to take outpatient injections.  Patient agrees to dial 911 or return to emergency department if symptoms worsen or progress.    Discharge Exam:  /86   Pulse (!) 116   Temp 37 °C (98.6 °F)   Resp 16   Wt 90.7 kg (200 lb)   SpO2 100%   BMI 24.34 kg/m² .    Constitutional: Awake and alert. Nontoxic  HENT:  Grossly normal  Eyes: Grossly normal  Neck: Normal range of motion  Cardiovascular: Normal heart rate   Thorax & Lungs: No respiratory distress  Abdomen: Nontender  Skin:  No pathologic rash.   Extremities: Well perfused  Psychiatric: No ongoing SI or HI or desire to harm others or property of others.     Labs  Results for orders placed or performed during the hospital encounter of 22   URINE DRUG SCREEN   Result Value Ref Range    Amphetamines Urine Negative Negative    Barbiturates Negative Negative    Benzodiazepines Negative Negative    Cocaine Metabolite Negative Negative    Methadone Negative Negative    Opiates Negative Negative    Oxycodone Negative Negative    Phencyclidine -Pcp Negative  Negative    Propoxyphene Negative Negative    Cannabinoid Metab Positive (A) Negative   POC BREATHALIZER   Result Value Ref Range    POC Breathalizer 0.00 0.00 - 0.01 Percent       Radiology  No orders to display         Medications:   New Prescriptions    No medications on file       Discharge Condition: Stable    Electronically signed by: Silver Herrera M.D., 8/27/2022 7:22 PM

## 2022-08-28 NOTE — DISCHARGE PLANNING
Face sheet and MAR documenting pt's first Invega Sustenna injection sent  to [facility]. Pt will need second Invega injection 9/2/22. Per Dr. Dowling, pt may discharge after receiving his injection. Pt verbalized understanding of plan and agrees to follow up with with [facility] next week.

## 2022-08-28 NOTE — ED NOTES
MTM transport arranged by Alert Team.  Patient educated on discharge instructions, follow up appointments, prescriptions, and home care. Patient verbalized understanding. Patient ambulated well to ER lobby.

## 2022-10-06 NOTE — NUR
Patient resting with eyes closed, bilateral chest rise and fall, no apparent 
distress.  Continue to monitor patients condition with sitter at bedside. No restrictions

## 2023-02-07 NOTE — ED NOTES
Pt comfortable on gurney, resting, sitter at bedside, no updates or changes   Quality 226: Preventive Care And Screening: Tobacco Use: Screening And Cessation Intervention: Patient screened for tobacco use and is an ex/non-smoker Detail Level: Detailed Quality 130: Documentation Of Current Medications In The Medical Record: Current Medications Documented Quality 431: Preventive Care And Screening: Unhealthy Alcohol Use - Screening: Patient not identified as an unhealthy alcohol user when screened for unhealthy alcohol use using a systematic screening method Quality 110: Preventive Care And Screening: Influenza Immunization: Influenza immunization was not ordered or administered, reason not given

## 2023-05-12 NOTE — ED NOTES
Recommendations    1.Continue Renal diet      2. If PO intake <50%, add Novasource renal TID      3. RD to monitor and follow    Goals: Meet % EEN, EPN by RD f/u  Nutrition Goal Status: new  Communication of RD Recs:  (POC)   Rounding completed, patient asleep, resting comfortably. Denies need, sitter in view.

## 2023-08-17 NOTE — NUR
FATHER TARIQ CALLED AND SAID THAT HE HAS LEFT MESSAGES AT [facility] 
[facility] AND AWAITING CALL BACK TO SEE OF A CLERGYMAN MAY COME IN TO SEE 
PATIENT. Hide Additional Notes?: No Include Location In Plan?: Yes Detail Level: Zone

## 2023-10-13 NOTE — ED NOTES
Pt medicated per mar.    You can access the FollowMyHealth Patient Portal offered by [facility] by registering at the following website: [email or web address]. By joining [employer]’s FollowMyHealth portal, you will also be able to view your health information using other applications (apps) compatible with our system.

## 2024-12-06 NOTE — NUR
PATIENT APPARENTLY VEGETARIAN, GOT HIM A VEGETARIAN MEAL TRAY. CALMER, IN BED, 
COOPERATING AT THIS TIME [Follow-up] : a follow-up of an existing diagnosis